# Patient Record
Sex: FEMALE | URBAN - METROPOLITAN AREA
[De-identification: names, ages, dates, MRNs, and addresses within clinical notes are randomized per-mention and may not be internally consistent; named-entity substitution may affect disease eponyms.]

---

## 2019-08-12 ENCOUNTER — WALK IN (OUTPATIENT)
Dept: URGENT CARE | Age: 64
End: 2019-08-12

## 2019-08-12 VITALS
HEART RATE: 84 BPM | WEIGHT: 120 LBS | HEIGHT: 66 IN | SYSTOLIC BLOOD PRESSURE: 118 MMHG | OXYGEN SATURATION: 100 % | BODY MASS INDEX: 19.29 KG/M2 | RESPIRATION RATE: 18 BRPM | DIASTOLIC BLOOD PRESSURE: 68 MMHG | TEMPERATURE: 97.9 F

## 2019-08-12 DIAGNOSIS — J01.40 ACUTE NON-RECURRENT PANSINUSITIS: Primary | ICD-10-CM

## 2019-08-12 PROCEDURE — 99203 OFFICE O/P NEW LOW 30 MIN: CPT | Performed by: NURSE PRACTITIONER

## 2019-08-12 RX ORDER — BENZONATATE 100 MG/1
100 CAPSULE ORAL 3 TIMES DAILY PRN
Qty: 20 CAPSULE | Refills: 0 | Status: SHIPPED | OUTPATIENT
Start: 2019-08-12 | End: 2021-11-19 | Stop reason: CLARIF

## 2019-08-12 RX ORDER — SACCHAROMYCES BOULARDII 250 MG
500 CAPSULE ORAL 2 TIMES DAILY
Qty: 48 CAPSULE | Refills: 1 | Status: SHIPPED | OUTPATIENT
Start: 2019-08-12

## 2019-08-12 RX ORDER — DOXYCYCLINE HYCLATE 100 MG/1
100 CAPSULE ORAL 2 TIMES DAILY
Qty: 20 CAPSULE | Refills: 0 | Status: SHIPPED | OUTPATIENT
Start: 2019-08-12 | End: 2019-08-22

## 2019-08-12 SDOH — HEALTH STABILITY: MENTAL HEALTH: HOW OFTEN DO YOU HAVE 6 OR MORE DRINKS ON ONE OCCASION?: LESS THAN MONTHLY

## 2019-08-12 ASSESSMENT — ENCOUNTER SYMPTOMS
FATIGUE: 1
SHORTNESS OF BREATH: 0
SINUS PRESSURE: 1
CHILLS: 1
NAUSEA: 0
ABDOMINAL PAIN: 0
SINUS PAIN: 1
SORE THROAT: 1
VOMITING: 0
FEVER: 1
COUGH: 1
WHEEZING: 0
HEADACHES: 1

## 2019-10-23 ENCOUNTER — APPOINTMENT (OUTPATIENT)
Dept: CT IMAGING | Facility: HOSPITAL | Age: 64
End: 2019-10-23
Attending: EMERGENCY MEDICINE
Payer: COMMERCIAL

## 2019-10-23 ENCOUNTER — TELEPHONE (OUTPATIENT)
Dept: NEUROLOGY | Facility: CLINIC | Age: 64
End: 2019-10-23

## 2019-10-23 ENCOUNTER — HOSPITAL ENCOUNTER (EMERGENCY)
Facility: HOSPITAL | Age: 64
Discharge: HOME | End: 2019-10-23
Attending: EMERGENCY MEDICINE
Payer: COMMERCIAL

## 2019-10-23 VITALS
OXYGEN SATURATION: 98 % | HEART RATE: 60 BPM | BODY MASS INDEX: 19.29 KG/M2 | HEIGHT: 66 IN | DIASTOLIC BLOOD PRESSURE: 70 MMHG | SYSTOLIC BLOOD PRESSURE: 138 MMHG | WEIGHT: 120 LBS | TEMPERATURE: 98 F | RESPIRATION RATE: 13 BRPM

## 2019-10-23 DIAGNOSIS — G50.0 TRIGEMINAL NEURALGIA: Primary | ICD-10-CM

## 2019-10-23 PROCEDURE — 99284 EMERGENCY DEPT VISIT MOD MDM: CPT

## 2019-10-23 PROCEDURE — 85025 COMPLETE CBC W/AUTO DIFF WBC: CPT | Performed by: EMERGENCY MEDICINE

## 2019-10-23 PROCEDURE — 80053 COMPREHEN METABOLIC PANEL: CPT | Performed by: EMERGENCY MEDICINE

## 2019-10-23 PROCEDURE — 96374 THER/PROPH/DIAG INJ IV PUSH: CPT

## 2019-10-23 PROCEDURE — 70450 CT HEAD/BRAIN W/O DYE: CPT | Performed by: EMERGENCY MEDICINE

## 2019-10-23 RX ORDER — CARBAMAZEPINE 100 MG/1
100 TABLET, CHEWABLE ORAL 2 TIMES DAILY
Qty: 28 TABLET | Refills: 0 | Status: SHIPPED | OUTPATIENT
Start: 2019-10-23 | End: 2019-11-01

## 2019-10-23 RX ORDER — TRAMADOL HYDROCHLORIDE 50 MG/1
50 TABLET ORAL EVERY 8 HOURS PRN
Qty: 10 TABLET | Refills: 0 | Status: SHIPPED | OUTPATIENT
Start: 2019-10-23 | End: 2019-12-02

## 2019-10-23 RX ORDER — SIMVASTATIN 20 MG
20 TABLET ORAL NIGHTLY
COMMUNITY
End: 2021-06-15

## 2019-10-23 RX ORDER — KETOROLAC TROMETHAMINE 30 MG/ML
15 INJECTION, SOLUTION INTRAMUSCULAR; INTRAVENOUS ONCE
Status: COMPLETED | OUTPATIENT
Start: 2019-10-23 | End: 2019-10-23

## 2019-10-23 NOTE — ED PROVIDER NOTES
Patient Seen in: [facility] Emergency Department      History   Patient presents with:  Pain (neurologic)    Stated Complaint: nerve pain on face    HPI    Patient is a 41-year-old female who presents emergency room with a history of pain to the rig clear. Mucous membranes are moist.  Tympanic membranes are negative bilaterally. There is no evidence of any facial rash, or asymmetry appreciated. NECK: There is no focal tenderness to palpation appreciated. There is no JVD.  No meningeal signs or nuchal DRAW GOLD   CBC W/ DIFFERENTIAL          Ct Brain Or Head ([id number])    Result Date: 10/23/2019  CONCLUSION:  No CT evidence for acute intracranial process. Chronic mucosal thickening in the ethmoid air cells and right maxillary sinus.   Dictated by: Dewey Mukherjee,

## 2019-10-23 NOTE — TELEPHONE ENCOUNTER
Adin't scheduled on 11/20 in [facility] office and also put on wait list.. Patient will call when needs refill on medication (only received 2 week supply in ER). Office hours given.

## 2019-10-23 NOTE — ED INITIAL ASSESSMENT (HPI)
Reports of pain to the right side of face, reports hx of chronic right sided pain for years however pain that worsened in the last 2 days.

## 2019-11-01 DIAGNOSIS — G50.0 TRIGEMINAL NEURALGIA: Primary | ICD-10-CM

## 2019-11-01 RX ORDER — CARBAMAZEPINE 100 MG/1
100 TABLET, CHEWABLE ORAL 2 TIMES DAILY
Qty: 60 TABLET | Refills: 0 | Status: SHIPPED | OUTPATIENT
Start: 2019-11-01 | End: 2019-12-02

## 2019-11-01 NOTE — TELEPHONE ENCOUNTER
Medication: Carbamazepine     Date of last refill: 10/23/19 for #28/0 from ER  [place]    Last office visit: N/A  Due back to clinic per last office note:  N/A  Date next office visit scheduled:  11/20/19    Last OV

## 2019-11-08 ENCOUNTER — TELEPHONE (OUTPATIENT)
Dept: NEUROLOGY | Facility: CLINIC | Age: 64
End: 2019-11-08

## 2019-11-08 DIAGNOSIS — G50.0 TRIGEMINAL NEURALGIA: Primary | ICD-10-CM

## 2019-11-08 NOTE — TELEPHONE ENCOUNTER
Relayed to that labs need to be completed for Carbamezapine level monitoring. Pt expressed understanding and states she will have labs completed next week.

## 2019-11-08 NOTE — TELEPHONE ENCOUNTER
Spoke with pt, relayed note below from Dr. Yael Lindsey. She expressed understanding and was encouraged to call office with further questions or concerns.      Pt does state she had CBC and CMP completed while in ED on 10/23/2019, questioning whether she should

## 2019-11-08 NOTE — TELEPHONE ENCOUNTER
Pt calling in to state that she has been having flare ups of trigeminal neuralgia pain. She states that she  experienced an 8 hour episode of pain last night, she states she took 2 50mg tabs Tramadol within 5 hours which did not completely alleviate pain.

## 2019-11-08 NOTE — TELEPHONE ENCOUNTER
MRI ordered. Recommend CBC and CMP in 1 week, and increase CBZ to 200mg BID.  Try for cancellation appointment, priority on waiting list.

## 2019-11-12 ENCOUNTER — TELEPHONE (OUTPATIENT)
Dept: NEUROLOGY | Facility: CLINIC | Age: 64
End: 2019-11-12

## 2019-11-12 DIAGNOSIS — G50.0 TRIGEMINAL NEURALGIA: Primary | ICD-10-CM

## 2019-11-12 NOTE — TELEPHONE ENCOUNTER
Called patient and informed her that below noted new MRI Brain w/wo and MRA have been ordered and authorized by PA team.    Per Piper Munroe, instructed patient to call MRI scheduling to make sure patient is scheduled in a time slot that will accommodate new te

## 2019-11-12 NOTE — TELEPHONE ENCOUNTER
Cristian Fleming from MRI and informed her that new MRI orders have been placed by provider. Per Vanessa Mariscal, patient will need to be informed that new orders will take longer and he should be informed he should call to discuss.     Called PA and informed them

## 2019-11-12 NOTE — TELEPHONE ENCOUNTER
Received call from [facility] in MRI requesting clarification of MRI orders. MRI would like clarification that provider wants an MRI Brain/IAC. Per Jerman, Trigeminal Neuralgia MRI protocol is usually a MRI brain w/wo that and MRA of head not MRI/IAC.

## 2019-11-15 ENCOUNTER — LAB ENCOUNTER (OUTPATIENT)
Dept: LAB | Age: 64
End: 2019-11-15
Attending: Other
Payer: COMMERCIAL

## 2019-11-15 ENCOUNTER — HOSPITAL ENCOUNTER (OUTPATIENT)
Dept: MRI IMAGING | Age: 64
Discharge: HOME | End: 2019-11-15
Attending: Other
Payer: COMMERCIAL

## 2019-11-15 DIAGNOSIS — G50.0 TRIGEMINAL NEURALGIA: ICD-10-CM

## 2019-11-15 PROCEDURE — 80053 COMPREHEN METABOLIC PANEL: CPT

## 2019-11-15 PROCEDURE — A9575 INJ GADOTERATE MEGLUMI 0.1ML: HCPCS | Performed by: OTHER

## 2019-11-15 PROCEDURE — 85025 COMPLETE CBC W/AUTO DIFF WBC: CPT

## 2019-11-15 PROCEDURE — 70546 MR ANGIOGRAPH HEAD W/O&W/DYE: CPT | Performed by: OTHER

## 2019-11-15 PROCEDURE — 70553 MRI BRAIN STEM W/O & W/DYE: CPT | Performed by: OTHER

## 2019-11-15 PROCEDURE — 36415 COLL VENOUS BLD VENIPUNCTURE: CPT

## 2019-11-20 ENCOUNTER — OFFICE VISIT (OUTPATIENT)
Dept: NEUROLOGY | Facility: CLINIC | Age: 64
End: 2019-11-20
Payer: COMMERCIAL

## 2019-11-20 VITALS
HEART RATE: 78 BPM | SYSTOLIC BLOOD PRESSURE: 120 MMHG | WEIGHT: 126 LBS | BODY MASS INDEX: 20 KG/M2 | RESPIRATION RATE: 16 BRPM | DIASTOLIC BLOOD PRESSURE: 62 MMHG

## 2019-11-20 DIAGNOSIS — G50.0 TRIGEMINAL NEURALGIA OF RIGHT SIDE OF FACE: Primary | ICD-10-CM

## 2019-11-20 PROCEDURE — 99243 OFF/OP CNSLTJ NEW/EST LOW 30: CPT | Performed by: OTHER

## 2019-11-20 RX ORDER — MELATONIN
DAILY
COMMUNITY
End: 2020-01-23 | Stop reason: ALTCHOICE

## 2019-11-20 RX ORDER — CARBAMAZEPINE 200 MG/1
TABLET ORAL
Qty: 120 TABLET | Refills: 1 | Status: SHIPPED | OUTPATIENT
Start: 2019-11-20 | End: 2019-12-30

## 2019-11-20 NOTE — PROGRESS NOTES
Pt here for evaluation of trigeminal neuralgia. Pt went to ER for severe constant pain in right side of face where she was diagnosed with trigeminal neuralgia. Pt states medication helped for a few days and then got worse.  Meds were increased by Dr Sofie Ward

## 2019-11-20 NOTE — PROGRESS NOTES
[address]   Neurology- INITIAL CLINIC VISIT  2019, 11:36 AM     Manohar Blackmon Patient Status:  No patient class for patient encounter    1955 MRN [id number]   [facility] g/dL 12.8   Hematocrit      35.0 - 48.0 % 40.7   Platelet Count      243.0 - 450.0 10(3)uL 371.0   MCV      80.0 - 100.0 fL 96.7   MCH      26.0 - 34.0 pg 30.4   MCHC      31.0 - 37.0 g/dL 31.4   RDW      11.0 - 15.0 % 13.4   RDW-SD      35.1 - 46.3 fL 48. mouth daily. , Disp: , Rfl:   •  carBAMazepine 200 MG Oral Tab, Day 1-3: take 1 tab 3 times a day, Day 4-6, if tolerating, increase to 1 tab in am, 1 tab midday, 2 tabs at night, call clinic, Disp: 120 tablet, Rfl: 1  •  carBAMazepine 100 MG Oral Chew Tab, revealed normal light touch perception. Vibratory perception and proprioception were intact at the toes. Pinprick and temperature were normal. Romberg sign was absent. Complex motor skills revealed normal coordination. Finger-nose-finger intact.    Gait wa

## 2019-11-20 NOTE — PATIENT INSTRUCTIONS
After your visit at the [facility] office  today,  please direct any follow up questions or medication needs to the staff in our  [place] office so that your concerns may be promptly addressed.   We are available through [employer] or at the numbers below: must be picked up in office. • Please allow the office 2-3 business days to fill the prescription. • Patient must present photo ID at time of . PLEASE NOTE: PRESCRIPTIONS MUST BE PICKED UP PRIOR TO 3:00PM MONDAY-FRIDAY    Scheduling Tests:     If submitting forms to office staff. • Form completion may require an additional fee. • A signed Release of Information (ATUL) must be on file before forms may be submitted. When dropping off forms, please ask the  for this paper.    • Failure

## 2019-12-02 ENCOUNTER — OFFICE VISIT (OUTPATIENT)
Dept: INTERNAL MEDICINE CLINIC | Facility: CLINIC | Age: 64
End: 2019-12-02
Payer: COMMERCIAL

## 2019-12-02 VITALS
OXYGEN SATURATION: 98 % | TEMPERATURE: 98 F | WEIGHT: 127 LBS | SYSTOLIC BLOOD PRESSURE: 124 MMHG | HEIGHT: 67.32 IN | HEART RATE: 74 BPM | DIASTOLIC BLOOD PRESSURE: 76 MMHG | BODY MASS INDEX: 19.7 KG/M2 | RESPIRATION RATE: 14 BRPM

## 2019-12-02 DIAGNOSIS — Z00.00 PE (PHYSICAL EXAM), ANNUAL: Primary | ICD-10-CM

## 2019-12-02 DIAGNOSIS — Z12.11 SCREEN FOR COLON CANCER: ICD-10-CM

## 2019-12-02 DIAGNOSIS — G50.0 TRIGEMINAL NEURALGIA: ICD-10-CM

## 2019-12-02 PROCEDURE — 90662 IIV NO PRSV INCREASED AG IM: CPT | Performed by: INTERNAL MEDICINE

## 2019-12-02 PROCEDURE — 90472 IMMUNIZATION ADMIN EACH ADD: CPT | Performed by: INTERNAL MEDICINE

## 2019-12-02 PROCEDURE — 90471 IMMUNIZATION ADMIN: CPT | Performed by: INTERNAL MEDICINE

## 2019-12-02 PROCEDURE — 90732 PPSV23 VACC 2 YRS+ SUBQ/IM: CPT | Performed by: INTERNAL MEDICINE

## 2019-12-02 PROCEDURE — 96127 BRIEF EMOTIONAL/BEHAV ASSMT: CPT | Performed by: INTERNAL MEDICINE

## 2019-12-02 PROCEDURE — 99387 INIT PM E/M NEW PAT 65+ YRS: CPT | Performed by: INTERNAL MEDICINE

## 2019-12-02 NOTE — PROGRESS NOTES
Patient presents with:  Establish Care: RG rm 9 new pt physical      HPI:  Here for cpe. Pt has trigeminal neuralgia. Improved with meds. No other complaints. Sees gyne at [place].     Review of Systems   Constitutional: Negative for fever, chills and fa Never      Current Outpatient Medications   Medication Sig Dispense Refill   • JPSJWI-LORTN-UJHOLM-FA-FISHOIL OR Take by mouth daily. • Glucos-Chond-Hyal Ac-Ca Fructo (MOVE FREE [facility] ADVANCE OR) Take by mouth daily.      • Calcium 500-100 MG-UNI hernias. Musculoskeletal: Normal range of motion. No edema and no tenderness. No effusions. Lymphadenopathy: No cervical adenopathy. Neurological: Normal reflexes. No cranial nerve deficit or sensory deficit. Normal muscle tone.  Coordination normal.

## 2019-12-03 ENCOUNTER — TELEPHONE (OUTPATIENT)
Dept: INTERNAL MEDICINE CLINIC | Facility: CLINIC | Age: 64
End: 2019-12-03

## 2019-12-03 DIAGNOSIS — R74.8 ELEVATED LIVER ENZYMES: Primary | ICD-10-CM

## 2019-12-17 ENCOUNTER — MED REC SCAN ONLY (OUTPATIENT)
Dept: INTERNAL MEDICINE CLINIC | Facility: CLINIC | Age: 64
End: 2019-12-17

## 2019-12-23 ENCOUNTER — OFFICE VISIT (OUTPATIENT)
Dept: NEUROLOGY | Facility: CLINIC | Age: 64
End: 2019-12-23
Payer: COMMERCIAL

## 2019-12-23 VITALS
HEART RATE: 64 BPM | RESPIRATION RATE: 16 BRPM | DIASTOLIC BLOOD PRESSURE: 60 MMHG | BODY MASS INDEX: 20 KG/M2 | WEIGHT: 130 LBS | SYSTOLIC BLOOD PRESSURE: 108 MMHG

## 2019-12-23 DIAGNOSIS — G50.0 TRIGEMINAL NEURALGIA OF RIGHT SIDE OF FACE: Primary | ICD-10-CM

## 2019-12-23 DIAGNOSIS — G44.209 TENSION HEADACHE: ICD-10-CM

## 2019-12-23 PROCEDURE — 99214 OFFICE O/P EST MOD 30 MIN: CPT | Performed by: OTHER

## 2019-12-23 NOTE — PROGRESS NOTES
Ryan [address]27   Neurology-f/u    Melissa Palacio Patient Status:  No patient class for patient encounter    1955 MRN [id number]   Location [facility] PCP Maciej Thornton MD              HPI: displacement or deformity. Clinical correlation recommended. 3. Minimal chronic microvascular ischemic changes in the cerebral white matter.     Component      Latest Ref Rng & Units 11/15/2019   WBC      4.0 - 11.0 x10(3) uL 8.2   RBC      3.80 - 5.30 use drugs. Allergies:    Penicillins             RASH    Comment:Occurred when she was a child, thinks its a rash    MEDICATIONS:    Current Outpatient Medications:   •  DXJLFC-TWSYT-NKOURK-FA-FISHOIL OR, Take by mouth daily. , Disp: , Rfl:   •  Glucos-C and legs. Deep tendon reflexes were 2 at the biceps, brachioradialis, triceps, knee jerk, and ankle jerk. Plantar responses were flexor bilaterally. Sensory exam revealed normal light touch perception.  Vibratory perception and proprioception were intact

## 2019-12-30 ENCOUNTER — LAB ENCOUNTER (OUTPATIENT)
Dept: LAB | Age: 64
End: 2019-12-30
Attending: Other
Payer: COMMERCIAL

## 2019-12-30 ENCOUNTER — HOSPITAL ENCOUNTER (OUTPATIENT)
Dept: BONE DENSITY | Age: 64
Discharge: HOME | End: 2019-12-30
Attending: Other
Payer: COMMERCIAL

## 2019-12-30 DIAGNOSIS — G50.0 TRIGEMINAL NEURALGIA OF RIGHT SIDE OF FACE: Primary | ICD-10-CM

## 2019-12-30 DIAGNOSIS — G50.0 TRIGEMINAL NEURALGIA OF RIGHT SIDE OF FACE: ICD-10-CM

## 2019-12-30 DIAGNOSIS — N95.1 POST MENOPAUSAL SYNDROME: ICD-10-CM

## 2019-12-30 LAB
ALBUMIN SERPL-MCNC: 3.1 G/DL (ref 3.4–5)
ALBUMIN/GLOB SERPL: 0.7 {RATIO} (ref 1–2)
ALP LIVER SERPL-CCNC: 85 U/L (ref 50–130)
ALT SERPL-CCNC: 104 U/L (ref 13–56)
ANION GAP SERPL CALC-SCNC: 2 MMOL/L (ref 0–18)
AST SERPL-CCNC: 51 U/L (ref 15–37)
BASOPHILS # BLD AUTO: 0.05 X10(3) UL (ref 0–0.2)
BASOPHILS NFR BLD AUTO: 0.9 %
BILIRUB SERPL-MCNC: 0.2 MG/DL (ref 0.1–2)
BUN BLD-MCNC: 20 MG/DL (ref 7–18)
BUN/CREAT SERPL: 25.6 (ref 10–20)
CALCIUM BLD-MCNC: 8.4 MG/DL (ref 8.5–10.1)
CHLORIDE SERPL-SCNC: 105 MMOL/L (ref 98–112)
CO2 SERPL-SCNC: 31 MMOL/L (ref 21–32)
CREAT BLD-MCNC: 0.78 MG/DL (ref 0.55–1.02)
DEPRECATED RDW RBC AUTO: 47.5 FL (ref 35.1–46.3)
EOSINOPHIL # BLD AUTO: 0.08 X10(3) UL (ref 0–0.7)
EOSINOPHIL NFR BLD AUTO: 1.4 %
ERYTHROCYTE [DISTWIDTH] IN BLOOD BY AUTOMATED COUNT: 12.9 % (ref 11–15)
GLOBULIN PLAS-MCNC: 4.5 G/DL (ref 2.8–4.4)
GLUCOSE BLD-MCNC: 120 MG/DL (ref 70–99)
HCT VFR BLD AUTO: 38.9 % (ref 35–48)
HGB BLD-MCNC: 12.2 G/DL (ref 12–16)
IMM GRANULOCYTES # BLD AUTO: 0.01 X10(3) UL (ref 0–1)
IMM GRANULOCYTES NFR BLD: 0.2 %
LYMPHOCYTES # BLD AUTO: 1.67 X10(3) UL (ref 1–4)
LYMPHOCYTES NFR BLD AUTO: 30.2 %
M PROTEIN MFR SERPL ELPH: 7.6 G/DL (ref 6.4–8.2)
MCH RBC QN AUTO: 31 PG (ref 26–34)
MCHC RBC AUTO-ENTMCNC: 31.4 G/DL (ref 31–37)
MCV RBC AUTO: 99 FL (ref 80–100)
MONOCYTES # BLD AUTO: 0.58 X10(3) UL (ref 0.1–1)
MONOCYTES NFR BLD AUTO: 10.5 %
NEUTROPHILS # BLD AUTO: 3.14 X10 (3) UL (ref 1.5–7.7)
NEUTROPHILS # BLD AUTO: 3.14 X10(3) UL (ref 1.5–7.7)
NEUTROPHILS NFR BLD AUTO: 56.8 %
OSMOLALITY SERPL CALC.SUM OF ELEC: 290 MOSM/KG (ref 275–295)
PATIENT FASTING Y/N/NP: NO
PLATELET # BLD AUTO: 317 10(3)UL (ref 150–450)
POTASSIUM SERPL-SCNC: 4.1 MMOL/L (ref 3.5–5.1)
RBC # BLD AUTO: 3.93 X10(6)UL (ref 3.8–5.3)
SODIUM SERPL-SCNC: 138 MMOL/L (ref 136–145)
VIT D+METAB SERPL-MCNC: 48.7 NG/ML (ref 30–100)
WBC # BLD AUTO: 5.5 X10(3) UL (ref 4–11)

## 2019-12-30 PROCEDURE — 85025 COMPLETE CBC W/AUTO DIFF WBC: CPT

## 2019-12-30 PROCEDURE — 77080 DXA BONE DENSITY AXIAL: CPT | Performed by: OTHER

## 2019-12-30 PROCEDURE — 36415 COLL VENOUS BLD VENIPUNCTURE: CPT

## 2019-12-30 PROCEDURE — 82306 VITAMIN D 25 HYDROXY: CPT

## 2019-12-30 PROCEDURE — 80053 COMPREHEN METABOLIC PANEL: CPT

## 2019-12-30 RX ORDER — CARBAMAZEPINE 200 MG/1
TABLET ORAL
Qty: 120 TABLET | Refills: 1 | Status: SHIPPED | OUTPATIENT
Start: 2019-12-30 | End: 2020-01-21

## 2019-12-30 NOTE — TELEPHONE ENCOUNTER
Pt requesting new rx be sent to [facility]. New rx pended. Luis Alberto informed to cancel current rx.     Medication: Carbamezepine 200 mg     Date of last refill: 11/20/19 (#120/1)  [place]    Last office visit: 12/23

## 2019-12-31 ENCOUNTER — PATIENT MESSAGE (OUTPATIENT)
Dept: NEUROLOGY | Facility: CLINIC | Age: 64
End: 2019-12-31

## 2019-12-31 DIAGNOSIS — G50.0 TRIGEMINAL NEURALGIA: Primary | ICD-10-CM

## 2019-12-31 NOTE — TELEPHONE ENCOUNTER
From: Lizzette Kiser  To: Grabiel Urbano DO  Sent: 12/31/2019 12:07 PM CST  Subject: Test Results Question    I just received a call from my primary care physician, Dr. Leela Bernal, about the results of the blood work you ordered yesterday.  My live

## 2020-01-07 ENCOUNTER — APPOINTMENT (OUTPATIENT)
Dept: LAB | Age: 65
End: 2020-01-07
Attending: Other
Payer: COMMERCIAL

## 2020-01-07 DIAGNOSIS — G50.0 TRIGEMINAL NEURALGIA: ICD-10-CM

## 2020-01-07 DIAGNOSIS — G50.0 TRIGEMINAL NEURALGIA: Primary | ICD-10-CM

## 2020-01-07 LAB
ALBUMIN SERPL-MCNC: 3.4 G/DL (ref 3.4–5)
ALBUMIN/GLOB SERPL: 0.8 {RATIO} (ref 1–2)
ALP LIVER SERPL-CCNC: 77 U/L (ref 50–130)
ALT SERPL-CCNC: 132 U/L (ref 13–56)
ANION GAP SERPL CALC-SCNC: 3 MMOL/L (ref 0–18)
AST SERPL-CCNC: 74 U/L (ref 15–37)
BILIRUB SERPL-MCNC: 0.4 MG/DL (ref 0.1–2)
BUN BLD-MCNC: 13 MG/DL (ref 7–18)
BUN/CREAT SERPL: 16 (ref 10–20)
CALCIUM BLD-MCNC: 9.1 MG/DL (ref 8.5–10.1)
CHLORIDE SERPL-SCNC: 102 MMOL/L (ref 98–112)
CO2 SERPL-SCNC: 31 MMOL/L (ref 21–32)
CREAT BLD-MCNC: 0.81 MG/DL (ref 0.55–1.02)
GLOBULIN PLAS-MCNC: 4.2 G/DL (ref 2.8–4.4)
GLUCOSE BLD-MCNC: 89 MG/DL (ref 70–99)
M PROTEIN MFR SERPL ELPH: 7.6 G/DL (ref 6.4–8.2)
OSMOLALITY SERPL CALC.SUM OF ELEC: 282 MOSM/KG (ref 275–295)
PATIENT FASTING Y/N/NP: NO
POTASSIUM SERPL-SCNC: 4.5 MMOL/L (ref 3.5–5.1)
SODIUM SERPL-SCNC: 136 MMOL/L (ref 136–145)

## 2020-01-07 PROCEDURE — 36415 COLL VENOUS BLD VENIPUNCTURE: CPT

## 2020-01-07 PROCEDURE — 80053 COMPREHEN METABOLIC PANEL: CPT

## 2020-01-07 RX ORDER — GABAPENTIN 100 MG/1
CAPSULE ORAL
Qty: 270 CAPSULE | Refills: 0 | Status: SHIPPED | OUTPATIENT
Start: 2020-01-07 | End: 2020-01-21

## 2020-01-21 ENCOUNTER — OFFICE VISIT (OUTPATIENT)
Dept: INTERNAL MEDICINE CLINIC | Facility: CLINIC | Age: 65
End: 2020-01-21
Payer: COMMERCIAL

## 2020-01-21 ENCOUNTER — LAB ENCOUNTER (OUTPATIENT)
Dept: LAB | Age: 65
End: 2020-01-21
Attending: Other
Payer: COMMERCIAL

## 2020-01-21 VITALS
RESPIRATION RATE: 16 BRPM | WEIGHT: 130 LBS | BODY MASS INDEX: 20.4 KG/M2 | DIASTOLIC BLOOD PRESSURE: 64 MMHG | HEART RATE: 78 BPM | TEMPERATURE: 98 F | SYSTOLIC BLOOD PRESSURE: 122 MMHG | OXYGEN SATURATION: 95 % | HEIGHT: 67 IN

## 2020-01-21 DIAGNOSIS — G50.0 TRIGEMINAL NEURALGIA: ICD-10-CM

## 2020-01-21 DIAGNOSIS — M85.89 OSTEOPENIA OF MULTIPLE SITES: Primary | ICD-10-CM

## 2020-01-21 DIAGNOSIS — G50.0 TRIGEMINAL NEURALGIA OF RIGHT SIDE OF FACE: ICD-10-CM

## 2020-01-21 DIAGNOSIS — R79.89 ELEVATED LFTS: ICD-10-CM

## 2020-01-21 LAB
ALBUMIN SERPL-MCNC: 3.4 G/DL (ref 3.4–5)
ALBUMIN/GLOB SERPL: 0.8 {RATIO} (ref 1–2)
ALP LIVER SERPL-CCNC: 82 U/L (ref 50–130)
ALT SERPL-CCNC: 211 U/L (ref 13–56)
ANION GAP SERPL CALC-SCNC: 3 MMOL/L (ref 0–18)
AST SERPL-CCNC: 108 U/L (ref 15–37)
BASOPHILS # BLD AUTO: 0.08 X10(3) UL (ref 0–0.2)
BASOPHILS NFR BLD AUTO: 1.7 %
BILIRUB SERPL-MCNC: 0.3 MG/DL (ref 0.1–2)
BUN BLD-MCNC: 14 MG/DL (ref 7–18)
BUN/CREAT SERPL: 16.3 (ref 10–20)
CALCIUM BLD-MCNC: 9.5 MG/DL (ref 8.5–10.1)
CHLORIDE SERPL-SCNC: 105 MMOL/L (ref 98–112)
CO2 SERPL-SCNC: 31 MMOL/L (ref 21–32)
CREAT BLD-MCNC: 0.86 MG/DL (ref 0.55–1.02)
DEPRECATED RDW RBC AUTO: 48.1 FL (ref 35.1–46.3)
EOSINOPHIL # BLD AUTO: 0.17 X10(3) UL (ref 0–0.7)
EOSINOPHIL NFR BLD AUTO: 3.6 %
ERYTHROCYTE [DISTWIDTH] IN BLOOD BY AUTOMATED COUNT: 13.1 % (ref 11–15)
GLOBULIN PLAS-MCNC: 4.4 G/DL (ref 2.8–4.4)
GLUCOSE BLD-MCNC: 85 MG/DL (ref 70–99)
HCT VFR BLD AUTO: 40.1 % (ref 35–48)
HGB BLD-MCNC: 12.7 G/DL (ref 12–16)
IMM GRANULOCYTES # BLD AUTO: 0.01 X10(3) UL (ref 0–1)
IMM GRANULOCYTES NFR BLD: 0.2 %
LYMPHOCYTES # BLD AUTO: 1.72 X10(3) UL (ref 1–4)
LYMPHOCYTES NFR BLD AUTO: 36.6 %
M PROTEIN MFR SERPL ELPH: 7.8 G/DL (ref 6.4–8.2)
MCH RBC QN AUTO: 31.6 PG (ref 26–34)
MCHC RBC AUTO-ENTMCNC: 31.7 G/DL (ref 31–37)
MCV RBC AUTO: 99.8 FL (ref 80–100)
MONOCYTES # BLD AUTO: 0.58 X10(3) UL (ref 0.1–1)
MONOCYTES NFR BLD AUTO: 12.3 %
NEUTROPHILS # BLD AUTO: 2.14 X10 (3) UL (ref 1.5–7.7)
NEUTROPHILS # BLD AUTO: 2.14 X10(3) UL (ref 1.5–7.7)
NEUTROPHILS NFR BLD AUTO: 45.6 %
OSMOLALITY SERPL CALC.SUM OF ELEC: 288 MOSM/KG (ref 275–295)
PATIENT FASTING Y/N/NP: NO
PLATELET # BLD AUTO: 330 10(3)UL (ref 150–450)
POTASSIUM SERPL-SCNC: 4.5 MMOL/L (ref 3.5–5.1)
RBC # BLD AUTO: 4.02 X10(6)UL (ref 3.8–5.3)
SODIUM SERPL-SCNC: 139 MMOL/L (ref 136–145)
WBC # BLD AUTO: 4.7 X10(3) UL (ref 4–11)

## 2020-01-21 PROCEDURE — 85025 COMPLETE CBC W/AUTO DIFF WBC: CPT

## 2020-01-21 PROCEDURE — 80053 COMPREHEN METABOLIC PANEL: CPT

## 2020-01-21 PROCEDURE — 99213 OFFICE O/P EST LOW 20 MIN: CPT | Performed by: INTERNAL MEDICINE

## 2020-01-21 PROCEDURE — 36415 COLL VENOUS BLD VENIPUNCTURE: CPT

## 2020-01-21 RX ORDER — GABAPENTIN 300 MG/1
300 CAPSULE ORAL 3 TIMES DAILY
Refills: 0 | COMMUNITY
Start: 2020-01-21 | End: 2020-02-11

## 2020-01-21 NOTE — PROGRESS NOTES
Patient presents with:  Lab Results: RG rm 9 Elevated liver enzymes and bone density      HPI:  Here for f/u of elevated lft's on meds for neuralgia, stoped by dr Oriana Luis as possible cause just today.  Pt had dexa shows osteopenia, not a fall risk, taking just stopped it today. Rpt lft's in 2 weeks, pursue secondary w/u if still elevated  Increase vit d to additional 1000iu daily  No orders of the defined types were placed in this encounter.       Meds & Refills for this Visit:  Requested Prescriptions

## 2020-01-23 ENCOUNTER — OFFICE VISIT (OUTPATIENT)
Dept: NEUROLOGY | Facility: CLINIC | Age: 65
End: 2020-01-23
Payer: COMMERCIAL

## 2020-01-23 VITALS
DIASTOLIC BLOOD PRESSURE: 70 MMHG | BODY MASS INDEX: 21 KG/M2 | RESPIRATION RATE: 16 BRPM | HEART RATE: 68 BPM | SYSTOLIC BLOOD PRESSURE: 122 MMHG | WEIGHT: 131 LBS

## 2020-01-23 DIAGNOSIS — G50.0 TRIGEMINAL NEURALGIA: Primary | ICD-10-CM

## 2020-01-23 DIAGNOSIS — R74.8 ELEVATED LIVER ENZYMES: ICD-10-CM

## 2020-01-23 PROCEDURE — 99213 OFFICE O/P EST LOW 20 MIN: CPT | Performed by: OTHER

## 2020-01-23 RX ORDER — GABAPENTIN 600 MG/1
600 TABLET ORAL 3 TIMES DAILY
Qty: 90 TABLET | Refills: 1 | Status: SHIPPED | OUTPATIENT
Start: 2020-01-23 | End: 2020-02-11

## 2020-01-23 RX ORDER — BIOTIN 1 MG
1 TABLET ORAL DAILY
COMMUNITY

## 2020-01-23 NOTE — PROGRESS NOTES
Ryan [address]27   Neurology-f/u    Stas Cm Patient Status:  No patient class for patient encounter    1955 MRN [id number]   Location [facility] PCP Yaneth Williamson MD              HPI: cisternal segment of the left trigeminal nerve resulting in minimal local deformity and inferior displacement.   A small vein is seen along the   superior aspect of the anterior portion of the cisternal segment of the right trigeminal nerve without signific grandmother, paternal grandfather, paternal grandmother, son, and son. Social History:   reports that she has quit smoking. She smoked 0.00 packs per day. She has never used smokeless tobacco. She reports current alcohol use.  She reports that she does n sharp. Pupils were round and reacted to light. Extraocular movements were full. There was no face, jaw, palate or tongue weakness or atrophy. Hearing was grossly intact. Shoulder shrug was normal.   Motor exam revealed normal muscle bulk and tone.  No atrop

## 2020-01-28 ENCOUNTER — TELEPHONE (OUTPATIENT)
Dept: NEUROLOGY | Facility: CLINIC | Age: 65
End: 2020-01-28

## 2020-01-28 DIAGNOSIS — G50.0 TRIGEMINAL NEURALGIA: Primary | ICD-10-CM

## 2020-01-28 RX ORDER — TRAMADOL HYDROCHLORIDE 50 MG/1
50 TABLET ORAL EVERY 8 HOURS PRN
Qty: 10 TABLET | Refills: 0 | Status: SHIPPED | OUTPATIENT
Start: 2020-01-28 | End: 2020-03-12 | Stop reason: ALTCHOICE

## 2020-01-28 RX ORDER — METHYLPREDNISOLONE 4 MG/1
TABLET ORAL
Qty: 1 PACKAGE | Refills: 0 | Status: SHIPPED | OUTPATIENT
Start: 2020-01-28 | End: 2020-03-10 | Stop reason: ALTCHOICE

## 2020-01-28 NOTE — TELEPHONE ENCOUNTER
S: Since stopping Carbamazepine, right sided TGN pain has been getting worse.     B: LOV 1/23/2020 -     Controlled with CBZ, but developed elevated liver enzymes and dexa scan showed osteopenia, need to stop  Off since yesterday  Repeat LFT's in 2 weeks  I

## 2020-01-28 NOTE — TELEPHONE ENCOUNTER
Increase gabapentin to 900mg TID, and start medrol dose pack. Will also send in 10 tabs of tramadol, she should just watch for sedation with both tramadol and gabapentin, don't take at same time.  For the gabapentin, she should take the second dose later in

## 2020-01-28 NOTE — TELEPHONE ENCOUNTER
Called patient and informed her of the following notes from provider;    Josie Orantes, DO 1/28/20 2:10 PM   Note      Increase gabapentin to 900mg TID, and start medrol dose pack.  Will also send in 10 tabs of tramadol, she should just watch for audie

## 2020-02-04 ENCOUNTER — APPOINTMENT (OUTPATIENT)
Dept: LAB | Age: 65
End: 2020-02-04
Attending: INTERNAL MEDICINE
Payer: COMMERCIAL

## 2020-02-04 DIAGNOSIS — R79.89 ELEVATED LFTS: Primary | ICD-10-CM

## 2020-02-04 DIAGNOSIS — R79.89 ELEVATED LFTS: ICD-10-CM

## 2020-02-04 LAB
ALBUMIN SERPL-MCNC: 3.5 G/DL (ref 3.4–5)
ALBUMIN/GLOB SERPL: 0.7 {RATIO} (ref 1–2)
ALP LIVER SERPL-CCNC: 74 U/L (ref 50–130)
ALT SERPL-CCNC: 129 U/L (ref 13–56)
ANION GAP SERPL CALC-SCNC: 3 MMOL/L (ref 0–18)
AST SERPL-CCNC: 57 U/L (ref 15–37)
BILIRUB SERPL-MCNC: 0.5 MG/DL (ref 0.1–2)
BUN BLD-MCNC: 16 MG/DL (ref 7–18)
BUN/CREAT SERPL: 17.4 (ref 10–20)
CALCIUM BLD-MCNC: 9.3 MG/DL (ref 8.5–10.1)
CHLORIDE SERPL-SCNC: 105 MMOL/L (ref 98–112)
CO2 SERPL-SCNC: 32 MMOL/L (ref 21–32)
CREAT BLD-MCNC: 0.92 MG/DL (ref 0.55–1.02)
GLOBULIN PLAS-MCNC: 4.9 G/DL (ref 2.8–4.4)
GLUCOSE BLD-MCNC: 89 MG/DL (ref 70–99)
M PROTEIN MFR SERPL ELPH: 8.4 G/DL (ref 6.4–8.2)
OSMOLALITY SERPL CALC.SUM OF ELEC: 291 MOSM/KG (ref 275–295)
PATIENT FASTING Y/N/NP: YES
POTASSIUM SERPL-SCNC: 4.9 MMOL/L (ref 3.5–5.1)
SODIUM SERPL-SCNC: 140 MMOL/L (ref 136–145)

## 2020-02-04 PROCEDURE — 80053 COMPREHEN METABOLIC PANEL: CPT

## 2020-02-04 PROCEDURE — 36415 COLL VENOUS BLD VENIPUNCTURE: CPT

## 2020-02-04 RX ORDER — GABAPENTIN 300 MG/1
300 CAPSULE ORAL 3 TIMES DAILY
Refills: 0 | Status: CANCELLED
Start: 2020-02-04

## 2020-02-11 ENCOUNTER — LAB ENCOUNTER (OUTPATIENT)
Dept: LAB | Age: 65
End: 2020-02-11
Attending: INTERNAL MEDICINE
Payer: COMMERCIAL

## 2020-02-11 DIAGNOSIS — R79.89 ELEVATED LFTS: ICD-10-CM

## 2020-02-11 DIAGNOSIS — G50.0 TRIGEMINAL NEURALGIA: Primary | ICD-10-CM

## 2020-02-11 DIAGNOSIS — R74.8 ELEVATED LIVER ENZYMES: ICD-10-CM

## 2020-02-11 LAB
ALBUMIN SERPL-MCNC: 3.2 G/DL (ref 3.4–5)
ALBUMIN/GLOB SERPL: 0.7 {RATIO} (ref 1–2)
ALP LIVER SERPL-CCNC: 72 U/L (ref 50–130)
ALT SERPL-CCNC: 187 U/L (ref 13–56)
ANION GAP SERPL CALC-SCNC: 1 MMOL/L (ref 0–18)
AST SERPL-CCNC: 99 U/L (ref 15–37)
BILIRUB SERPL-MCNC: 0.2 MG/DL (ref 0.1–2)
BUN BLD-MCNC: 25 MG/DL (ref 7–18)
BUN/CREAT SERPL: 26.6 (ref 10–20)
CALCIUM BLD-MCNC: 9.2 MG/DL (ref 8.5–10.1)
CHLORIDE SERPL-SCNC: 111 MMOL/L (ref 98–112)
CO2 SERPL-SCNC: 29 MMOL/L (ref 21–32)
CREAT BLD-MCNC: 0.94 MG/DL (ref 0.55–1.02)
DEPRECATED HBV CORE AB SER IA-ACNC: 197.9 NG/ML (ref 18–340)
GLOBULIN PLAS-MCNC: 4.7 G/DL (ref 2.8–4.4)
GLUCOSE BLD-MCNC: 81 MG/DL (ref 70–99)
HAV IGM SER QL: NONREACTIVE
HBV CORE IGM SER QL: NONREACTIVE
HBV SURFACE AG SERPL QL IA: NONREACTIVE
HCV AB SERPL QL IA: NONREACTIVE
IRON SATURATION: 23 % (ref 15–50)
IRON SERPL-MCNC: 87 UG/DL (ref 50–170)
M PROTEIN MFR SERPL ELPH: 7.9 G/DL (ref 6.4–8.2)
OSMOLALITY SERPL CALC.SUM OF ELEC: 295 MOSM/KG (ref 275–295)
PATIENT FASTING Y/N/NP: YES
POTASSIUM SERPL-SCNC: 4.8 MMOL/L (ref 3.5–5.1)
SODIUM SERPL-SCNC: 141 MMOL/L (ref 136–145)
TOTAL IRON BINDING CAPACITY: 377 UG/DL (ref 240–450)
TRANSFERRIN SERPL-MCNC: 253 MG/DL (ref 200–360)

## 2020-02-11 PROCEDURE — 80053 COMPREHEN METABOLIC PANEL: CPT

## 2020-02-11 PROCEDURE — 83540 ASSAY OF IRON: CPT

## 2020-02-11 PROCEDURE — 80074 ACUTE HEPATITIS PANEL: CPT

## 2020-02-11 PROCEDURE — 36415 COLL VENOUS BLD VENIPUNCTURE: CPT

## 2020-02-11 PROCEDURE — 82728 ASSAY OF FERRITIN: CPT

## 2020-02-11 PROCEDURE — 83550 IRON BINDING TEST: CPT

## 2020-02-11 NOTE — TELEPHONE ENCOUNTER
Spoke with pt and she is taking 900 mg in am, 900 mg at noon and 1200 mg at night but next week is going to be takin 900,1200,1200 at Dr [employer] advisement. New RX's pended for provider review.

## 2020-02-13 RX ORDER — GABAPENTIN 300 MG/1
CAPSULE ORAL
Qty: 30 CAPSULE | Refills: 1 | Status: SHIPPED | OUTPATIENT
Start: 2020-02-13 | End: 2020-05-27

## 2020-02-13 RX ORDER — GABAPENTIN 600 MG/1
TABLET ORAL
Qty: 150 TABLET | Refills: 1 | Status: SHIPPED | OUTPATIENT
Start: 2020-02-13 | End: 2020-03-10

## 2020-02-18 ENCOUNTER — HOSPITAL ENCOUNTER (OUTPATIENT)
Dept: ULTRASOUND IMAGING | Age: 65
Discharge: HOME | End: 2020-02-18
Attending: INTERNAL MEDICINE
Payer: COMMERCIAL

## 2020-02-18 DIAGNOSIS — R79.89 ELEVATED LFTS: ICD-10-CM

## 2020-02-18 PROCEDURE — 76700 US EXAM ABDOM COMPLETE: CPT | Performed by: INTERNAL MEDICINE

## 2020-03-05 ENCOUNTER — LAB ENCOUNTER (OUTPATIENT)
Dept: LAB | Age: 65
End: 2020-03-05
Attending: INTERNAL MEDICINE
Payer: COMMERCIAL

## 2020-03-05 ENCOUNTER — OFFICE VISIT (OUTPATIENT)
Dept: SURGERY | Facility: CLINIC | Age: 65
End: 2020-03-05
Payer: COMMERCIAL

## 2020-03-05 VITALS
RESPIRATION RATE: 16 BRPM | BODY MASS INDEX: 21 KG/M2 | WEIGHT: 134 LBS | OXYGEN SATURATION: 94 % | SYSTOLIC BLOOD PRESSURE: 113 MMHG | HEART RATE: 75 BPM | DIASTOLIC BLOOD PRESSURE: 74 MMHG

## 2020-03-05 DIAGNOSIS — D89.2 PARAPROTEINEMIA: ICD-10-CM

## 2020-03-05 DIAGNOSIS — R79.89 ELEVATED LIVER FUNCTION TESTS: ICD-10-CM

## 2020-03-05 DIAGNOSIS — R79.89 ELEVATED LIVER FUNCTION TESTS: Primary | ICD-10-CM

## 2020-03-05 LAB
ALBUMIN SERPL-MCNC: 3.6 G/DL (ref 3.4–5)
ALP LIVER SERPL-CCNC: 73 U/L (ref 50–130)
ALT SERPL-CCNC: 150 U/L (ref 13–56)
AST SERPL-CCNC: 79 U/L (ref 15–37)
BILIRUB DIRECT SERPL-MCNC: 0.1 MG/DL (ref 0–0.2)
BILIRUB SERPL-MCNC: 0.5 MG/DL (ref 0.1–2)
IMMUNOGLOBULIN PNL SER-MCNC: 2300 MG/DL (ref 791–1643)
M PROTEIN MFR SERPL ELPH: 8.5 G/DL (ref 6.4–8.2)

## 2020-03-05 PROCEDURE — 80076 HEPATIC FUNCTION PANEL: CPT

## 2020-03-05 PROCEDURE — 36415 COLL VENOUS BLD VENIPUNCTURE: CPT

## 2020-03-05 PROCEDURE — 83516 IMMUNOASSAY NONANTIBODY: CPT

## 2020-03-05 PROCEDURE — 84165 PROTEIN E-PHORESIS SERUM: CPT

## 2020-03-05 PROCEDURE — 82784 ASSAY IGA/IGD/IGG/IGM EACH: CPT

## 2020-03-05 PROCEDURE — 86038 ANTINUCLEAR ANTIBODIES: CPT

## 2020-03-05 PROCEDURE — 83883 ASSAY NEPHELOMETRY NOT SPEC: CPT

## 2020-03-05 PROCEDURE — 86334 IMMUNOFIX E-PHORESIS SERUM: CPT

## 2020-03-05 NOTE — PROGRESS NOTES
[facility]  [address], [address]  [address]  [phone] ([phone]) noon for a total of 1200 mg, and 2 tabs at night for a total of 1200 mg, Disp: 150 tablet, Rfl: 1  •  gabapentin 300 MG Oral Cap, Take one tab in AM with 600 mg for a total of 900 mg, Disp: 30 capsule, Rfl: 1  •  methylPREDNISolone (MEDROL) 4 MG Oral Table Charlotte Palencia [address] [place] [address], [address] [facility], [place], [place],  Jenni [facility] (office)  [phone] (fax)  [phone] (mobile)  [email or web address]

## 2020-03-06 LAB — ANA SCREEN: NEGATIVE

## 2020-03-07 LAB
F-ACTIN (SMOOTH MUSCLE) AB: 6 UNITS
MITOCHONDRIAL M2 AB, IGG: 2.9 UNITS

## 2020-03-08 DIAGNOSIS — D89.2 PARAPROTEINEMIA: Primary | ICD-10-CM

## 2020-03-08 DIAGNOSIS — R79.89 ELEVATED LIVER FUNCTION TESTS: ICD-10-CM

## 2020-03-09 LAB
KAPPA FREE LIGHT CHAIN: 7.73 MG/DL (ref 0.33–1.94)
KAPPA/LAMBDA FLC RATIO: 4.91 (ref 0.26–1.65)
LAMBDA FREE LIGHT CHAIN: 1.57 MG/DL (ref 0.57–2.63)

## 2020-03-10 ENCOUNTER — OFFICE VISIT (OUTPATIENT)
Dept: NEUROLOGY | Facility: CLINIC | Age: 65
End: 2020-03-10
Payer: COMMERCIAL

## 2020-03-10 VITALS
SYSTOLIC BLOOD PRESSURE: 114 MMHG | HEART RATE: 68 BPM | DIASTOLIC BLOOD PRESSURE: 60 MMHG | BODY MASS INDEX: 21 KG/M2 | WEIGHT: 133 LBS | RESPIRATION RATE: 14 BRPM

## 2020-03-10 DIAGNOSIS — R74.8 ELEVATED LIVER ENZYMES: ICD-10-CM

## 2020-03-10 DIAGNOSIS — G50.0 TRIGEMINAL NEURALGIA: Primary | ICD-10-CM

## 2020-03-10 LAB
ALBUMIN SERPL ELPH-MCNC: 4.32 G/DL (ref 3.75–5.21)
ALBUMIN/GLOB SERPL: 1.08 {RATIO} (ref 1–2)
ALPHA1 GLOB SERPL ELPH-MCNC: 0.31 G/DL (ref 0.19–0.46)
ALPHA2 GLOB SERPL ELPH-MCNC: 0.66 G/DL (ref 0.48–1.05)
B-GLOBULIN SERPL ELPH-MCNC: 0.89 G/DL (ref 0.68–1.23)
GAMMA GLOB SERPL ELPH-MCNC: 2.12 G/DL (ref 0.62–1.7)
M PROTEIN MFR SERPL ELPH: 8.3 G/DL (ref 6.4–8.2)
M-SPIKE 1: 1.54 G/DL (ref ?–0)

## 2020-03-10 PROCEDURE — 99213 OFFICE O/P EST LOW 20 MIN: CPT | Performed by: OTHER

## 2020-03-10 RX ORDER — GABAPENTIN 600 MG/1
TABLET ORAL
Qty: 450 TABLET | Refills: 1 | Status: SHIPPED | OUTPATIENT
Start: 2020-03-10 | End: 2020-04-07

## 2020-03-10 NOTE — PROGRESS NOTES
Ryan [address]27   Neurology-f/u    Elo Files Patient Status:  No patient class for patient encounter    1955 MRN [id number]   Location Mon[facility] Alert PCP Marcie Moran MD              HPI: acute intracranial abnormality identified. No evidence of vestibular schwannoma.      2. 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 Musculoskeletal: There is no scoliosis, or joint deformities  Neurologic examination:  Mental status: Patient is alert, attentive, and oriented x 3.  Language is coherent and flu Neurology  [facility]

## 2020-03-12 ENCOUNTER — OFFICE VISIT (OUTPATIENT)
Dept: SURGERY | Facility: CLINIC | Age: 65
End: 2020-03-12
Payer: COMMERCIAL

## 2020-03-12 VITALS — HEART RATE: 74 BPM | TEMPERATURE: 98 F | DIASTOLIC BLOOD PRESSURE: 71 MMHG | SYSTOLIC BLOOD PRESSURE: 104 MMHG

## 2020-03-12 DIAGNOSIS — R79.89 ELEVATED LIVER FUNCTION TESTS: Primary | ICD-10-CM

## 2020-03-12 DIAGNOSIS — K80.20 CALCULUS OF GALLBLADDER WITHOUT CHOLECYSTITIS WITHOUT OBSTRUCTION: ICD-10-CM

## 2020-03-12 PROCEDURE — 99243 OFF/OP CNSLTJ NEW/EST LOW 30: CPT | Performed by: SURGERY

## 2020-03-12 NOTE — H&P
New Patient Visit Note       Active Problems      1. Elevated liver function tests    2.  Calculus of gallbladder without cholecystitis without obstruction        Chief Complaint   Patient presents with:  Gallbladder: NW PT ref by PCP for gallbladder -- US that I concur with this decision. Allergies  Isabel Horvath is allergic to penicillins. Past Medical / Surgical / Social / Family History    The past medical and past surgical history have been reviewed by me today.     Past Medical History:   Diagnosis Date Ac-Ca Fructo (MOVE FREE JOINT HEALTH ADVANCE OR), Take by mouth daily. , Disp: , Rfl:   •  Multiple Vitamins-Minerals (MULTIVITAMIN OR), Take by mouth daily. , Disp: , Rfl:   •  simvastatin 20 MG Oral Tab, Take 20 mg by mouth nightly., Disp: , Rfl:       Rev fluid wave, no abdominal bruit, no ascites, no pulsatile midline mass and no mass. There is no splenomegaly or hepatomegaly. There is no tenderness.  There is no rigidity, no rebound, no guarding, no CVA tenderness, no tenderness at McBurney's point and neg

## 2020-03-30 ENCOUNTER — PATIENT MESSAGE (OUTPATIENT)
Dept: INTERNAL MEDICINE CLINIC | Facility: CLINIC | Age: 65
End: 2020-03-30

## 2020-03-30 DIAGNOSIS — R79.89 ELEVATED LFTS: Primary | ICD-10-CM

## 2020-03-30 NOTE — TELEPHONE ENCOUNTER
From: Jose Capps  To: Nawaf Perez MD  Sent: 3/30/2020 8:50 AM CDT  Subject: Non-Urgent Medical Question    I have a question about my cholesterol medication. Could it be time for a change?  I'm experiencing some muscle soreness in my upper arms a

## 2020-04-07 ENCOUNTER — OFFICE VISIT (OUTPATIENT)
Dept: HEMATOLOGY/ONCOLOGY | Facility: HOSPITAL | Age: 65
End: 2020-04-07
Attending: INTERNAL MEDICINE
Payer: COMMERCIAL

## 2020-04-07 VITALS
SYSTOLIC BLOOD PRESSURE: 127 MMHG | HEART RATE: 75 BPM | BODY MASS INDEX: 21.44 KG/M2 | RESPIRATION RATE: 18 BRPM | DIASTOLIC BLOOD PRESSURE: 76 MMHG | OXYGEN SATURATION: 95 % | TEMPERATURE: 98 F | HEIGHT: 66.69 IN | WEIGHT: 135 LBS

## 2020-04-07 DIAGNOSIS — D47.2 MONOCLONAL GAMMOPATHY: Primary | ICD-10-CM

## 2020-04-07 DIAGNOSIS — R79.89 ELEVATED LIVER FUNCTION TESTS: ICD-10-CM

## 2020-04-07 DIAGNOSIS — D47.2 MONOCLONAL GAMMOPATHY ASSOCIATED WITH PLASMA CELL DYSCRASIA: ICD-10-CM

## 2020-04-07 PROCEDURE — 99245 OFF/OP CONSLTJ NEW/EST HI 55: CPT | Performed by: INTERNAL MEDICINE

## 2020-04-07 NOTE — PROGRESS NOTES
Hematology/Oncology Initial Consultation Note    Patient Name: Stevie Lorenzana  Medical Record Number: [id number]    YOB: 1955   Date of Consultation: 4/7/2020   PCP: Dr. Jaentt Kennedy  Other providers: Dr. Lia Webber (liver), Dr. Tania Humphries Disp: 30 capsule, Rfl: 1  Calcium Carb-Cholecalciferol (CALCIUM 600+D3 OR), Take 2 tablets by mouth daily. , Disp: , Rfl:   Cholecalciferol (VITAMIN D3) 25 MCG (1000 UT) Oral Cap, Take 1 tablet by mouth daily. , Disp: , Rfl:   BCSQTT-QUQSO-AZEZLN-FA-DIANNA 127/76 (04/07 1149)  Temp: 97.9 °F (36.6 °C) (04/07 1149)  Do Not Use - Resp Rate: --  SpO2: 95 % (04/07 1149)    Wt Readings from Last 6 Encounters:  04/07/20 : 61.2 kg (135 lb)  03/10/20 : 60.3 kg (133 lb)  03/05/20 : 60.8 kg (134 lb)  01/23/20 : 59.4 kg Component Value Date    ALYCE 7.731 (H) 03/05/2020      Lab Results   Component Value Date    [facility] 1.575 03/05/2020     Lab Results   Component Value Date    VIET 4.91 (H) 03/05/2020     Lab Results   Component Value Date    IGG 2,300 ( segment of the left trigeminal nerve resulting in minimal local deformity and inferior displacement.   A small vein is seen along the superior aspect of the anterior portion of the cisternal segment of the right trigeminal nerve without significant displace recommended. 3. Minimal chronic microvascular ischemic changes in the cerebral white matter. DEXA 1/30/19: Osteopenia with lumbar T score -1.9, total hip T score -1.7, femoral neck T score -0.8.     Impression & Plan:     *Monoclonal gammopathy  -toda

## 2020-04-07 NOTE — PROGRESS NOTES
Education Record    Learner:  Patient    Disease / Arvella Rodríguez of care    Barriers / Limitations:  None   Comments:    Method:  Discussion   Comments:    General Topics:  Plan of care reviewed   Comments:    Outcome:  Shows understanding   Comments:

## 2020-04-08 ENCOUNTER — TELEPHONE (OUTPATIENT)
Dept: HEMATOLOGY/ONCOLOGY | Facility: HOSPITAL | Age: 65
End: 2020-04-08

## 2020-04-08 NOTE — TELEPHONE ENCOUNTER
Contacted the patient regarding her upcoming PET scan. Reviewed the instructions, and encouraged her to call with any questions prior.

## 2020-04-09 PROBLEM — D47.2 MONOCLONAL GAMMOPATHY: Status: ACTIVE | Noted: 2020-04-09

## 2020-04-09 PROBLEM — D47.2 MONOCLONAL GAMMOPATHY ASSOCIATED WITH PLASMA CELL DYSCRASIA: Status: ACTIVE | Noted: 2020-04-09

## 2020-04-10 ENCOUNTER — APPOINTMENT (OUTPATIENT)
Dept: LAB | Facility: HOSPITAL | Age: 65
End: 2020-04-10
Attending: INTERNAL MEDICINE
Payer: COMMERCIAL

## 2020-04-10 DIAGNOSIS — D47.2 MONOCLONAL GAMMOPATHY ASSOCIATED WITH PLASMA CELL DYSCRASIA: ICD-10-CM

## 2020-04-10 PROCEDURE — 86335 IMMUNFIX E-PHORSIS/URINE/CSF: CPT

## 2020-04-10 PROCEDURE — 84156 ASSAY OF PROTEIN URINE: CPT

## 2020-04-10 PROCEDURE — 83883 ASSAY NEPHELOMETRY NOT SPEC: CPT

## 2020-04-15 ENCOUNTER — APPOINTMENT (OUTPATIENT)
Dept: HEMATOLOGY/ONCOLOGY | Facility: HOSPITAL | Age: 65
End: 2020-04-15
Attending: INTERNAL MEDICINE
Payer: COMMERCIAL

## 2020-04-20 ENCOUNTER — HOSPITAL ENCOUNTER (OUTPATIENT)
Dept: NUCLEAR MEDICINE | Facility: HOSPITAL | Age: 65
Discharge: HOME | End: 2020-04-20
Attending: INTERNAL MEDICINE
Payer: COMMERCIAL

## 2020-04-20 DIAGNOSIS — D47.2 MONOCLONAL GAMMOPATHY ASSOCIATED WITH PLASMA CELL DYSCRASIA: ICD-10-CM

## 2020-04-20 PROCEDURE — 78816 PET IMAGE W/CT FULL BODY: CPT | Performed by: INTERNAL MEDICINE

## 2020-04-20 PROCEDURE — 82962 GLUCOSE BLOOD TEST: CPT

## 2020-04-21 ENCOUNTER — OFFICE VISIT (OUTPATIENT)
Dept: HEMATOLOGY/ONCOLOGY | Facility: HOSPITAL | Age: 65
End: 2020-04-21
Attending: INTERNAL MEDICINE
Payer: COMMERCIAL

## 2020-04-21 VITALS
TEMPERATURE: 98 F | HEIGHT: 66.69 IN | SYSTOLIC BLOOD PRESSURE: 103 MMHG | BODY MASS INDEX: 21.44 KG/M2 | OXYGEN SATURATION: 98 % | RESPIRATION RATE: 16 BRPM | WEIGHT: 135 LBS | HEART RATE: 80 BPM | DIASTOLIC BLOOD PRESSURE: 71 MMHG

## 2020-04-21 DIAGNOSIS — R79.89 ELEVATED LFTS: ICD-10-CM

## 2020-04-21 DIAGNOSIS — D47.2 MONOCLONAL GAMMOPATHY: ICD-10-CM

## 2020-04-21 PROCEDURE — 38222 DX BONE MARROW BX & ASPIR: CPT | Performed by: INTERNAL MEDICINE

## 2020-04-21 NOTE — PROGRESS NOTES
Pt here for bone marrow biopsy. Consent signed, procedure explained. Procedure performed with no complications, pressure dressing applied. Pt given post bone marrow biopsy instructions. Pt understands. Pt discharged home in stable condition    VSS.

## 2020-04-21 NOTE — PATIENT INSTRUCTIONS
Post Bone Marrow Biopsy Instructions    Following the examination:     After your bone marrow exam, a bandage is applied to help minimize bleeding.     If you had local anesthesia, you may have to lie on your back for at least 15 minutes to apply pressu

## 2020-04-21 NOTE — PROGRESS NOTES
Bone Marrow Biopsy and Aspiration Procedure Note    Date of Service: 4/21/2020    Problem List:  Patient Active Problem List:     Trigeminal neuralgia     Elevated liver function tests     Calculus of gallbladder without cholecystitis without obstruction procedure to be performed. All equipment required was ready and available. The patient was positioned appropriately. The patient was prepped and draped in the supine position.   The skin over the left posterior iliac crest was cleansed with betadine and

## 2020-04-28 ENCOUNTER — TELEPHONE (OUTPATIENT)
Dept: HEMATOLOGY/ONCOLOGY | Facility: HOSPITAL | Age: 65
End: 2020-04-28

## 2020-04-29 ENCOUNTER — TELEPHONE (OUTPATIENT)
Dept: HEMATOLOGY/ONCOLOGY | Facility: HOSPITAL | Age: 65
End: 2020-04-29

## 2020-04-29 DIAGNOSIS — D47.2 SMOLDERING MYELOMA: Primary | ICD-10-CM

## 2020-04-29 NOTE — TELEPHONE ENCOUNTER
Today called the patient and discussed results of her bone marrow biopsy as below:    Final Diagnosis:   Bone marrow aspiration, touch preparation, clot, and biopsy:   -Mildly hypercellular bone marrow showing features consistent with a plasma cell neoplas

## 2020-05-26 ENCOUNTER — VIRTUAL PHONE E/M (OUTPATIENT)
Dept: NEUROLOGY | Facility: CLINIC | Age: 65
End: 2020-05-26
Payer: COMMERCIAL

## 2020-05-26 DIAGNOSIS — G50.0 TRIGEMINAL NEURALGIA: Primary | ICD-10-CM

## 2020-05-26 PROCEDURE — 99213 OFFICE O/P EST LOW 20 MIN: CPT | Performed by: OTHER

## 2020-05-26 NOTE — PROGRESS NOTES
Virtual Telephone Check-In    Navjot Rather verbally consents to a Virtual/Telephone Check-In visit on 05/26/20. Patient has been referred to the [facility] website at [email or web address] to review the yearly Consent to Treat document.     Patient under It has helped control her pain. She is seeing a liver specialist.   Interim   Since last, she self titrated down her gabapentin first to 2700mg total daily, then got down to 1500mg total daily.  She has since increased the gabapentin to 1800mg, as on the 15 10.0 - 20.0 23.9 (H)   CALCIUM      8.5 - 10.1 mg/dL 9.5   CALCULATED OSMOLALITY      275 - 295 mOsm/kg 288   eGFR NON-AFR.  AMERICAN      >=60 69   eGFR AFRICAN AMERICAN      >=60 80   AST (SGOT)      15 - 37 U/L 15   ALT (SGPT)      13 - 56 U/L 26   ALKAL nightly., Disp: , Rfl:       Review of Systems:   A comprehensive 10 point review of systems was completed.     Pertinent positives and negatives noted in the HPI. PHYSICAL EXAM:   Neurologic Exam  Vitals  There were no vitals filed for this visit.

## 2020-05-27 RX ORDER — GABAPENTIN 300 MG/1
CAPSULE ORAL
Qty: 540 CAPSULE | Refills: 1 | Status: SHIPPED | OUTPATIENT
Start: 2020-05-27 | End: 2020-10-07

## 2020-07-11 ENCOUNTER — LAB ENCOUNTER (OUTPATIENT)
Dept: LAB | Age: 65
End: 2020-07-11
Attending: INTERNAL MEDICINE
Payer: COMMERCIAL

## 2020-07-11 DIAGNOSIS — D47.2 SMOLDERING MYELOMA: ICD-10-CM

## 2020-07-11 LAB
ALBUMIN SERPL-MCNC: 3.6 G/DL (ref 3.4–5)
ALBUMIN/GLOB SERPL: 0.7 {RATIO} (ref 1–2)
ALP LIVER SERPL-CCNC: 81 U/L (ref 55–142)
ALT SERPL-CCNC: 32 U/L (ref 13–56)
ANION GAP SERPL CALC-SCNC: 3 MMOL/L (ref 0–18)
AST SERPL-CCNC: 28 U/L (ref 15–37)
B2 MICROGLOB SERPL-MCNC: 0.21 MG/DL (ref 0.11–0.25)
BASOPHILS # BLD AUTO: 0.07 X10(3) UL (ref 0–0.2)
BASOPHILS NFR BLD AUTO: 1.4 %
BILIRUB SERPL-MCNC: 0.6 MG/DL (ref 0.1–2)
BUN BLD-MCNC: 21 MG/DL (ref 7–18)
BUN/CREAT SERPL: 22.6 (ref 10–20)
CALCIUM BLD-MCNC: 9.5 MG/DL (ref 8.5–10.1)
CHLORIDE SERPL-SCNC: 106 MMOL/L (ref 98–112)
CO2 SERPL-SCNC: 31 MMOL/L (ref 21–32)
CREAT BLD-MCNC: 0.93 MG/DL (ref 0.55–1.02)
DEPRECATED RDW RBC AUTO: 46.6 FL (ref 35.1–46.3)
EOSINOPHIL # BLD AUTO: 0.15 X10(3) UL (ref 0–0.7)
EOSINOPHIL NFR BLD AUTO: 3 %
ERYTHROCYTE [DISTWIDTH] IN BLOOD BY AUTOMATED COUNT: 12.9 % (ref 11–15)
GLOBULIN PLAS-MCNC: 4.9 G/DL (ref 2.8–4.4)
GLUCOSE BLD-MCNC: 97 MG/DL (ref 70–99)
HCT VFR BLD AUTO: 43.3 % (ref 35–48)
HGB BLD-MCNC: 13.8 G/DL (ref 12–16)
IGA SERPL-MCNC: 281 MG/DL (ref 70–312)
IGM SERPL-MCNC: 127 MG/DL (ref 43–279)
IMM GRANULOCYTES # BLD AUTO: 0.01 X10(3) UL (ref 0–1)
IMM GRANULOCYTES NFR BLD: 0.2 %
IMMUNOGLOBULIN PNL SER-MCNC: 2150 MG/DL (ref 791–1643)
LDH SERPL L TO P-CCNC: 171 U/L
LYMPHOCYTES # BLD AUTO: 1.94 X10(3) UL (ref 1–4)
LYMPHOCYTES NFR BLD AUTO: 38.4 %
M PROTEIN MFR SERPL ELPH: 8.5 G/DL (ref 6.4–8.2)
MCH RBC QN AUTO: 31.4 PG (ref 26–34)
MCHC RBC AUTO-ENTMCNC: 31.9 G/DL (ref 31–37)
MCV RBC AUTO: 98.4 FL (ref 80–100)
MONOCYTES # BLD AUTO: 0.43 X10(3) UL (ref 0.1–1)
MONOCYTES NFR BLD AUTO: 8.5 %
NEUTROPHILS # BLD AUTO: 2.45 X10 (3) UL (ref 1.5–7.7)
NEUTROPHILS # BLD AUTO: 2.45 X10(3) UL (ref 1.5–7.7)
NEUTROPHILS NFR BLD AUTO: 48.5 %
OSMOLALITY SERPL CALC.SUM OF ELEC: 293 MOSM/KG (ref 275–295)
PATIENT FASTING Y/N/NP: NO
PLATELET # BLD AUTO: 327 10(3)UL (ref 150–450)
POTASSIUM SERPL-SCNC: 4.4 MMOL/L (ref 3.5–5.1)
RBC # BLD AUTO: 4.4 X10(6)UL (ref 3.8–5.3)
SODIUM SERPL-SCNC: 140 MMOL/L (ref 136–145)
WBC # BLD AUTO: 5.1 X10(3) UL (ref 4–11)

## 2020-07-11 PROCEDURE — 85025 COMPLETE CBC W/AUTO DIFF WBC: CPT

## 2020-07-11 PROCEDURE — 82784 ASSAY IGA/IGD/IGG/IGM EACH: CPT

## 2020-07-11 PROCEDURE — 82232 ASSAY OF BETA-2 PROTEIN: CPT

## 2020-07-11 PROCEDURE — 86334 IMMUNOFIX E-PHORESIS SERUM: CPT

## 2020-07-11 PROCEDURE — 80053 COMPREHEN METABOLIC PANEL: CPT

## 2020-07-11 PROCEDURE — 83615 LACTATE (LD) (LDH) ENZYME: CPT

## 2020-07-11 PROCEDURE — 84165 PROTEIN E-PHORESIS SERUM: CPT

## 2020-07-11 PROCEDURE — 36415 COLL VENOUS BLD VENIPUNCTURE: CPT

## 2020-07-11 PROCEDURE — 83883 ASSAY NEPHELOMETRY NOT SPEC: CPT

## 2020-07-13 LAB
ALBUMIN SERPL ELPH-MCNC: 4.37 G/DL (ref 3.75–5.21)
ALBUMIN/GLOB SERPL: 1.08 {RATIO} (ref 1–2)
ALPHA1 GLOB SERPL ELPH-MCNC: 0.34 G/DL (ref 0.19–0.46)
ALPHA2 GLOB SERPL ELPH-MCNC: 0.66 G/DL (ref 0.48–1.05)
B-GLOBULIN SERPL ELPH-MCNC: 0.9 G/DL (ref 0.68–1.23)
GAMMA GLOB SERPL ELPH-MCNC: 2.14 G/DL (ref 0.62–1.7)
KAPPA FREE LIGHT CHAIN: 5.15 MG/DL (ref 0.33–1.94)
KAPPA/LAMBDA FLC RATIO: 3.86 (ref 0.26–1.65)
LAMBDA FREE LIGHT CHAIN: 1.33 MG/DL (ref 0.57–2.63)
M PROTEIN MFR SERPL ELPH: 8.4 G/DL (ref 6.4–8.2)
M-SPIKE 1: 1.55 G/DL (ref ?–0)

## 2020-07-15 ENCOUNTER — OFFICE VISIT (OUTPATIENT)
Dept: HEMATOLOGY/ONCOLOGY | Facility: HOSPITAL | Age: 65
End: 2020-07-15
Attending: INTERNAL MEDICINE
Payer: COMMERCIAL

## 2020-07-15 VITALS
SYSTOLIC BLOOD PRESSURE: 113 MMHG | HEART RATE: 68 BPM | BODY MASS INDEX: 21.28 KG/M2 | WEIGHT: 134 LBS | TEMPERATURE: 98 F | RESPIRATION RATE: 18 BRPM | HEIGHT: 66.69 IN | OXYGEN SATURATION: 97 % | DIASTOLIC BLOOD PRESSURE: 73 MMHG

## 2020-07-15 DIAGNOSIS — D47.2 SMOLDERING MYELOMA: Primary | ICD-10-CM

## 2020-07-15 DIAGNOSIS — F41.8 ANXIETY ABOUT HEALTH: ICD-10-CM

## 2020-07-15 PROCEDURE — 99215 OFFICE O/P EST HI 40 MIN: CPT | Performed by: INTERNAL MEDICINE

## 2020-07-15 NOTE — PROGRESS NOTES
Hematology/Oncology Clinic Follow Up Visit    Patient Name: Stevie Lorenzana  Medical Record Number: [id number]    YOB: 1955   PCP: Dr. Janett Kennedy  Other providers: Dr. Lia Webber (liver), Dr. Emily Stuart (neuro)    Reason for Cons 540 capsule, Rfl: 1  Omega-3 Fatty Acids (FISH OIL) 600 MG Oral Cap, Take by mouth., Disp: , Rfl:   Calcium Carb-Cholecalciferol (CALCIUM 600+D3 OR), Take 2 tablets by mouth daily. , Disp: , Rfl:   Cholecalciferol (VITAMIN D3) 25 MCG (1000 UT) Oral Cap, Power 113/73 (07/15 1050)  Temp: 98.3 °F (36.8 °C) (07/15 1050)  Do Not Use - Resp Rate: --  SpO2: 97 % (07/15 1050)    Wt Readings from Last 6 Encounters:  07/15/20 : 60.8 kg (134 lb)  04/21/20 : 61.2 kg (135 lb)  04/07/20 : 61.2 kg (135 lb)  03/10/20 : 60.3 kg 03/05/2020     Lab Results   Component Value Date    KAPPALTCHN 5.153 (H) 07/11/2020    KAPPALTCHN 7.731 (H) 03/05/2020      Lab Results   Component Value Date    LAMBDALTCH 1.335 07/11/2020    LAMBDALTCH 1.575 03/05/2020     Lab Results   Component Value months    Yolanda Clemente MD  Hematology/Medical [address] [place] [address]

## 2020-07-16 PROBLEM — F41.8 ANXIETY ABOUT HEALTH: Status: ACTIVE | Noted: 2020-07-16

## 2020-07-16 PROBLEM — R45.89 ANXIETY ABOUT HEALTH: Status: ACTIVE | Noted: 2020-07-16

## 2020-07-25 ENCOUNTER — PATIENT MESSAGE (OUTPATIENT)
Dept: NEUROLOGY | Facility: CLINIC | Age: 65
End: 2020-07-25

## 2020-07-25 NOTE — TELEPHONE ENCOUNTER
Patient having increased TGN and needs further assessment - routed covering provider. Please see MyChart message noted below. Patient [facility] states she is currently taking 2700 mg of Gabapentin and is going to increase to 3000 mg.   (Provider ordered 18

## 2020-07-25 NOTE — TELEPHONE ENCOUNTER
From: Ang Calhoun  To: Gloria Shields DO  Sent: 7/25/2020 8:40 AM CDT  Subject: Non-Urgent Medical Question    I have been having random facial pain for the past 9 days - some jolts stronger and longer than others.  I have gradually increased my

## 2020-07-27 ENCOUNTER — TELEPHONE (OUTPATIENT)
Dept: NEUROLOGY | Facility: CLINIC | Age: 65
End: 2020-07-27

## 2020-07-27 RX ORDER — NORTRIPTYLINE HYDROCHLORIDE 25 MG/1
25 CAPSULE ORAL NIGHTLY
Qty: 30 CAPSULE | Refills: 0 | Status: SHIPPED | OUTPATIENT
Start: 2020-07-27 | End: 2020-08-24

## 2020-07-27 NOTE — TELEPHONE ENCOUNTER
Selvin Cisneros  11:45 AM                I called Ms. Venkat Hochester regarding her trigeminal pain. She states that she is taking gabapentin 900mg TID she is having worsening pains.  She cannot take tegretol due to previous elevated liver enzy

## 2020-07-27 NOTE — TELEPHONE ENCOUNTER
I called Ms. Venkat Morales regarding her trigeminal pain. She states that she is taking gabapentin 900mg TID she is having worsening pains. She cannot take tegretol due to previous elevated liver enzymes.  Even with gabapentin 900mg TID she is having significant p

## 2020-08-24 ENCOUNTER — PATIENT MESSAGE (OUTPATIENT)
Dept: NEUROLOGY | Facility: CLINIC | Age: 65
End: 2020-08-24

## 2020-08-24 DIAGNOSIS — G44.209 TENSION HEADACHE: ICD-10-CM

## 2020-08-24 DIAGNOSIS — G50.0 TRIGEMINAL NEURALGIA: Primary | ICD-10-CM

## 2020-08-24 DIAGNOSIS — G44.209 TENSION HEADACHE: Primary | ICD-10-CM

## 2020-08-24 RX ORDER — NORTRIPTYLINE HYDROCHLORIDE 25 MG/1
25 CAPSULE ORAL NIGHTLY
Qty: 30 CAPSULE | Refills: 0 | Status: SHIPPED | OUTPATIENT
Start: 2020-08-24 | End: 2020-08-24

## 2020-08-24 RX ORDER — NORTRIPTYLINE HYDROCHLORIDE 25 MG/1
50 CAPSULE ORAL NIGHTLY
Qty: 60 CAPSULE | Refills: 1 | Status: SHIPPED | OUTPATIENT
Start: 2020-08-24 | End: 2020-10-07

## 2020-08-24 NOTE — TELEPHONE ENCOUNTER
Called Ms. Elizabethore Ant regarding her TN. She states that she hates taking so much medications and reports that her pain is very severe. I will increase Nortriptyline to 50mg PO QHS and refer her to Dr. Arnel Juárez in rad onc for cyber knife evaluation.  She verbalized u

## 2020-08-24 NOTE — TELEPHONE ENCOUNTER
Medication: Nortriptyline HCl 25 MG Oral Cap    Date of last refill: 07/27/2020 (#30/0)  [place]    Last office visit: 05/26/2020  Due back to clinic per last office note:  Around 10/26/2020  Date next office visit s

## 2020-08-24 NOTE — TELEPHONE ENCOUNTER
From: Opal Rdz  To: Jasmin Bazan DO  Sent: 8/24/2020 7:53 AM CDT  Subject: Non-Urgent Medical Question    Baldo Metcalf and Edgardo - I have been having an extraordinary amount of pain the past several days.  I am currently taking 2700 mg o

## 2020-09-17 ENCOUNTER — OFFICE VISIT (OUTPATIENT)
Dept: RADIATION ONCOLOGY | Facility: HOSPITAL | Age: 65
End: 2020-09-17
Attending: RADIOLOGY
Payer: COMMERCIAL

## 2020-09-17 VITALS
TEMPERATURE: 97 F | RESPIRATION RATE: 16 BRPM | HEART RATE: 86 BPM | OXYGEN SATURATION: 96 % | SYSTOLIC BLOOD PRESSURE: 127 MMHG | WEIGHT: 136.19 LBS | BODY MASS INDEX: 22 KG/M2 | DIASTOLIC BLOOD PRESSURE: 80 MMHG

## 2020-09-17 PROCEDURE — 99212 OFFICE O/P EST SF 10 MIN: CPT

## 2020-09-17 NOTE — PROGRESS NOTES
Nursing Consultation Note  Patient: Kristian Mckeon  YOB: 1955  Age: 76year old  Radiation Oncologist: Dr. Arianna Torres  Referring Physician: Barber Hirsch  Diagnosis:No diagnosis found.   Consult Date: 9/17/2020      Chemotherapy: No  Labs • Cholecalciferol (VITAMIN D3) 25 MCG (1000 UT) Oral Cap Take 1 tablet by mouth daily. • Glucos-Chond-Hyal Ac-Ca Fructo (MOVE FREE JOINT HEALTH ADVANCE OR) Take by mouth daily. • Multiple Vitamins-Minerals (MULTIVITAMIN OR) Take by mouth daily. Used    Substance and Sexual Activity      Alcohol use: Yes        Comment: occasional, no history of excessive use      Drug use: Never      Sexual activity: Not on file    Lifestyle      Physical activity:        Days per week: Not on file        Minutes Score: 0   ;    ;      Pain Assessment:  Describe in your own words what your pain feels like: Right side of face intermittent pain. Pain started 2015 pain not severe. Pain starts to right cheek travel to the top of the head.  It is a burning sharp sensatio therapy    Expected Outcomes:  Knowledge of disease process  Knowledge of radiation therapy  Knowledge of medications    Progress Toward Outcome:  Making progress    Pamphlets/Handouts Given to Patient:  Patient tights responsibilities  Cyberknife informat

## 2020-09-17 NOTE — CONSULTS
RADIATION ONCOLOGY NOTE    DATE OF VISIT: 9/17/2020    DIAGNOSIS : Right V2 Trigeminal Neuralgia, currently on medical therapy, for consideration of Cyberknife radiosurgery.     Dear Heath Lindsey and colleagues,    Thank you very much for asking us to evalu OR) Take by mouth daily. • Multiple Vitamins-Minerals (MULTIVITAMIN OR) Take by mouth daily. • simvastatin 20 MG Oral Tab Take 20 mg by mouth nightly.          PAIN:  Pain Loc: Face(right), Pain Score: 0,     ,  ,     ,  ,      ALLERGIES :     Penic reviewed the patient's clinical, radiographic, pathologic and laboratory studies. ASSESSMENT/PLAN    77 yo with right V2 Trigeminal Neuralgia, currently on medical therapy, for consideration of Cyberknife radiosurgery.       She had tx response previousl

## 2020-09-21 DIAGNOSIS — G50.0 TRIGEMINAL NEURALGIA: Primary | ICD-10-CM

## 2020-10-01 ENCOUNTER — LAB ENCOUNTER (OUTPATIENT)
Dept: LAB | Age: 65
End: 2020-10-01
Attending: INTERNAL MEDICINE
Payer: COMMERCIAL

## 2020-10-01 ENCOUNTER — OFFICE VISIT (OUTPATIENT)
Dept: NEUROLOGY | Facility: CLINIC | Age: 65
End: 2020-10-01
Payer: COMMERCIAL

## 2020-10-01 VITALS
DIASTOLIC BLOOD PRESSURE: 66 MMHG | HEART RATE: 80 BPM | SYSTOLIC BLOOD PRESSURE: 118 MMHG | WEIGHT: 138 LBS | BODY MASS INDEX: 22 KG/M2 | RESPIRATION RATE: 16 BRPM

## 2020-10-01 DIAGNOSIS — G50.0 TRIGEMINAL NEURALGIA: Primary | ICD-10-CM

## 2020-10-01 DIAGNOSIS — D47.2 SMOLDERING MYELOMA: ICD-10-CM

## 2020-10-01 PROCEDURE — 85025 COMPLETE CBC W/AUTO DIFF WBC: CPT

## 2020-10-01 PROCEDURE — 82784 ASSAY IGA/IGD/IGG/IGM EACH: CPT

## 2020-10-01 PROCEDURE — 86334 IMMUNOFIX E-PHORESIS SERUM: CPT

## 2020-10-01 PROCEDURE — 3074F SYST BP LT 130 MM HG: CPT | Performed by: OTHER

## 2020-10-01 PROCEDURE — 83883 ASSAY NEPHELOMETRY NOT SPEC: CPT

## 2020-10-01 PROCEDURE — 84165 PROTEIN E-PHORESIS SERUM: CPT

## 2020-10-01 PROCEDURE — 36415 COLL VENOUS BLD VENIPUNCTURE: CPT

## 2020-10-01 PROCEDURE — 3078F DIAST BP <80 MM HG: CPT | Performed by: OTHER

## 2020-10-01 PROCEDURE — 99213 OFFICE O/P EST LOW 20 MIN: CPT | Performed by: OTHER

## 2020-10-01 PROCEDURE — 80053 COMPREHEN METABOLIC PANEL: CPT

## 2020-10-01 RX ORDER — NORTRIPTYLINE HYDROCHLORIDE 50 MG/1
50 CAPSULE ORAL NIGHTLY
Qty: 90 CAPSULE | Refills: 0 | Status: SHIPPED | OUTPATIENT
Start: 2020-10-01 | End: 2021-01-14

## 2020-10-01 RX ORDER — GABAPENTIN 600 MG/1
1200 TABLET ORAL 3 TIMES DAILY
Qty: 540 TABLET | Refills: 0 | Status: SHIPPED | OUTPATIENT
Start: 2020-10-01 | End: 2021-03-11

## 2020-10-01 NOTE — PROGRESS NOTES
Ryan 1827   Neurology-f/u    Alvin Palmer Patient Status:  No patient class for patient encounter    1955 MRN [id number]   [facility] PCP Guillermo Cerna MD              HPI: facial pain increased. She went up to 2700mg total daily, with persistent right facial pain. She was started on nortriptyline by Dr. Peter Leonard when I was on maternity leave, and now she is on 50mg. She was also referred for cyberknife evaluation.        Pert 37 U/L 15   ALT (SGPT)      13 - 56 U/L 26   ALKALINE PHOSPHATASE      50 - 130 U/L 78   Total Bilirubin      0.1 - 2.0 mg/dL 0.3   TOTAL PROTEIN      6.4 - 8.2 g/dL 8.3 (H)   Albumin      3.4 - 5.0 g/dL 3.6     Past Medical History:  Past Medical History: Oral Cap, Take 2 tablets 3 times a day (Patient taking differently: Take 900 mg by mouth 3 (three) times daily.  Take 2 tablets 3 times a day ), Disp: 540 capsule, Rfl: 1  •  Omega-3 Fatty Acids (FISH OIL) 600 MG Oral Cap, Take by mouth., Disp: , Rfl:   • triceps, knee jerk, and ankle jerk. Plantar responses were flexor bilaterally. Sensory exam revealed normal light touch perception. Vibratory perception and proprioception were intact at the toes.  Pinprick and temperature were normal. Romberg sign was ab

## 2020-10-02 ENCOUNTER — HOSPITAL ENCOUNTER (OUTPATIENT)
Dept: MRI IMAGING | Facility: HOSPITAL | Age: 65
Discharge: HOME | End: 2020-10-02
Attending: RADIOLOGY
Payer: COMMERCIAL

## 2020-10-02 ENCOUNTER — OFFICE VISIT (OUTPATIENT)
Dept: RADIATION ONCOLOGY | Facility: HOSPITAL | Age: 65
End: 2020-10-02
Attending: RADIOLOGY
Payer: COMMERCIAL

## 2020-10-02 DIAGNOSIS — G50.0 TRIGEMINAL NEURALGIA: ICD-10-CM

## 2020-10-02 PROCEDURE — 77334 RADIATION TREATMENT AID(S): CPT | Performed by: RADIOLOGY

## 2020-10-02 PROCEDURE — 77470 SPECIAL RADIATION TREATMENT: CPT | Performed by: RADIOLOGY

## 2020-10-02 PROCEDURE — 70553 MRI BRAIN STEM W/O & W/DYE: CPT | Performed by: RADIOLOGY

## 2020-10-02 PROCEDURE — 77290 THER RAD SIMULAJ FIELD CPLX: CPT | Performed by: RADIOLOGY

## 2020-10-02 PROCEDURE — A9575 INJ GADOTERATE MEGLUMI 0.1ML: HCPCS | Performed by: RADIOLOGY

## 2020-10-07 ENCOUNTER — OFFICE VISIT (OUTPATIENT)
Dept: HEMATOLOGY/ONCOLOGY | Facility: HOSPITAL | Age: 65
End: 2020-10-07
Attending: INTERNAL MEDICINE
Payer: COMMERCIAL

## 2020-10-07 VITALS
WEIGHT: 138.5 LBS | RESPIRATION RATE: 18 BRPM | BODY MASS INDEX: 22 KG/M2 | TEMPERATURE: 97 F | DIASTOLIC BLOOD PRESSURE: 76 MMHG | SYSTOLIC BLOOD PRESSURE: 118 MMHG | OXYGEN SATURATION: 97 % | HEART RATE: 94 BPM

## 2020-10-07 DIAGNOSIS — D47.2 SMOLDERING MYELOMA: Primary | ICD-10-CM

## 2020-10-07 DIAGNOSIS — F41.8 ANXIETY ABOUT HEALTH: ICD-10-CM

## 2020-10-07 PROCEDURE — 77295 3-D RADIOTHERAPY PLAN: CPT | Performed by: RADIOLOGY

## 2020-10-07 PROCEDURE — 99214 OFFICE O/P EST MOD 30 MIN: CPT | Performed by: INTERNAL MEDICINE

## 2020-10-07 NOTE — PROGRESS NOTES
Hematology/Oncology Clinic Follow Up Visit    Patient Name: Sabina Pace  Medical Record Number: [id number]    YOB: 1955   PCP: Dr. Julee Chand  Other providers: Dr. Tory Saldaña (liver), Dr. Melani Garcia (neuro)    Reason for Cons 10/2019    Right side of face intermittent pain. Pain started 2015 pain not severe. Pain starts to right cheek travel to the top of the head. It is a burning sharp sensation.   Pt had a dental procedure in 2015 to be fitted for new dentures and bitting darlene excessive use    Drug use: Never    Family Medical History:  Family History   Problem Relation Age of Onset   • Cancer Mother 76        Lung, +smoker   • No Known Problems Daughter    • No Known Problems Son    • No Known Problems Maternal Grandmother    • 1.01 10/01/2020    CREATSERUM 0.93 07/11/2020    CREATSERUM 0.92 04/21/2020    ANIONGAP 4 10/01/2020    GFR 65 07/28/2016    GFRNAA 58 (L) 10/01/2020    GFRAA 67 10/01/2020    CA 10.0 10/01/2020    OSMOCALC 288 10/01/2020    ALKPHO 82 10/01/2020    AST 24 for sFLC ratio <8)-  This predicts her risk for progression to multiple myeloma to be 12% at 1 yrs, 25% at 4 yrs and 50% at 10 years from now.   -Her recent repeat labs are all stable from prior.   I reassured her there is no evidence for any progression to

## 2020-10-08 ENCOUNTER — APPOINTMENT (OUTPATIENT)
Dept: HEMATOLOGY/ONCOLOGY | Facility: HOSPITAL | Age: 65
End: 2020-10-08
Attending: INTERNAL MEDICINE
Payer: COMMERCIAL

## 2020-10-27 ENCOUNTER — TELEPHONE (OUTPATIENT)
Dept: INTERNAL MEDICINE CLINIC | Facility: CLINIC | Age: 65
End: 2020-10-27

## 2020-10-27 DIAGNOSIS — Z23 NEED FOR VACCINATION AGAINST STREPTOCOCCUS PNEUMONIAE: Primary | ICD-10-CM

## 2020-10-27 NOTE — TELEPHONE ENCOUNTER
Pneumovax 23 12/2/2019     Please advise if ok for prevnar; order pended for review and approval if ok please advise.  LOV with AS 1/21/2020

## 2020-10-29 ENCOUNTER — NURSE ONLY (OUTPATIENT)
Dept: INTERNAL MEDICINE CLINIC | Facility: CLINIC | Age: 65
End: 2020-10-29
Payer: COMMERCIAL

## 2020-10-29 DIAGNOSIS — Z23 NEED FOR VACCINATION: ICD-10-CM

## 2020-10-29 PROCEDURE — 90662 IIV NO PRSV INCREASED AG IM: CPT | Performed by: INTERNAL MEDICINE

## 2020-10-29 PROCEDURE — 90670 PCV13 VACCINE IM: CPT | Performed by: INTERNAL MEDICINE

## 2020-10-29 PROCEDURE — 90471 IMMUNIZATION ADMIN: CPT | Performed by: INTERNAL MEDICINE

## 2020-10-29 PROCEDURE — 90472 IMMUNIZATION ADMIN EACH ADD: CPT | Performed by: INTERNAL MEDICINE

## 2020-10-30 ENCOUNTER — APPOINTMENT (OUTPATIENT)
Dept: RADIATION ONCOLOGY | Facility: HOSPITAL | Age: 65
End: 2020-10-30
Attending: RADIOLOGY
Payer: COMMERCIAL

## 2020-10-30 VITALS
OXYGEN SATURATION: 98 % | SYSTOLIC BLOOD PRESSURE: 125 MMHG | DIASTOLIC BLOOD PRESSURE: 75 MMHG | TEMPERATURE: 97 F | HEART RATE: 82 BPM | BODY MASS INDEX: 22 KG/M2 | WEIGHT: 140 LBS | RESPIRATION RATE: 16 BRPM

## 2020-10-30 PROCEDURE — 77280 THER RAD SIMULAJ FIELD SMPL: CPT | Performed by: RADIOLOGY

## 2020-10-30 PROCEDURE — 77300 RADIATION THERAPY DOSE PLAN: CPT | Performed by: RADIOLOGY

## 2020-10-30 PROCEDURE — 77336 RADIATION PHYSICS CONSULT: CPT | Performed by: RADIOLOGY

## 2020-10-30 PROCEDURE — 77334 RADIATION TREATMENT AID(S): CPT | Performed by: RADIOLOGY

## 2020-10-30 PROCEDURE — 77372 SRS LINEAR BASED: CPT | Performed by: RADIOLOGY

## 2020-10-30 NOTE — PROGRESS NOTES
RADIATION THERAPY/CYBERKNIFE SRS COMPLETION SUMMARY NOTE    DIAGNOSIS : Right V2 Trigeminal Neuralgia, currently on medical therapy, s/p Cyberknife radiosurgery on 10/30/2020.     Dear Heath Delaney and colleagues,    Thank you very much for asking us to teresa

## 2020-10-30 NOTE — PROGRESS NOTES
[facility] Radiation Treatment Management Note 1-5    Patient:  Stevie Lorenzana  Age:  76year old  Visit Diagnosis:  No diagnosis found.   Primary Rad/Onc:  Dr. Paresh Eller Contreras    Site Delivered Dose (cGy) Prescribed Dose (cGy) Fraction #

## 2020-12-11 ENCOUNTER — OFFICE VISIT (OUTPATIENT)
Dept: NEUROLOGY | Facility: CLINIC | Age: 65
End: 2020-12-11
Payer: COMMERCIAL

## 2020-12-11 VITALS
SYSTOLIC BLOOD PRESSURE: 120 MMHG | BODY MASS INDEX: 21.69 KG/M2 | HEIGHT: 66 IN | DIASTOLIC BLOOD PRESSURE: 68 MMHG | OXYGEN SATURATION: 98 % | HEART RATE: 80 BPM | RESPIRATION RATE: 16 BRPM | WEIGHT: 135 LBS

## 2020-12-11 DIAGNOSIS — G50.0 TRIGEMINAL NEURALGIA: Primary | ICD-10-CM

## 2020-12-11 PROCEDURE — 3008F BODY MASS INDEX DOCD: CPT | Performed by: OTHER

## 2020-12-11 PROCEDURE — 3074F SYST BP LT 130 MM HG: CPT | Performed by: OTHER

## 2020-12-11 PROCEDURE — 3078F DIAST BP <80 MM HG: CPT | Performed by: OTHER

## 2020-12-11 PROCEDURE — 99213 OFFICE O/P EST LOW 20 MIN: CPT | Performed by: OTHER

## 2020-12-11 RX ORDER — NORTRIPTYLINE HYDROCHLORIDE 10 MG/1
CAPSULE ORAL
Qty: 70 CAPSULE | Refills: 1 | Status: SHIPPED | OUTPATIENT
Start: 2020-12-11 | End: 2021-03-05

## 2020-12-11 NOTE — PROGRESS NOTES
Ryan 1827   Neurology-f/u    [facility] Patient Status:  No patient class for patient encounter    1955 MRN [id number]   Location Ad Garcia MD              HPI: facial pain increased. She went up to 2700mg total daily, with persistent right facial pain. She was started on nortriptyline by Dr. Alycia Mir when I was on maternity leave, and now she is on 50mg. She was also referred for cyberknife evaluation.    Interim Ratio      10.0 - 20.0 23.9 (H)   CALCIUM      8.5 - 10.1 mg/dL 9.5   CALCULATED OSMOLALITY      275 - 295 mOsm/kg 288   eGFR NON-AFR.  AMERICAN      >=60 69   eGFR AFRICAN AMERICAN      >=60 80   AST (SGOT)      15 - 37 U/L 15   ALT (SGPT)      13 - 56 U/L daily.  900mg 900mg 1200mg   Doses ), Disp: 540 tablet, Rfl: 0  •  Nortriptyline HCl 50 MG Oral Cap, Take 1 capsule (50 mg total) by mouth nightly., Disp: 90 capsule, Rfl: 0  •  Omega-3 Fatty Acids (FISH OIL) 600 MG Oral Cap, Take by mouth., Disp: , Rfl: brachioradialis, triceps, knee jerk, and ankle jerk. Plantar responses were flexor bilaterally. Sensory exam revealed normal light touch perception. Vibratory perception and proprioception were intact at the toes.  Pinprick and temperature were normal. Ro

## 2020-12-30 ENCOUNTER — LAB ENCOUNTER (OUTPATIENT)
Dept: LAB | Age: 65
End: 2020-12-30
Attending: INTERNAL MEDICINE
Payer: COMMERCIAL

## 2020-12-30 DIAGNOSIS — D47.2 SMOLDERING MYELOMA: ICD-10-CM

## 2020-12-30 LAB
ALBUMIN SERPL-MCNC: 3.6 G/DL (ref 3.4–5)
ALBUMIN/GLOB SERPL: 0.7 {RATIO} (ref 1–2)
ALP LIVER SERPL-CCNC: 90 U/L
ALT SERPL-CCNC: 40 U/L
ANION GAP SERPL CALC-SCNC: 3 MMOL/L (ref 0–18)
AST SERPL-CCNC: 31 U/L (ref 15–37)
BASOPHILS # BLD AUTO: 0.05 X10(3) UL (ref 0–0.2)
BASOPHILS NFR BLD AUTO: 0.9 %
BILIRUB SERPL-MCNC: 0.4 MG/DL (ref 0.1–2)
BUN BLD-MCNC: 23 MG/DL (ref 7–18)
BUN/CREAT SERPL: 21.9 (ref 10–20)
CALCIUM BLD-MCNC: 9.9 MG/DL (ref 8.5–10.1)
CHLORIDE SERPL-SCNC: 105 MMOL/L (ref 98–112)
CO2 SERPL-SCNC: 28 MMOL/L (ref 21–32)
CREAT BLD-MCNC: 1.05 MG/DL
DEPRECATED RDW RBC AUTO: 44.9 FL (ref 35.1–46.3)
EOSINOPHIL # BLD AUTO: 0.14 X10(3) UL (ref 0–0.7)
EOSINOPHIL NFR BLD AUTO: 2.5 %
ERYTHROCYTE [DISTWIDTH] IN BLOOD BY AUTOMATED COUNT: 12.7 % (ref 11–15)
GLOBULIN PLAS-MCNC: 5.2 G/DL (ref 2.8–4.4)
GLUCOSE BLD-MCNC: 92 MG/DL (ref 70–99)
HCT VFR BLD AUTO: 42.3 %
HGB BLD-MCNC: 13.6 G/DL
IGA SERPL-MCNC: 260 MG/DL (ref 70–312)
IGM SERPL-MCNC: 121 MG/DL (ref 43–279)
IMM GRANULOCYTES # BLD AUTO: 0.02 X10(3) UL (ref 0–1)
IMM GRANULOCYTES NFR BLD: 0.4 %
IMMUNOGLOBULIN PNL SER-MCNC: 2340 MG/DL (ref 791–1643)
LYMPHOCYTES # BLD AUTO: 1.65 X10(3) UL (ref 1–4)
LYMPHOCYTES NFR BLD AUTO: 29.9 %
M PROTEIN MFR SERPL ELPH: 8.8 G/DL (ref 6.4–8.2)
MCH RBC QN AUTO: 30.8 PG (ref 26–34)
MCHC RBC AUTO-ENTMCNC: 32.2 G/DL (ref 31–37)
MCV RBC AUTO: 95.9 FL
MONOCYTES # BLD AUTO: 0.5 X10(3) UL (ref 0.1–1)
MONOCYTES NFR BLD AUTO: 9.1 %
NEUTROPHILS # BLD AUTO: 3.15 X10 (3) UL (ref 1.5–7.7)
NEUTROPHILS # BLD AUTO: 3.15 X10(3) UL (ref 1.5–7.7)
NEUTROPHILS NFR BLD AUTO: 57.2 %
OSMOLALITY SERPL CALC.SUM OF ELEC: 285 MOSM/KG (ref 275–295)
PATIENT FASTING Y/N/NP: YES
PLATELET # BLD AUTO: 347 10(3)UL (ref 150–450)
POTASSIUM SERPL-SCNC: 4.7 MMOL/L (ref 3.5–5.1)
RBC # BLD AUTO: 4.41 X10(6)UL
SODIUM SERPL-SCNC: 136 MMOL/L (ref 136–145)
WBC # BLD AUTO: 5.5 X10(3) UL (ref 4–11)

## 2020-12-30 PROCEDURE — 84165 PROTEIN E-PHORESIS SERUM: CPT

## 2020-12-30 PROCEDURE — 83883 ASSAY NEPHELOMETRY NOT SPEC: CPT

## 2020-12-30 PROCEDURE — 85025 COMPLETE CBC W/AUTO DIFF WBC: CPT

## 2020-12-30 PROCEDURE — 86334 IMMUNOFIX E-PHORESIS SERUM: CPT

## 2020-12-30 PROCEDURE — 80053 COMPREHEN METABOLIC PANEL: CPT

## 2020-12-30 PROCEDURE — 82784 ASSAY IGA/IGD/IGG/IGM EACH: CPT

## 2020-12-30 PROCEDURE — 36415 COLL VENOUS BLD VENIPUNCTURE: CPT

## 2021-01-04 LAB
ALBUMIN SERPL ELPH-MCNC: 4.45 G/DL (ref 3.75–5.21)
ALBUMIN/GLOB SERPL: 1.07 {RATIO} (ref 1–2)
ALPHA1 GLOB SERPL ELPH-MCNC: 0.34 G/DL (ref 0.19–0.46)
ALPHA2 GLOB SERPL ELPH-MCNC: 0.76 G/DL (ref 0.48–1.05)
B-GLOBULIN SERPL ELPH-MCNC: 0.91 G/DL (ref 0.68–1.23)
GAMMA GLOB SERPL ELPH-MCNC: 2.13 G/DL (ref 0.62–1.7)
KAPPA LC FREE SER-MCNC: 8.85 MG/DL (ref 0.33–1.94)
KAPPA LC FREE/LAMBDA FREE SER NEPH: 4.54 {RATIO} (ref 0.26–1.65)
LAMBDA LC FREE SERPL-MCNC: 1.95 MG/DL (ref 0.57–2.63)
M PROTEIN MFR SERPL ELPH: 8.6 G/DL (ref 6.4–8.2)
M-SPIKE 1: 1.57 G/DL (ref ?–0)

## 2021-01-14 ENCOUNTER — OFFICE VISIT (OUTPATIENT)
Dept: HEMATOLOGY/ONCOLOGY | Facility: HOSPITAL | Age: 66
End: 2021-01-14
Attending: INTERNAL MEDICINE
Payer: COMMERCIAL

## 2021-01-14 VITALS
WEIGHT: 141.63 LBS | HEART RATE: 89 BPM | OXYGEN SATURATION: 97 % | RESPIRATION RATE: 18 BRPM | HEIGHT: 66.69 IN | DIASTOLIC BLOOD PRESSURE: 82 MMHG | BODY MASS INDEX: 22.49 KG/M2 | TEMPERATURE: 99 F | SYSTOLIC BLOOD PRESSURE: 137 MMHG

## 2021-01-14 DIAGNOSIS — D47.2 MONOCLONAL GAMMOPATHY ASSOCIATED WITH PLASMA CELL DYSCRASIA: ICD-10-CM

## 2021-01-14 DIAGNOSIS — D47.2 SMOLDERING MYELOMA: Primary | ICD-10-CM

## 2021-01-14 PROCEDURE — 99214 OFFICE O/P EST MOD 30 MIN: CPT | Performed by: INTERNAL MEDICINE

## 2021-02-15 ENCOUNTER — PATIENT MESSAGE (OUTPATIENT)
Dept: NEUROLOGY | Facility: CLINIC | Age: 66
End: 2021-02-15

## 2021-02-15 NOTE — TELEPHONE ENCOUNTER
From: Ho Issa  To: Norberto Wallace DO  Sent: 2/15/2021 12:46 PM CST  Subject: Non-Urgent Medical Question    Dr Luis Carrera - As i wean off the Nortriptyline, I experienced the breaking point this week-end.  I started on 50 mg of Nortriptyline

## 2021-02-15 NOTE — TELEPHONE ENCOUNTER
Toby Burns,  1 minute ago (12:47 PM)        I set up another consultation with Dr Jenna Martin for the possibility of another Cyberknife procedure. Not sure what else to do.   This disease or illness or condition I have is driving me Billy Blair

## 2021-02-19 ENCOUNTER — OFFICE VISIT (OUTPATIENT)
Dept: INTERNAL MEDICINE CLINIC | Facility: CLINIC | Age: 66
End: 2021-02-19
Payer: COMMERCIAL

## 2021-02-19 ENCOUNTER — VIRTUAL PHONE E/M (OUTPATIENT)
Dept: RADIATION ONCOLOGY | Facility: HOSPITAL | Age: 66
End: 2021-02-19
Attending: RADIOLOGY
Payer: COMMERCIAL

## 2021-02-19 VITALS
DIASTOLIC BLOOD PRESSURE: 78 MMHG | SYSTOLIC BLOOD PRESSURE: 108 MMHG | RESPIRATION RATE: 18 BRPM | HEIGHT: 66.69 IN | WEIGHT: 141 LBS | HEART RATE: 80 BPM | BODY MASS INDEX: 22.39 KG/M2 | TEMPERATURE: 96 F

## 2021-02-19 DIAGNOSIS — Z12.11 SCREEN FOR COLON CANCER: ICD-10-CM

## 2021-02-19 DIAGNOSIS — D47.2 MONOCLONAL GAMMOPATHY ASSOCIATED WITH PLASMA CELL DYSCRASIA: ICD-10-CM

## 2021-02-19 DIAGNOSIS — G50.0 TRIGEMINAL NEURALGIA: ICD-10-CM

## 2021-02-19 DIAGNOSIS — E78.00 HIGH CHOLESTEROL: ICD-10-CM

## 2021-02-19 DIAGNOSIS — D47.2 SMOLDERING MYELOMA: ICD-10-CM

## 2021-02-19 DIAGNOSIS — Z00.00 PE (PHYSICAL EXAM), ANNUAL: Primary | ICD-10-CM

## 2021-02-19 DIAGNOSIS — G50.0 TRIGEMINAL NEURALGIA: Primary | ICD-10-CM

## 2021-02-19 PROCEDURE — 3078F DIAST BP <80 MM HG: CPT | Performed by: INTERNAL MEDICINE

## 2021-02-19 PROCEDURE — 3008F BODY MASS INDEX DOCD: CPT | Performed by: INTERNAL MEDICINE

## 2021-02-19 PROCEDURE — 3074F SYST BP LT 130 MM HG: CPT | Performed by: INTERNAL MEDICINE

## 2021-02-19 PROCEDURE — 99397 PER PM REEVAL EST PAT 65+ YR: CPT | Performed by: INTERNAL MEDICINE

## 2021-02-19 NOTE — PROGRESS NOTES
Patient presents with:  Wellness Visit: MR rm 8 annual pe       HPI:  Here for cpe. 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 Pt taking chol med every other day, due for labs. in 2015 to be fitted for new dentures and bitting down on cold mold she began to then have pain. Triggers talking, eating, drinking, touch to face, brushing teeth, and cold wind hitting her face.       Past Surgical History:   Procedure Laterality Date   • Comment:Occurred when she was a child, thinks its a rash    Health Maintenance  Immunizations:    Immunization History  Administered            Date(s) Administered    Covid-19 Vaccine Moderna 100 mcg/0.5 ml                          02/15/2021      FLU VAC diagnosis)  High cholesterol  Continues to work with neuro on neuraolgi, doig well durrently  Continue f/u with heme for smoldering myeloma  Rpt labs now for chol  No orders of the defined types were placed in this encounter.       Meds & Refills for this V

## 2021-02-19 NOTE — PROGRESS NOTES
RADIATION ONCOLOGY NOTE    DATE OF VISIT: 2/19/2021    DIAGNOSIS : Right V2 Trigeminal Neuralgia, currently on medical therapy, s/p Cyberknife radiosurgery on 10/30/2020.     Dear Heath Valdez and colleagues,    As you recall, Marcos Hartley is a Alaina Mercury

## 2021-02-22 ENCOUNTER — LAB ENCOUNTER (OUTPATIENT)
Dept: LAB | Age: 66
End: 2021-02-22
Attending: INTERNAL MEDICINE
Payer: COMMERCIAL

## 2021-02-22 DIAGNOSIS — Z13.29 SCREENING FOR THYROID DISORDER: ICD-10-CM

## 2021-02-22 DIAGNOSIS — Z00.00 ROUTINE GENERAL MEDICAL EXAMINATION AT A HEALTH CARE FACILITY: ICD-10-CM

## 2021-02-22 DIAGNOSIS — Z13.220 SCREENING FOR LIPID DISORDERS: ICD-10-CM

## 2021-02-22 DIAGNOSIS — Z13.0 SCREENING FOR BLOOD DISEASE: ICD-10-CM

## 2021-02-22 DIAGNOSIS — Z13.228 SCREENING FOR METABOLIC DISORDER: ICD-10-CM

## 2021-02-22 LAB
ALBUMIN SERPL-MCNC: 3.4 G/DL (ref 3.4–5)
ALBUMIN/GLOB SERPL: 0.7 {RATIO} (ref 1–2)
ALP LIVER SERPL-CCNC: 78 U/L
ALT SERPL-CCNC: 27 U/L
ANION GAP SERPL CALC-SCNC: 4 MMOL/L (ref 0–18)
AST SERPL-CCNC: 20 U/L (ref 15–37)
BASOPHILS # BLD AUTO: 0.08 X10(3) UL (ref 0–0.2)
BASOPHILS NFR BLD AUTO: 1.5 %
BILIRUB SERPL-MCNC: 0.4 MG/DL (ref 0.1–2)
BUN BLD-MCNC: 24 MG/DL (ref 7–18)
BUN/CREAT SERPL: 17.9 (ref 10–20)
CALCIUM BLD-MCNC: 9.4 MG/DL (ref 8.5–10.1)
CHLORIDE SERPL-SCNC: 106 MMOL/L (ref 98–112)
CHOLEST SMN-MCNC: 178 MG/DL (ref ?–200)
CO2 SERPL-SCNC: 29 MMOL/L (ref 21–32)
CREAT BLD-MCNC: 1.34 MG/DL
DEPRECATED RDW RBC AUTO: 45 FL (ref 35.1–46.3)
EOSINOPHIL # BLD AUTO: 0.15 X10(3) UL (ref 0–0.7)
EOSINOPHIL NFR BLD AUTO: 2.9 %
ERYTHROCYTE [DISTWIDTH] IN BLOOD BY AUTOMATED COUNT: 12.4 % (ref 11–15)
GLOBULIN PLAS-MCNC: 4.9 G/DL (ref 2.8–4.4)
GLUCOSE BLD-MCNC: 81 MG/DL (ref 70–99)
HCT VFR BLD AUTO: 41.4 %
HDLC SERPL-MCNC: 68 MG/DL (ref 40–59)
HGB BLD-MCNC: 13.1 G/DL
IMM GRANULOCYTES # BLD AUTO: 0.01 X10(3) UL (ref 0–1)
IMM GRANULOCYTES NFR BLD: 0.2 %
LDLC SERPL CALC-MCNC: 89 MG/DL (ref ?–100)
LYMPHOCYTES # BLD AUTO: 1.95 X10(3) UL (ref 1–4)
LYMPHOCYTES NFR BLD AUTO: 37.2 %
M PROTEIN MFR SERPL ELPH: 8.3 G/DL (ref 6.4–8.2)
MCH RBC QN AUTO: 31.3 PG (ref 26–34)
MCHC RBC AUTO-ENTMCNC: 31.6 G/DL (ref 31–37)
MCV RBC AUTO: 99 FL
MONOCYTES # BLD AUTO: 0.46 X10(3) UL (ref 0.1–1)
MONOCYTES NFR BLD AUTO: 8.8 %
NEUTROPHILS # BLD AUTO: 2.59 X10 (3) UL (ref 1.5–7.7)
NEUTROPHILS # BLD AUTO: 2.59 X10(3) UL (ref 1.5–7.7)
NEUTROPHILS NFR BLD AUTO: 49.4 %
NONHDLC SERPL-MCNC: 110 MG/DL (ref ?–130)
OSMOLALITY SERPL CALC.SUM OF ELEC: 291 MOSM/KG (ref 275–295)
PATIENT FASTING Y/N/NP: YES
PATIENT FASTING Y/N/NP: YES
PLATELET # BLD AUTO: 307 10(3)UL (ref 150–450)
POTASSIUM SERPL-SCNC: 4.8 MMOL/L (ref 3.5–5.1)
RBC # BLD AUTO: 4.18 X10(6)UL
SODIUM SERPL-SCNC: 139 MMOL/L (ref 136–145)
T4 FREE SERPL-MCNC: 0.8 NG/DL (ref 0.8–1.7)
TRIGL SERPL-MCNC: 103 MG/DL (ref 30–149)
TSI SER-ACNC: 9.98 MIU/ML (ref 0.36–3.74)
VLDLC SERPL CALC-MCNC: 21 MG/DL (ref 0–30)
WBC # BLD AUTO: 5.2 X10(3) UL (ref 4–11)

## 2021-02-22 PROCEDURE — 84443 ASSAY THYROID STIM HORMONE: CPT

## 2021-02-22 PROCEDURE — 36415 COLL VENOUS BLD VENIPUNCTURE: CPT

## 2021-02-22 PROCEDURE — 80061 LIPID PANEL: CPT

## 2021-02-22 PROCEDURE — 85025 COMPLETE CBC W/AUTO DIFF WBC: CPT

## 2021-02-22 PROCEDURE — 80053 COMPREHEN METABOLIC PANEL: CPT

## 2021-02-22 PROCEDURE — 84439 ASSAY OF FREE THYROXINE: CPT

## 2021-02-26 ENCOUNTER — APPOINTMENT (OUTPATIENT)
Dept: RADIATION ONCOLOGY | Facility: HOSPITAL | Age: 66
End: 2021-02-26
Attending: RADIOLOGY
Payer: COMMERCIAL

## 2021-03-05 DIAGNOSIS — G44.209 TENSION HEADACHE: Primary | ICD-10-CM

## 2021-03-05 RX ORDER — NORTRIPTYLINE HYDROCHLORIDE 10 MG/1
CAPSULE ORAL
Qty: 60 CAPSULE | Refills: 2 | Status: SHIPPED | OUTPATIENT
Start: 2021-03-05 | End: 2021-03-11

## 2021-03-05 NOTE — TELEPHONE ENCOUNTER
Please see TE 02/24/2021.      Medication: Nortriptyline HCl 10 MG Oral Cap    Date of last refill: 12/11/2020 (#70/1)  [place]    Last office visit: 12/11/2020  Due back to clinic per last office note:  Around 03/11/

## 2021-03-09 ENCOUNTER — TELEPHONE (OUTPATIENT)
Dept: INTERNAL MEDICINE CLINIC | Facility: CLINIC | Age: 66
End: 2021-03-09

## 2021-03-11 ENCOUNTER — OFFICE VISIT (OUTPATIENT)
Dept: NEUROLOGY | Facility: CLINIC | Age: 66
End: 2021-03-11
Payer: COMMERCIAL

## 2021-03-11 VITALS
BODY MASS INDEX: 22 KG/M2 | HEART RATE: 76 BPM | WEIGHT: 136 LBS | RESPIRATION RATE: 16 BRPM | DIASTOLIC BLOOD PRESSURE: 68 MMHG | SYSTOLIC BLOOD PRESSURE: 120 MMHG

## 2021-03-11 DIAGNOSIS — G44.209 TENSION HEADACHE: ICD-10-CM

## 2021-03-11 DIAGNOSIS — G50.0 TRIGEMINAL NEURALGIA: Primary | ICD-10-CM

## 2021-03-11 DIAGNOSIS — N28.9 RENAL INSUFFICIENCY: ICD-10-CM

## 2021-03-11 PROCEDURE — 99214 OFFICE O/P EST MOD 30 MIN: CPT | Performed by: OTHER

## 2021-03-11 PROCEDURE — 3078F DIAST BP <80 MM HG: CPT | Performed by: OTHER

## 2021-03-11 PROCEDURE — 3074F SYST BP LT 130 MM HG: CPT | Performed by: OTHER

## 2021-03-11 RX ORDER — GABAPENTIN 600 MG/1
TABLET ORAL
Qty: 315 TABLET | Refills: 1 | Status: SHIPPED | OUTPATIENT
Start: 2021-03-11 | End: 2021-12-07

## 2021-03-11 RX ORDER — NORTRIPTYLINE HYDROCHLORIDE 10 MG/1
CAPSULE ORAL
Qty: 180 CAPSULE | Refills: 1 | Status: SHIPPED | OUTPATIENT
Start: 2021-03-11 | End: 2021-12-28

## 2021-03-11 NOTE — PROGRESS NOTES
Ryan 1827   Neurology-f/u    Tj Mcfarland Patient Status:  No patient class for patient encounter    1955 MRN [id number]   Location Lily Delgado MD              HPI: facial pain increased. She went up to 2700mg total daily, with persistent right facial pain. She was started on nortriptyline by Dr. Julia Steven when I was on maternity leave, and now she is on 50mg. She was also referred for cyberknife evaluation.    Interim 5.1 mmol/L 4.5   Chloride      98 - 112 mmol/L 103   Carbon Dioxide, Total      21.0 - 32.0 mmol/L 32.0   ANION GAP      0 - 18 mmol/L 3   BUN      7 - 18 mg/dL 21 (H)   CREATININE      0.55 - 1.02 mg/dL 0.88   BUN/CREAT Ratio      10.0 - 20.0 23.9 (H)   C thinks its a rash    MEDICATIONS:    Current Outpatient Medications:   •  Nortriptyline HCl 10 MG Oral Cap, Take two capsules nightly, Disp: 180 capsule, Rfl: 1  •  gabapentin 600 MG Oral Tab, Take 600mg in am, 600mg at midday, and 900mg in the evening, Ho Yuan proximal and distal muscles of the arms and legs. Deep tendon reflexes were 2 at the biceps, brachioradialis, triceps, knee jerk, and ankle jerk. Plantar responses were flexor bilaterally. Sensory exam revealed normal light touch perception.  Vibratory p

## 2021-03-15 ENCOUNTER — TELEPHONE (OUTPATIENT)
Dept: NEUROLOGY | Facility: CLINIC | Age: 66
End: 2021-03-15

## 2021-03-15 NOTE — TELEPHONE ENCOUNTER
Rx was sent to [facility] already. Spoke initially with Ye Nielsen, states they never received. Transferred to [facility], pharmacist. Rx was called in with read back.      Medication: Gabapentin     Date of last refill: 3/11/21 for #315/1 additional refill  Date la

## 2021-03-18 ENCOUNTER — TELEPHONE (OUTPATIENT)
Dept: NEUROLOGY | Facility: CLINIC | Age: 66
End: 2021-03-18

## 2021-03-18 NOTE — TELEPHONE ENCOUNTER
Spoke with [facility] who states they did not have the prescription on file. Called in the prescription. Dneg confirmed a reback.      Medication: NORTRIPTYLINE 10 MG CAP    Date of last refill: 03/11/2021 (#180/1)  [place]

## 2021-03-19 ENCOUNTER — OFFICE VISIT (OUTPATIENT)
Dept: INTERNAL MEDICINE CLINIC | Facility: CLINIC | Age: 66
End: 2021-03-19
Payer: COMMERCIAL

## 2021-03-19 VITALS
BODY MASS INDEX: 22.5 KG/M2 | SYSTOLIC BLOOD PRESSURE: 118 MMHG | WEIGHT: 140 LBS | HEIGHT: 66 IN | DIASTOLIC BLOOD PRESSURE: 72 MMHG | TEMPERATURE: 98 F | HEART RATE: 80 BPM | RESPIRATION RATE: 16 BRPM | OXYGEN SATURATION: 98 %

## 2021-03-19 DIAGNOSIS — R79.89 CREATININE ELEVATION: ICD-10-CM

## 2021-03-19 DIAGNOSIS — E03.8 SUBCLINICAL HYPOTHYROIDISM: Primary | ICD-10-CM

## 2021-03-19 DIAGNOSIS — D47.2 SMOLDERING MYELOMA: ICD-10-CM

## 2021-03-19 DIAGNOSIS — Z12.11 SCREEN FOR COLON CANCER: ICD-10-CM

## 2021-03-19 DIAGNOSIS — G50.0 TRIGEMINAL NEURALGIA: ICD-10-CM

## 2021-03-19 PROCEDURE — 3008F BODY MASS INDEX DOCD: CPT | Performed by: INTERNAL MEDICINE

## 2021-03-19 PROCEDURE — 99214 OFFICE O/P EST MOD 30 MIN: CPT | Performed by: INTERNAL MEDICINE

## 2021-03-19 PROCEDURE — 3074F SYST BP LT 130 MM HG: CPT | Performed by: INTERNAL MEDICINE

## 2021-03-19 PROCEDURE — 3078F DIAST BP <80 MM HG: CPT | Performed by: INTERNAL MEDICINE

## 2021-03-19 NOTE — PROGRESS NOTES
Patient presents with: Follow - Up: VASYL rm 8 f/u labs      HPI:  Here for f/u of abnormal labs, cr up and tsh up. Notes fatigue, but equates that with her meds for trigeminal neuraolgia. Not taking nsaids. No other coplaints.      Review of Systems   No f/c MG Oral Tab Take 20 mg by mouth nightly.  Takes every other day          Physical Exam  /72   Pulse 80   Temp 97.7 °F (36.5 °C)   Resp 16   Ht 5' 6\" (1.676 m)   Wt 140 lb (63.5 kg)   SpO2 98%   BMI 22.60 kg/m²   Constitutional: Oriented to person, pl

## 2021-03-21 ENCOUNTER — LAB ENCOUNTER (OUTPATIENT)
Dept: LAB | Facility: HOSPITAL | Age: 66
End: 2021-03-21
Attending: INTERNAL MEDICINE
Payer: COMMERCIAL

## 2021-03-21 DIAGNOSIS — Z12.11 SCREEN FOR COLON CANCER: ICD-10-CM

## 2021-03-21 PROCEDURE — 82274 ASSAY TEST FOR BLOOD FECAL: CPT

## 2021-03-22 ENCOUNTER — LAB ENCOUNTER (OUTPATIENT)
Dept: LAB | Age: 66
End: 2021-03-22
Attending: INTERNAL MEDICINE
Payer: COMMERCIAL

## 2021-03-22 DIAGNOSIS — E03.8 SUBCLINICAL HYPOTHYROIDISM: ICD-10-CM

## 2021-03-22 DIAGNOSIS — R79.89 CREATININE ELEVATION: ICD-10-CM

## 2021-03-22 LAB
ANION GAP SERPL CALC-SCNC: 1 MMOL/L (ref 0–18)
BUN BLD-MCNC: 22 MG/DL (ref 7–18)
BUN/CREAT SERPL: 25.6 (ref 10–20)
CALCIUM BLD-MCNC: 9.8 MG/DL (ref 8.5–10.1)
CHLORIDE SERPL-SCNC: 105 MMOL/L (ref 98–112)
CO2 SERPL-SCNC: 32 MMOL/L (ref 21–32)
CREAT BLD-MCNC: 0.86 MG/DL
GLUCOSE BLD-MCNC: 79 MG/DL (ref 70–99)
OSMOLALITY SERPL CALC.SUM OF ELEC: 288 MOSM/KG (ref 275–295)
PATIENT FASTING Y/N/NP: YES
POTASSIUM SERPL-SCNC: 4.8 MMOL/L (ref 3.5–5.1)
SODIUM SERPL-SCNC: 138 MMOL/L (ref 136–145)
T4 FREE SERPL-MCNC: 0.7 NG/DL (ref 0.8–1.7)
TSI SER-ACNC: 6.59 MIU/ML (ref 0.36–3.74)

## 2021-03-22 PROCEDURE — 84439 ASSAY OF FREE THYROXINE: CPT

## 2021-03-22 PROCEDURE — 84443 ASSAY THYROID STIM HORMONE: CPT

## 2021-03-22 PROCEDURE — 36415 COLL VENOUS BLD VENIPUNCTURE: CPT

## 2021-03-22 PROCEDURE — 80048 BASIC METABOLIC PNL TOTAL CA: CPT

## 2021-03-24 LAB — HEMOCCULT STL QL: NEGATIVE

## 2021-04-12 ENCOUNTER — HOSPITAL ENCOUNTER (OUTPATIENT)
Dept: NUCLEAR MEDICINE | Facility: HOSPITAL | Age: 66
Discharge: HOME | End: 2021-04-12
Attending: INTERNAL MEDICINE
Payer: COMMERCIAL

## 2021-04-12 ENCOUNTER — LAB ENCOUNTER (OUTPATIENT)
Dept: LAB | Age: 66
End: 2021-04-12
Attending: INTERNAL MEDICINE
Payer: COMMERCIAL

## 2021-04-12 DIAGNOSIS — D47.2 SMOLDERING MYELOMA: ICD-10-CM

## 2021-04-12 DIAGNOSIS — D47.2 MONOCLONAL GAMMOPATHY ASSOCIATED WITH PLASMA CELL DYSCRASIA: ICD-10-CM

## 2021-04-12 PROCEDURE — 83883 ASSAY NEPHELOMETRY NOT SPEC: CPT

## 2021-04-12 PROCEDURE — 78816 PET IMAGE W/CT FULL BODY: CPT | Performed by: INTERNAL MEDICINE

## 2021-04-12 PROCEDURE — 84165 PROTEIN E-PHORESIS SERUM: CPT

## 2021-04-12 PROCEDURE — 36415 COLL VENOUS BLD VENIPUNCTURE: CPT

## 2021-04-12 PROCEDURE — 86334 IMMUNOFIX E-PHORESIS SERUM: CPT

## 2021-04-12 PROCEDURE — 85025 COMPLETE CBC W/AUTO DIFF WBC: CPT

## 2021-04-12 PROCEDURE — 80053 COMPREHEN METABOLIC PANEL: CPT

## 2021-04-12 PROCEDURE — 82784 ASSAY IGA/IGD/IGG/IGM EACH: CPT

## 2021-04-12 PROCEDURE — 82962 GLUCOSE BLOOD TEST: CPT

## 2021-04-15 ENCOUNTER — APPOINTMENT (OUTPATIENT)
Dept: HEMATOLOGY/ONCOLOGY | Facility: HOSPITAL | Age: 66
End: 2021-04-15
Attending: INTERNAL MEDICINE
Payer: COMMERCIAL

## 2021-04-19 ENCOUNTER — OFFICE VISIT (OUTPATIENT)
Dept: HEMATOLOGY/ONCOLOGY | Facility: HOSPITAL | Age: 66
End: 2021-04-19
Attending: INTERNAL MEDICINE
Payer: COMMERCIAL

## 2021-04-19 VITALS
SYSTOLIC BLOOD PRESSURE: 112 MMHG | TEMPERATURE: 98 F | HEIGHT: 66.69 IN | OXYGEN SATURATION: 97 % | RESPIRATION RATE: 18 BRPM | WEIGHT: 143.38 LBS | DIASTOLIC BLOOD PRESSURE: 76 MMHG | BODY MASS INDEX: 22.77 KG/M2 | HEART RATE: 85 BPM

## 2021-04-19 DIAGNOSIS — D47.2 MONOCLONAL GAMMOPATHY ASSOCIATED WITH PLASMA CELL DYSCRASIA: ICD-10-CM

## 2021-04-19 DIAGNOSIS — D47.2 SMOLDERING MYELOMA: Primary | ICD-10-CM

## 2021-04-19 PROCEDURE — 99214 OFFICE O/P EST MOD 30 MIN: CPT | Performed by: INTERNAL MEDICINE

## 2021-04-19 NOTE — PROGRESS NOTES
Hematology/Oncology Clinic Follow Up Visit    Patient Name: Navjot Mcclendon  Medical Record Number: [id number]    YOB: 1955   PCP: Dr. Alvino Alcazar  Other providers: Dr. Joann Murphy (liver), Dr. Markell Weber (neuro), Dr. Sarah Beth Contreras (rad intermittent pain. Pain started 2015 pain not severe. Pain starts to right cheek travel to the top of the head. It is a burning sharp sensation.   Pt had a dental procedure in 2015 to be fitted for new dentures and bitting down on cold mold she began to th Packs/day: 1.25        Years: 35.00        Pack years: 43.75        Types: Cigarettes        Quit date:         Years since quittin.3      Smokeless tobacco: Never Used    Vaping Use      Vaping Use: Never used    Alcohol use: Yes      Comment HGB 13.6 12/30/2020    HCT 42.1 04/12/2021    MCV 97.9 04/12/2021    MCH 31.6 04/12/2021    MCHC 32.3 04/12/2021    RDW 13.0 04/12/2021    .0 04/12/2021    .0 02/22/2021    .0 12/30/2020     Lab Results   Component Value Date    GLU 12 Component Value Date     07/11/2020     04/07/2020     Lab Results   Component Value Date    B2MICR 0.211 07/11/2020    B2MICR 0.205 04/07/2020     Imaging:    DEXA 1/30/19: Osteopenia with lumbar T score -1.9, total hip T score -1.7, femor

## 2021-04-23 ENCOUNTER — OFFICE VISIT (OUTPATIENT)
Dept: NEUROLOGY | Facility: CLINIC | Age: 66
End: 2021-04-23
Payer: COMMERCIAL

## 2021-04-23 VITALS
HEART RATE: 60 BPM | BODY MASS INDEX: 23 KG/M2 | DIASTOLIC BLOOD PRESSURE: 64 MMHG | SYSTOLIC BLOOD PRESSURE: 110 MMHG | RESPIRATION RATE: 14 BRPM | WEIGHT: 144.81 LBS

## 2021-04-23 DIAGNOSIS — G50.0 TRIGEMINAL NEURALGIA: Primary | ICD-10-CM

## 2021-04-23 PROCEDURE — 3078F DIAST BP <80 MM HG: CPT | Performed by: OTHER

## 2021-04-23 PROCEDURE — 3074F SYST BP LT 130 MM HG: CPT | Performed by: OTHER

## 2021-04-23 PROCEDURE — 99213 OFFICE O/P EST LOW 20 MIN: CPT | Performed by: OTHER

## 2021-04-23 NOTE — PROGRESS NOTES
Ryan 1827   Neurology-f/u    Maco Samuel Patient Status:  No patient class for patient encounter    1955 MRN [id number]   Location Africa Valentin MD              HPI: facial pain increased. She went up to 2700mg total daily, with persistent right facial pain. She was started on nortriptyline by Dr. Epifanio Tejada when I was on maternity leave, and now she is on 50mg. She was also referred for cyberknife evaluation.    Interim MCH      26.0 - 34.0 pg 30.4   MCHC      31.0 - 37.0 g/dL 31.4   RDW      11.0 - 15.0 % 13.4   RDW-SD      35.1 - 46.3 fL 48.1 (H)     Component      Latest Ref Rng & Units 11/15/2019   Glucose      70 - 99 mg/dL 84   Sodium      136 - 145 mmol/L 138   P grandfather, paternal grandmother, son, and son. Social History:   reports that she quit smoking about 11 years ago. Her smoking use included cigarettes. She has a 43.75 pack-year smoking history.  She has never used smokeless tobacco. She reports jenni joint deformities  Neurologic examination:  Mental status: Patient is alert, attentive, and oriented x 3. Language is coherent and fluent without aphasia. Memory, comprehension and ability to follow commands were intact.    Cranial nerves II-XII: Optic disc

## 2021-04-23 NOTE — PATIENT INSTRUCTIONS
Refill policies:    • Allow 2-3 business days for refills; controlled substances may take longer.   • Contact your pharmacy at least 5 days prior to running out of medication and have them send an electronic request or submit request through the “request re Depending on your insurance carrier, approval may take 3-10 days. It is highly recommended patients contact their insurance carrier directly to determine coverage.   If test is done without insurance authorization, patient may be responsible for the entire Private car

## 2021-05-24 ENCOUNTER — LAB ENCOUNTER (OUTPATIENT)
Dept: LAB | Age: 66
End: 2021-05-24
Attending: INTERNAL MEDICINE
Payer: COMMERCIAL

## 2021-05-24 DIAGNOSIS — R94.4 DECREASED CALCULATED GLOMERULAR FILTRATION RATE (GFR): ICD-10-CM

## 2021-05-24 DIAGNOSIS — E03.8 SUBCLINICAL HYPOTHYROIDISM: ICD-10-CM

## 2021-05-24 DIAGNOSIS — R79.89 ELEVATED LIVER FUNCTION TESTS: Primary | ICD-10-CM

## 2021-05-24 DIAGNOSIS — R79.9 ELEVATED BUN: ICD-10-CM

## 2021-05-24 DIAGNOSIS — R79.89 ABNORMAL THYROID BLOOD TEST: ICD-10-CM

## 2021-05-24 PROCEDURE — 84439 ASSAY OF FREE THYROXINE: CPT

## 2021-05-24 PROCEDURE — 84443 ASSAY THYROID STIM HORMONE: CPT

## 2021-05-24 PROCEDURE — 36415 COLL VENOUS BLD VENIPUNCTURE: CPT

## 2021-05-24 PROCEDURE — 80048 BASIC METABOLIC PNL TOTAL CA: CPT

## 2021-06-03 ENCOUNTER — PATIENT MESSAGE (OUTPATIENT)
Dept: NEUROLOGY | Facility: CLINIC | Age: 66
End: 2021-06-03

## 2021-06-03 DIAGNOSIS — G50.0 TRIGEMINAL NEURALGIA: Primary | ICD-10-CM

## 2021-06-03 NOTE — TELEPHONE ENCOUNTER
From: Nataly Osborne  To: Nadine Portillo DO  Sent: 6/3/2021 10:00 AM CDT  Subject: Non-Urgent Medical Question    Good morning Dr. Reese Gold - Well, here i am again - in pain needing help. It never ends.  I've been on the same dose since March 10th b

## 2021-06-14 ENCOUNTER — PATIENT MESSAGE (OUTPATIENT)
Dept: INTERNAL MEDICINE CLINIC | Facility: CLINIC | Age: 66
End: 2021-06-14

## 2021-06-14 DIAGNOSIS — E03.8 SUBCLINICAL HYPOTHYROIDISM: ICD-10-CM

## 2021-06-14 DIAGNOSIS — R79.89 ABNORMAL THYROID BLOOD TEST: ICD-10-CM

## 2021-06-14 RX ORDER — LEVOTHYROXINE SODIUM 0.03 MG/1
25 TABLET ORAL
Qty: 90 TABLET | Refills: 0 | Status: SHIPPED | OUTPATIENT
Start: 2021-06-14 | End: 2021-10-28

## 2021-06-14 NOTE — TELEPHONE ENCOUNTER
From: Malian Pages  To: Jennifer Lin MD  Sent: 6/14/2021 12:56 PM CDT  Subject: Prescription Question    I am running low on Simvastatin Tab 20MG. I am only taking them every other day now or 4X per week. Let me know if I need to do anything else.

## 2021-06-14 NOTE — TELEPHONE ENCOUNTER
LOV 3/19/21    Pt requesting simvastatin fill. Please see his message, we have not filled before. If agreeable, should we write to be taken 4 times weekly? Please advise.

## 2021-06-14 NOTE — TELEPHONE ENCOUNTER
PASSED per protocol, refill sent.   Last PE 2.19.21  Future Appointments   Date Time Provider Christy Kenyon   6/29/2021  5:00 PM Jude Meneses, JUDY [id number] [place] [id number] [facility]   10/18/2021  9:30 AM Quinn Roman MD [address] [place] [address]

## 2021-06-15 ENCOUNTER — TELEPHONE (OUTPATIENT)
Dept: INTERNAL MEDICINE CLINIC | Facility: CLINIC | Age: 66
End: 2021-06-15

## 2021-06-15 RX ORDER — SIMVASTATIN 20 MG
20 TABLET ORAL NIGHTLY
Qty: 90 TABLET | Refills: 3 | Status: SHIPPED | OUTPATIENT
Start: 2021-06-15 | End: 2021-10-20

## 2021-06-15 NOTE — TELEPHONE ENCOUNTER
Pharm needs to clarify directions vs quantity for this rx simvastatin 20 MG Oral Tab-please call to discuss

## 2021-06-16 NOTE — TELEPHONE ENCOUNTER
Called pt to confirm she is taking every other day, ok to call pharmacy and change to #45 instead of #90 for 90 day? Please advise.

## 2021-08-24 ENCOUNTER — LAB ENCOUNTER (OUTPATIENT)
Dept: LAB | Age: 66
End: 2021-08-24
Attending: NURSE PRACTITIONER
Payer: MEDICARE

## 2021-08-24 ENCOUNTER — OFFICE VISIT (OUTPATIENT)
Dept: NEUROLOGY | Facility: CLINIC | Age: 66
End: 2021-08-24
Payer: MEDICARE

## 2021-08-24 VITALS
SYSTOLIC BLOOD PRESSURE: 118 MMHG | WEIGHT: 148.19 LBS | RESPIRATION RATE: 16 BRPM | DIASTOLIC BLOOD PRESSURE: 70 MMHG | BODY MASS INDEX: 23 KG/M2 | HEART RATE: 68 BPM

## 2021-08-24 DIAGNOSIS — E03.8 SUBCLINICAL HYPOTHYROIDISM: ICD-10-CM

## 2021-08-24 DIAGNOSIS — R79.9 ELEVATED BUN: ICD-10-CM

## 2021-08-24 DIAGNOSIS — G50.0 TRIGEMINAL NEURALGIA: Primary | ICD-10-CM

## 2021-08-24 LAB
ANION GAP SERPL CALC-SCNC: 2 MMOL/L (ref 0–18)
BUN BLD-MCNC: 19 MG/DL (ref 7–18)
CALCIUM BLD-MCNC: 9.5 MG/DL (ref 8.5–10.1)
CHLORIDE SERPL-SCNC: 107 MMOL/L (ref 98–112)
CO2 SERPL-SCNC: 31 MMOL/L (ref 21–32)
CREAT BLD-MCNC: 0.95 MG/DL
GLUCOSE BLD-MCNC: 84 MG/DL (ref 70–99)
OSMOLALITY SERPL CALC.SUM OF ELEC: 291 MOSM/KG (ref 275–295)
PATIENT FASTING Y/N/NP: YES
POTASSIUM SERPL-SCNC: 4.3 MMOL/L (ref 3.5–5.1)
SODIUM SERPL-SCNC: 140 MMOL/L (ref 136–145)
T4 FREE SERPL-MCNC: 0.6 NG/DL (ref 0.8–1.7)
TSI SER-ACNC: 22.7 MIU/ML (ref 0.36–3.74)

## 2021-08-24 PROCEDURE — 99214 OFFICE O/P EST MOD 30 MIN: CPT | Performed by: OTHER

## 2021-08-24 PROCEDURE — 36415 COLL VENOUS BLD VENIPUNCTURE: CPT

## 2021-08-24 PROCEDURE — 84439 ASSAY OF FREE THYROXINE: CPT

## 2021-08-24 PROCEDURE — 80048 BASIC METABOLIC PNL TOTAL CA: CPT

## 2021-08-24 PROCEDURE — 84443 ASSAY THYROID STIM HORMONE: CPT

## 2021-08-24 RX ORDER — GABAPENTIN 300 MG/1
CAPSULE ORAL
Qty: 360 CAPSULE | Refills: 1 | Status: SHIPPED | OUTPATIENT
Start: 2021-08-24 | End: 2022-01-26

## 2021-08-24 NOTE — PROGRESS NOTES
Ryan 1827   Neurology-f/u    Aashish Schwarz Patient Status:  No patient class for patient encounter    1955 MRN [id number]   [facility] PCP Eligio Caal MD              HPI: facial pain increased. She went up to 2700mg total daily, with persistent right facial pain. She was started on nortriptyline by Dr. Sparkle Sexton when I was on maternity leave, and now she is on 50mg. She was also referred for cyberknife evaluation.    Interim 3.80 - 5.30 x10(6)uL 4.21   Hemoglobin      12.0 - 16.0 g/dL 12.8   Hematocrit      35.0 - 48.0 % 40.7   Platelet Count      451.8 - 450.0 10(3)uL 371.0   MCV      80.0 - 100.0 fL 96.7   MCH      26.0 - 34.0 pg 30.4   MCHC      31.0 - 37.0 g/dL 31.4   R acl surgery       Family History:  family history includes Cancer (age of onset: 76) in her mother; No Known Problems in her daughter, maternal grandfather, maternal grandmother, paternal grandfather, paternal grandmother, son, and son.     Social History: noted in the HPI.          PHYSICAL EXAM:   Neurologic Exam  Vitals   08/24/21  0816   BP: 118/70   Pulse: 68   Resp: 16     General Appearance: Patient is a 78year old female in no acute distress  Cardiac: Normal rate & regular rhythm  Lungs: Clear to [employer] gabapentin, when lowered to this dose, caused her severe breakthrough pain  Recommended to not decrease dose of gabapentin at this time, unless develops any renal dysfunction      Requested Prescriptions     Signed Prescriptions Disp Refills   • gabapentin

## 2021-10-11 ENCOUNTER — LAB ENCOUNTER (OUTPATIENT)
Dept: LAB | Age: 66
End: 2021-10-11
Attending: INTERNAL MEDICINE
Payer: MEDICARE

## 2021-10-11 DIAGNOSIS — D47.2 SMOLDERING MYELOMA: ICD-10-CM

## 2021-10-11 DIAGNOSIS — E03.9 HYPOTHYROIDISM, UNSPECIFIED TYPE: ICD-10-CM

## 2021-10-11 PROCEDURE — 82784 ASSAY IGA/IGD/IGG/IGM EACH: CPT

## 2021-10-11 PROCEDURE — 84165 PROTEIN E-PHORESIS SERUM: CPT

## 2021-10-11 PROCEDURE — 83883 ASSAY NEPHELOMETRY NOT SPEC: CPT

## 2021-10-11 PROCEDURE — 84443 ASSAY THYROID STIM HORMONE: CPT

## 2021-10-11 PROCEDURE — 86334 IMMUNOFIX E-PHORESIS SERUM: CPT

## 2021-10-11 PROCEDURE — 85025 COMPLETE CBC W/AUTO DIFF WBC: CPT

## 2021-10-11 PROCEDURE — 84439 ASSAY OF FREE THYROXINE: CPT

## 2021-10-11 PROCEDURE — 80053 COMPREHEN METABOLIC PANEL: CPT

## 2021-10-11 PROCEDURE — 36415 COLL VENOUS BLD VENIPUNCTURE: CPT

## 2021-10-12 DIAGNOSIS — E03.9 HYPOTHYROIDISM, UNSPECIFIED TYPE: Primary | ICD-10-CM

## 2021-10-13 ENCOUNTER — TELEPHONE (OUTPATIENT)
Dept: INTERNAL MEDICINE CLINIC | Facility: CLINIC | Age: 66
End: 2021-10-13

## 2021-10-13 NOTE — TELEPHONE ENCOUNTER
Prescription Refill Request - Patient advised can take 48-72 hours. Name of Medication (strength, dose, qty requested:   90 day   simvastatin 20 MG Oral Tab 90 tablet 3 6/15/2021     Sig - Route: Take 1 tablet (20 mg total) by mouth nightly.  Takes every

## 2021-10-18 ENCOUNTER — APPOINTMENT (OUTPATIENT)
Dept: HEMATOLOGY/ONCOLOGY | Facility: HOSPITAL | Age: 66
End: 2021-10-18
Attending: INTERNAL MEDICINE
Payer: MEDICARE

## 2021-10-20 RX ORDER — SIMVASTATIN 20 MG
20 TABLET ORAL NIGHTLY
Qty: 90 TABLET | Refills: 1 | Status: SHIPPED | OUTPATIENT
Start: 2021-10-20 | End: 2021-12-10

## 2021-10-28 ENCOUNTER — OFFICE VISIT (OUTPATIENT)
Dept: HEMATOLOGY/ONCOLOGY | Facility: HOSPITAL | Age: 66
End: 2021-10-28
Attending: INTERNAL MEDICINE
Payer: MEDICARE

## 2021-10-28 VITALS
SYSTOLIC BLOOD PRESSURE: 127 MMHG | RESPIRATION RATE: 16 BRPM | BODY MASS INDEX: 23.98 KG/M2 | TEMPERATURE: 98 F | HEART RATE: 82 BPM | DIASTOLIC BLOOD PRESSURE: 83 MMHG | OXYGEN SATURATION: 98 % | HEIGHT: 66.69 IN | WEIGHT: 151 LBS

## 2021-10-28 DIAGNOSIS — D47.2 MONOCLONAL GAMMOPATHY ASSOCIATED WITH PLASMA CELL DYSCRASIA: ICD-10-CM

## 2021-10-28 DIAGNOSIS — D47.2 SMOLDERING MYELOMA: Primary | ICD-10-CM

## 2021-10-28 DIAGNOSIS — G50.0 TRIGEMINAL NEURALGIA: ICD-10-CM

## 2021-10-28 PROCEDURE — 99215 OFFICE O/P EST HI 40 MIN: CPT | Performed by: INTERNAL MEDICINE

## 2021-10-28 NOTE — PROGRESS NOTES
Hematology/Oncology Clinic Follow Up Visit    Patient Name: Gabino Carter  Medical Record Number: [id number]    YOB: 1955   PCP: Dr. Yvonne Appiah  Other providers: Dr. Aniyah Raya (liver), Dr. Gricelda Helton (neuro), Dr. Padmini Contreras (rad History:   Diagnosis Date   • Hypercholesteremia    • Hyperlipidemia    • Smoldering myeloma ([facility] [address] 4/29/2020   • Trigeminal neuralgia 10/2019    Right side of face intermittent pain. Pain started 2015 pain not severe.  Pain starts to right cheek travel to t child, thinks its a rash    Psychosocial History:  Social History    Social History Narrative      . Has 3 adult children. 5 grandchildren who live locally.   Retired high school guidance counselor    Social History    Tobacco Use      Smoking stat hepatosplenomegaly  Neurological: Grossly intact   Lymphatics: No palpable lymphadenopathy  Skin: no rashes or petechiae    Laboratory:  Lab Results   Component Value Date    WBC 5.7 10/11/2021    WBC 5.8 04/12/2021    WBC 5.2 02/22/2021    HGB 12.5 10/11/ 8.55 (H) 10/11/2021    KAPLAMRATIO 4.85 (H) 04/12/2021    KAPLAMRATIO 4.54 (H) 12/30/2020    KAPLAMRATIO 6.05 (H) 10/01/2020    KAPLAMRATIO 3.86 (H) 07/11/2020    KAPLAMRATIO 1.2 04/21/2020    KAPLAMRATIO 4.91 (H) 03/05/2020     Lab Results   Component Ayla stable from prior.   No s/s to indicate any progression from smoldering myeloma at this time  -will continue to monitor:   -Repeat CBC, CMP, quantitative immunoglobulins, monoclonal protein study, and 24-hour urine collection for Bence-Steele proteins in 6 m

## 2021-11-19 ENCOUNTER — WALK IN (OUTPATIENT)
Dept: URGENT CARE | Age: 66
End: 2021-11-19

## 2021-11-19 VITALS
HEART RATE: 81 BPM | TEMPERATURE: 97.3 F | RESPIRATION RATE: 18 BRPM | DIASTOLIC BLOOD PRESSURE: 76 MMHG | HEIGHT: 67 IN | BODY MASS INDEX: 22.76 KG/M2 | WEIGHT: 145 LBS | OXYGEN SATURATION: 97 % | SYSTOLIC BLOOD PRESSURE: 122 MMHG

## 2021-11-19 DIAGNOSIS — J01.00 ACUTE NON-RECURRENT MAXILLARY SINUSITIS: Primary | ICD-10-CM

## 2021-11-19 PROCEDURE — 99213 OFFICE O/P EST LOW 20 MIN: CPT | Performed by: NURSE PRACTITIONER

## 2021-11-19 RX ORDER — SIMVASTATIN 20 MG
20 TABLET ORAL
COMMUNITY
Start: 2021-10-20

## 2021-11-19 RX ORDER — NORTRIPTYLINE HYDROCHLORIDE 10 MG/1
CAPSULE ORAL
COMMUNITY
Start: 2021-08-31

## 2021-11-19 RX ORDER — DOXYCYCLINE HYCLATE 100 MG/1
100 CAPSULE ORAL 2 TIMES DAILY
Qty: 20 CAPSULE | Refills: 0 | Status: SHIPPED | OUTPATIENT
Start: 2021-11-19 | End: 2021-11-29

## 2021-11-19 RX ORDER — GABAPENTIN 300 MG/1
CAPSULE ORAL
COMMUNITY
Start: 2021-11-09

## 2021-11-19 RX ORDER — LEVOTHYROXINE SODIUM 0.05 MG/1
50 TABLET ORAL
COMMUNITY
Start: 2021-08-26

## 2021-11-19 ASSESSMENT — ENCOUNTER SYMPTOMS
ADENOPATHY: 0
COUGH: 1
WHEEZING: 0
SHORTNESS OF BREATH: 0
DIAPHORESIS: 0
UNEXPECTED WEIGHT CHANGE: 0
SINUS PAIN: 1
ACTIVITY CHANGE: 0
CHILLS: 0
HEADACHES: 0
CHEST TIGHTNESS: 0
AGITATION: 0
SINUS PRESSURE: 1
FATIGUE: 1
SORE THROAT: 0
FEVER: 0

## 2021-11-23 ENCOUNTER — LAB ENCOUNTER (OUTPATIENT)
Dept: LAB | Age: 66
End: 2021-11-23
Attending: INTERNAL MEDICINE
Payer: MEDICARE

## 2021-11-23 DIAGNOSIS — E03.9 HYPOTHYROIDISM, UNSPECIFIED TYPE: Primary | ICD-10-CM

## 2021-11-23 DIAGNOSIS — E03.9 HYPOTHYROIDISM, UNSPECIFIED TYPE: ICD-10-CM

## 2021-11-23 PROCEDURE — 36415 COLL VENOUS BLD VENIPUNCTURE: CPT

## 2021-11-23 PROCEDURE — 84436 ASSAY OF TOTAL THYROXINE: CPT

## 2021-11-23 PROCEDURE — 84443 ASSAY THYROID STIM HORMONE: CPT

## 2021-11-23 RX ORDER — LEVOTHYROXINE SODIUM 0.07 MG/1
75 TABLET ORAL
Qty: 90 TABLET | Refills: 1 | Status: SHIPPED | OUTPATIENT
Start: 2021-11-23

## 2021-12-07 DIAGNOSIS — G50.0 TRIGEMINAL NEURALGIA: Primary | ICD-10-CM

## 2021-12-07 RX ORDER — GABAPENTIN 600 MG/1
TABLET ORAL
Qty: 270 TABLET | Refills: 1 | Status: SHIPPED | OUTPATIENT
Start: 2021-12-07

## 2021-12-07 NOTE — TELEPHONE ENCOUNTER
Medication: gabapentin 600 MG Oral Tab       Date of last refill: 03/11/2021 (#315/1)  [place]     Last office visit: 08/24/2021  Due back to clinic per last office note:  Around 05/24/2022  Date next office visit sc

## 2021-12-08 ENCOUNTER — PATIENT MESSAGE (OUTPATIENT)
Dept: INTERNAL MEDICINE CLINIC | Facility: CLINIC | Age: 66
End: 2021-12-08

## 2021-12-08 RX ORDER — SIMVASTATIN 20 MG
20 TABLET ORAL NIGHTLY
Qty: 90 TABLET | Refills: 1 | OUTPATIENT
Start: 2021-12-08

## 2021-12-10 RX ORDER — SIMVASTATIN 20 MG
20 TABLET ORAL NIGHTLY
Qty: 90 TABLET | Refills: 3 | Status: SHIPPED | OUTPATIENT
Start: 2021-12-10 | End: 2021-12-15

## 2021-12-10 NOTE — TELEPHONE ENCOUNTER
From: Kristian Mckeon  To: Barber Hirsch MD  Sent: 12/8/2021 8:27 AM CST  Subject: Simvastatin    Dr. Baljeet Heath - I will be out of town the entire month of January. I will run out of Simvastatin sometime in January.  Since I use a mail order service, Ca

## 2021-12-14 NOTE — TELEPHONE ENCOUNTER
Pt [facility] to take [facility] her while she is gone the month on January-she takes it 4x per week so will need at least #16 pills to take with her

## 2021-12-15 RX ORDER — SIMVASTATIN 20 MG
20 TABLET ORAL NIGHTLY
Qty: 30 TABLET | Refills: 0 | Status: SHIPPED | OUTPATIENT
Start: 2021-12-15 | End: 2022-01-21

## 2021-12-28 ENCOUNTER — TELEPHONE (OUTPATIENT)
Dept: SURGERY | Facility: CLINIC | Age: 66
End: 2021-12-28

## 2021-12-28 DIAGNOSIS — G44.209 TENSION HEADACHE: ICD-10-CM

## 2021-12-28 RX ORDER — NORTRIPTYLINE HYDROCHLORIDE 10 MG/1
CAPSULE ORAL
Qty: 180 CAPSULE | Refills: 3 | Status: SHIPPED | OUTPATIENT
Start: 2021-12-28

## 2021-12-28 NOTE — TELEPHONE ENCOUNTER
Pt states only has 15 pills left, but will be out of town until Feb 1st.Nortriptyline HCl 10 MG Oral Capsule , please call to discuss.

## 2021-12-28 NOTE — TELEPHONE ENCOUNTER
Medication: Nortriptyline HCl 10 MG Oral Cap     Date of last refill: 3/11/2021 (#180/1)  [place]     Last office visit: 8/24/2021  Due back to clinic per last office note:  9 months  Date next office visit scheduled

## 2022-01-21 RX ORDER — SIMVASTATIN 20 MG
TABLET ORAL
Qty: 15 TABLET | Refills: 0 | Status: SHIPPED | OUTPATIENT
Start: 2022-01-21

## 2022-01-21 NOTE — TELEPHONE ENCOUNTER
PASSED per protocol, refill sent.   Last PE 2.19.21  Future Appointments   Date Time Provider Christy Kenyon   4/28/2022 10:30 AM Peter Azar MD [facility]

## 2022-01-25 DIAGNOSIS — G50.0 TRIGEMINAL NEURALGIA: Primary | ICD-10-CM

## 2022-01-26 RX ORDER — GABAPENTIN 300 MG/1
CAPSULE ORAL
Qty: 360 CAPSULE | Refills: 1 | Status: SHIPPED | OUTPATIENT
Start: 2022-01-26

## 2022-01-26 NOTE — TELEPHONE ENCOUNTER
Medication: GABAPENTIN 300 MG Oral Cap     Date of last refill: 08/24/2021 (#360/1)  [place]     Last office visit: 08/24/2021  Due back to clinic per last office note:  Around 05/24/2022  Date next office visit sche

## 2022-02-01 ENCOUNTER — PATIENT MESSAGE (OUTPATIENT)
Dept: NEUROLOGY | Facility: CLINIC | Age: 67
End: 2022-02-01

## 2022-02-01 NOTE — TELEPHONE ENCOUNTER
From: Shu Messer  To: Sandra Alfonso DO  Sent: 2/1/2022 9:04 AM CST  Subject: pain    My pain is almost constant - can hardly eat/chew. I am increasing Nortriptyline to 40 mg. today. My vision has changed - blurry. I am seeing my optomologist on Friday - may be cataracts? I am hoping the additional Nortriptyline will help. I will, obviously, keep you posted. Thanks for listening.

## 2022-02-03 ENCOUNTER — LAB ENCOUNTER (OUTPATIENT)
Dept: LAB | Age: 67
End: 2022-02-03
Attending: INTERNAL MEDICINE
Payer: MEDICARE

## 2022-02-03 DIAGNOSIS — E03.9 HYPOTHYROIDISM, UNSPECIFIED TYPE: ICD-10-CM

## 2022-02-03 LAB
T4 FREE SERPL-MCNC: 1.2 NG/DL (ref 0.8–1.7)
TSI SER-ACNC: 2.65 MIU/ML (ref 0.36–3.74)

## 2022-02-03 PROCEDURE — 84439 ASSAY OF FREE THYROXINE: CPT

## 2022-02-03 PROCEDURE — 84443 ASSAY THYROID STIM HORMONE: CPT

## 2022-02-03 PROCEDURE — 36415 COLL VENOUS BLD VENIPUNCTURE: CPT

## 2022-02-04 RX ORDER — OXCARBAZEPINE 300 MG/1
TABLET, FILM COATED ORAL
Qty: 60 TABLET | Refills: 0 | Status: SHIPPED | OUTPATIENT
Start: 2022-02-04 | End: 2022-03-10

## 2022-02-04 NOTE — TELEPHONE ENCOUNTER
To: MATTHEW ROSA NURSE      From: Marna Councilman      Created: 2/4/2022 3:17 PM          Dr Travis Anderson - I am in the process of setting up an appointment at [facility]. They need a referral from you. I would greatly appreciate any help you can provide. Would you please contact [facility] at [phone] . Please let me know if you have any questions or concerns. Thank you.

## 2022-02-07 NOTE — TELEPHONE ENCOUNTER
[facility] referral faxed with fax confirmation received. [employer] message sent to patient.         Documentation      Kelsy Logan Nurse 3 days ago         Please assist with [facility] referral for trigeminal neuralgia

## 2022-03-09 ENCOUNTER — MED REC SCAN ONLY (OUTPATIENT)
Dept: INTERNAL MEDICINE CLINIC | Facility: CLINIC | Age: 67
End: 2022-03-09

## 2022-03-10 ENCOUNTER — PATIENT MESSAGE (OUTPATIENT)
Dept: NEUROLOGY | Facility: CLINIC | Age: 67
End: 2022-03-10

## 2022-03-10 ENCOUNTER — TELEPHONE (OUTPATIENT)
Dept: INTERNAL MEDICINE CLINIC | Facility: CLINIC | Age: 67
End: 2022-03-10

## 2022-03-10 RX ORDER — OXCARBAZEPINE 300 MG/1
TABLET, FILM COATED ORAL
Qty: 60 TABLET | Refills: 2 | Status: SHIPPED | OUTPATIENT
Start: 2022-03-10 | End: 2022-03-30

## 2022-03-10 NOTE — TELEPHONE ENCOUNTER
Medication: OXCARBAZEPINE 300 MG Oral Tab     Date of last refill: 02/04/2022 (#60/0)  [place]     Last office visit: 08/24/2021  Due back to clinic per last office note:  Around 05/24/2022  Date next office visit scheduled:    Future Appointments   Date Time Provider Christy Kenyon   3/16/2022  1:40 PM BK DAJA RM1 [facility] [address]   4/20/2022 10:00 AM [facility] PET DOSE RM1 [facility] PET [facility]   4/20/2022 11:15 AM [facility] PET SCANNER [facility] PET [facility] AT [facility]   4/28/2022 10:30 AM Donna Herrera MD [address]   5/23/2022 10:15 AM Florin Durand DO [facility]   6/8/2022 11:30 AM Cathi Wei MD [facility] [address] [facility]           Last OV note recommendation:    Right trigeminal neuralgia, s/p cyberknife  Would not recommend any aggressive manipulation of the neck  Discussed surgical option for trigeminal neuralgia, is seeking an opinion for this  Continue gabapentin 900/900/1200mg  Continue nortriptyline 20mg nightly  Note: in 2019/2020, 1500mg of gabapentin, when lowered to this dose, caused her severe breakthrough pain  Recommended to not decrease dose of gabapentin at this time, unless develops any renal dysfunction

## 2022-03-10 NOTE — TELEPHONE ENCOUNTER
Patient scheduled mammogram for 3/16/22 via [employer] but there is no order in the chart. Please place mammogram order.

## 2022-03-11 ENCOUNTER — PATIENT MESSAGE (OUTPATIENT)
Dept: NEUROLOGY | Facility: CLINIC | Age: 67
End: 2022-03-11

## 2022-03-11 ENCOUNTER — LAB ENCOUNTER (OUTPATIENT)
Dept: LAB | Age: 67
End: 2022-03-11
Attending: Other
Payer: MEDICARE

## 2022-03-11 DIAGNOSIS — G50.0 TRIGEMINAL NEURALGIA: ICD-10-CM

## 2022-03-11 LAB
ALBUMIN SERPL-MCNC: 3.5 G/DL (ref 3.4–5)
ALBUMIN/GLOB SERPL: 0.7 {RATIO} (ref 1–2)
ALP LIVER SERPL-CCNC: 90 U/L
ALT SERPL-CCNC: 23 U/L
ANION GAP SERPL CALC-SCNC: 3 MMOL/L (ref 0–18)
AST SERPL-CCNC: 24 U/L (ref 15–37)
BASOPHILS # BLD AUTO: 0.06 X10(3) UL (ref 0–0.2)
BASOPHILS NFR BLD AUTO: 1.1 %
BILIRUB SERPL-MCNC: 0.3 MG/DL (ref 0.1–2)
BUN BLD-MCNC: 25 MG/DL (ref 7–18)
CALCIUM BLD-MCNC: 9.7 MG/DL (ref 8.5–10.1)
CHLORIDE SERPL-SCNC: 106 MMOL/L (ref 98–112)
CO2 SERPL-SCNC: 30 MMOL/L (ref 21–32)
CREAT BLD-MCNC: 1 MG/DL
EOSINOPHIL # BLD AUTO: 0.13 X10(3) UL (ref 0–0.7)
EOSINOPHIL NFR BLD AUTO: 2.5 %
FASTING STATUS PATIENT QL REPORTED: NO
GLOBULIN PLAS-MCNC: 4.8 G/DL (ref 2.8–4.4)
GLUCOSE BLD-MCNC: 92 MG/DL (ref 70–99)
HCT VFR BLD AUTO: 41.3 %
HGB BLD-MCNC: 12.9 G/DL
IMM GRANULOCYTES # BLD AUTO: 0.04 X10(3) UL (ref 0–1)
IMM GRANULOCYTES NFR BLD: 0.8 %
LYMPHOCYTES # BLD AUTO: 2.15 X10(3) UL (ref 1–4)
LYMPHOCYTES NFR BLD AUTO: 41.2 %
MCH RBC QN AUTO: 30.4 PG (ref 26–34)
MCHC RBC AUTO-ENTMCNC: 31.2 G/DL (ref 31–37)
MCV RBC AUTO: 97.2 FL
MONOCYTES # BLD AUTO: 0.5 X10(3) UL (ref 0.1–1)
MONOCYTES NFR BLD AUTO: 9.6 %
NEUTROPHILS # BLD AUTO: 2.34 X10 (3) UL (ref 1.5–7.7)
NEUTROPHILS # BLD AUTO: 2.34 X10(3) UL (ref 1.5–7.7)
NEUTROPHILS NFR BLD AUTO: 44.8 %
OSMOLALITY SERPL CALC.SUM OF ELEC: 292 MOSM/KG (ref 275–295)
PLATELET # BLD AUTO: 343 10(3)UL (ref 150–450)
POTASSIUM SERPL-SCNC: 5.1 MMOL/L (ref 3.5–5.1)
PROT SERPL-MCNC: 8.3 G/DL (ref 6.4–8.2)
RBC # BLD AUTO: 4.25 X10(6)UL
SODIUM SERPL-SCNC: 139 MMOL/L (ref 136–145)
WBC # BLD AUTO: 5.2 X10(3) UL (ref 4–11)

## 2022-03-11 PROCEDURE — 85025 COMPLETE CBC W/AUTO DIFF WBC: CPT

## 2022-03-11 PROCEDURE — 36415 COLL VENOUS BLD VENIPUNCTURE: CPT

## 2022-03-11 PROCEDURE — 80053 COMPREHEN METABOLIC PANEL: CPT

## 2022-03-11 NOTE — TELEPHONE ENCOUNTER
From: Boris Borrero  Sent: 3/11/2022 8:11 AM CST  To: Rosette Dooley Nurse  Subject: labs    Is it a fasting test?

## 2022-03-16 ENCOUNTER — HOSPITAL ENCOUNTER (OUTPATIENT)
Dept: MAMMOGRAPHY | Age: 67
Discharge: HOME | End: 2022-03-16
Attending: INTERNAL MEDICINE
Payer: MEDICARE

## 2022-03-16 DIAGNOSIS — Z12.31 ENCOUNTER FOR SCREENING MAMMOGRAM FOR MALIGNANT NEOPLASM OF BREAST: ICD-10-CM

## 2022-03-16 PROCEDURE — 77063 BREAST TOMOSYNTHESIS BI: CPT | Performed by: INTERNAL MEDICINE

## 2022-03-16 PROCEDURE — 77067 SCR MAMMO BI INCL CAD: CPT | Performed by: INTERNAL MEDICINE

## 2022-03-29 NOTE — PROGRESS NOTES
Comparison to outside films was done and images are stable. Radiology recommends follow up mammogram in 12 months (routine screening).

## 2022-03-30 ENCOUNTER — TELEPHONE (OUTPATIENT)
Dept: INTERNAL MEDICINE CLINIC | Facility: CLINIC | Age: 67
End: 2022-03-30

## 2022-03-30 NOTE — TELEPHONE ENCOUNTER
Pt stated she know she has uterine prolapse and wants a referral.  Pt stated she doesn't have a gyne.   Pt wants a call back as soon as possible

## 2022-03-30 NOTE — TELEPHONE ENCOUNTER
LLOYD AS and AD. Thanks AD! Called Dr. Ashok Sexton office. No soon availability for that provider, but pt is able to see TASIA Queen in that office today at 2:30pm.     Address:  [address] [place] [address], [address]  [place], [place]    Scheduled appt and informed patient. Agreeable and thankful.

## 2022-03-30 NOTE — TELEPHONE ENCOUNTER
LOV 3/19/21    Pt requesting referral for gyne for (possible) uterine prolapse. Pt has not been diagnosed, she states that's what she thinks it is. Reports \"I can see an organ in my vagina\". Noticed 2 days ago. Denies pain. Has some difficulty voiding- urine and stool; states it's more difficult to void. Asking for gyne referral.     Should we see her first so she can be seen soon? Only sees AS per chart, will have to see partner. Has smoldering myeloma.      Future Appointments   Date Time Provider Christy Kenyon   4/20/2022 10:00 AM [facility] PET DOSE RM[address]   4/20/2022 11:15 AM [facility] PET SCANNER [address] 4/28/2022 10:30 AM Eboni Garvin MD [address] [employer]   5/23/2022 10:15 AM Merlin Wright DO EN[facility] [place]   6/8/2022 11:30 AM Ab Richardson MD [facility] [address] [facility]

## 2022-03-30 NOTE — TELEPHONE ENCOUNTER
Discussed with AD. Should see Urogyne. Called Fernanda Cummings. First avail appt is April 27th. AD reports to try Dr. Katharina Cuenca instead. Needs to see someone to start work up.

## 2022-04-05 ENCOUNTER — TELEPHONE (OUTPATIENT)
Dept: INTERNAL MEDICINE CLINIC | Facility: CLINIC | Age: 67
End: 2022-04-05

## 2022-04-05 NOTE — TELEPHONE ENCOUNTER
Pt is scheduled for pre-op   Future Appointments   Date Time Provider Christy Kenyon   4/22/2022  9:00 AM Jose Tran MD [facility]     Pt is having procedure with Dr. Khushi Middleton at [facility] on 05/16/22    Faxed our pre-op form to [phone] and received confirmation     Placed in brown folder

## 2022-04-13 NOTE — TELEPHONE ENCOUNTER
Called [facility] & spoke with Surjit Brito. Surjit Brito confirmed this patient does have a schedule refill on file.  Surjit Brito confirmed we may disregard refill request.   Refused Medication: gabapentin 300 MG Oral Cap

## 2022-04-15 RX ORDER — GABAPENTIN 300 MG/1
CAPSULE ORAL
Qty: 360 CAPSULE | Refills: 1 | OUTPATIENT
Start: 2022-04-15

## 2022-04-19 ENCOUNTER — LAB ENCOUNTER (OUTPATIENT)
Dept: LAB | Age: 67
End: 2022-04-19
Attending: INTERNAL MEDICINE
Payer: MEDICARE

## 2022-04-19 DIAGNOSIS — D47.2 SMOLDERING MYELOMA: ICD-10-CM

## 2022-04-19 PROCEDURE — 84156 ASSAY OF PROTEIN URINE: CPT

## 2022-04-19 PROCEDURE — 86335 IMMUNFIX E-PHORSIS/URINE/CSF: CPT

## 2022-04-19 PROCEDURE — 84166 PROTEIN E-PHORESIS/URINE/CSF: CPT

## 2022-04-20 ENCOUNTER — HOSPITAL ENCOUNTER (OUTPATIENT)
Dept: NUCLEAR MEDICINE | Facility: HOSPITAL | Age: 67
Discharge: HOME | End: 2022-04-20
Attending: INTERNAL MEDICINE
Payer: MEDICARE

## 2022-04-20 ENCOUNTER — LAB ENCOUNTER (OUTPATIENT)
Dept: LAB | Age: 67
End: 2022-04-20
Attending: INTERNAL MEDICINE
Payer: MEDICARE

## 2022-04-20 DIAGNOSIS — D47.2 SMOLDERING MYELOMA: ICD-10-CM

## 2022-04-20 LAB
ALBUMIN SERPL-MCNC: 3.4 G/DL (ref 3.4–5)
ALBUMIN/GLOB SERPL: 0.7 {RATIO} (ref 1–2)
ALP LIVER SERPL-CCNC: 88 U/L
ALT SERPL-CCNC: 27 U/L
ANION GAP SERPL CALC-SCNC: 4 MMOL/L (ref 0–18)
AST SERPL-CCNC: 25 U/L (ref 15–37)
B2 MICROGLOB SERPL-MCNC: 0.2 MG/DL (ref 0.11–0.25)
BASOPHILS # BLD AUTO: 0.05 X10(3) UL (ref 0–0.2)
BASOPHILS NFR BLD AUTO: 0.8 %
BILIRUB SERPL-MCNC: 0.3 MG/DL (ref 0.1–2)
BUN BLD-MCNC: 23 MG/DL (ref 7–18)
CALCIUM BLD-MCNC: 9.6 MG/DL (ref 8.5–10.1)
CHLORIDE SERPL-SCNC: 106 MMOL/L (ref 98–112)
CO2 SERPL-SCNC: 31 MMOL/L (ref 21–32)
CREAT BLD-MCNC: 0.95 MG/DL
EOSINOPHIL # BLD AUTO: 0.12 X10(3) UL (ref 0–0.7)
EOSINOPHIL NFR BLD AUTO: 1.8 %
ERYTHROCYTE [DISTWIDTH] IN BLOOD BY AUTOMATED COUNT: 12.6 %
FASTING STATUS PATIENT QL REPORTED: NO
GLOBULIN PLAS-MCNC: 4.9 G/DL (ref 2.8–4.4)
GLUCOSE BLD-MCNC: 127 MG/DL (ref 70–99)
GLUCOSE BLD-MCNC: 95 MG/DL (ref 70–99)
HCT VFR BLD AUTO: 41.6 %
HGB BLD-MCNC: 12.8 G/DL
IGA SERPL-MCNC: 236 MG/DL (ref 70–312)
IGM SERPL-MCNC: 124 MG/DL (ref 43–279)
IMM GRANULOCYTES # BLD AUTO: 0.03 X10(3) UL (ref 0–1)
IMM GRANULOCYTES NFR BLD: 0.5 %
IMMUNOGLOBULIN PNL SER-MCNC: 2270 MG/DL (ref 791–1643)
LDH SERPL L TO P-CCNC: 168 U/L
LYMPHOCYTES # BLD AUTO: 2.15 X10(3) UL (ref 1–4)
LYMPHOCYTES NFR BLD AUTO: 32.5 %
M PROTEIN 24H UR ELPH-MRATE: 235 MG/24 HR (ref ?–149.1)
MCH RBC QN AUTO: 30.8 PG (ref 26–34)
MCHC RBC AUTO-ENTMCNC: 30.8 G/DL (ref 31–37)
MCV RBC AUTO: 100 FL
MONOCYTES # BLD AUTO: 0.4 X10(3) UL (ref 0.1–1)
MONOCYTES NFR BLD AUTO: 6 %
NEUTROPHILS # BLD AUTO: 3.87 X10 (3) UL (ref 1.5–7.7)
NEUTROPHILS # BLD AUTO: 3.87 X10(3) UL (ref 1.5–7.7)
NEUTROPHILS NFR BLD AUTO: 58.4 %
OSMOLALITY SERPL CALC.SUM OF ELEC: 297 MOSM/KG (ref 275–295)
PLATELET # BLD AUTO: 332 10(3)UL (ref 150–450)
POTASSIUM SERPL-SCNC: 4.2 MMOL/L (ref 3.5–5.1)
PROT SERPL-MCNC: 8.3 G/DL (ref 6.4–8.2)
RBC # BLD AUTO: 4.16 X10(6)UL
SODIUM SERPL-SCNC: 141 MMOL/L (ref 136–145)
SPECIMEN VOL UR: 2350 ML
WBC # BLD AUTO: 6.6 X10(3) UL (ref 4–11)

## 2022-04-20 PROCEDURE — 84165 PROTEIN E-PHORESIS SERUM: CPT

## 2022-04-20 PROCEDURE — 80053 COMPREHEN METABOLIC PANEL: CPT

## 2022-04-20 PROCEDURE — 83521 IG LIGHT CHAINS FREE EACH: CPT

## 2022-04-20 PROCEDURE — 86334 IMMUNOFIX E-PHORESIS SERUM: CPT

## 2022-04-20 PROCEDURE — 82784 ASSAY IGA/IGD/IGG/IGM EACH: CPT

## 2022-04-20 PROCEDURE — 82962 GLUCOSE BLOOD TEST: CPT

## 2022-04-20 PROCEDURE — 85025 COMPLETE CBC W/AUTO DIFF WBC: CPT

## 2022-04-20 PROCEDURE — 82232 ASSAY OF BETA-2 PROTEIN: CPT

## 2022-04-20 PROCEDURE — 36415 COLL VENOUS BLD VENIPUNCTURE: CPT

## 2022-04-20 PROCEDURE — 78816 PET IMAGE W/CT FULL BODY: CPT | Performed by: INTERNAL MEDICINE

## 2022-04-20 PROCEDURE — 83615 LACTATE (LD) (LDH) ENZYME: CPT

## 2022-04-20 RX ORDER — GABAPENTIN 600 MG/1
TABLET ORAL
Qty: 270 TABLET | Refills: 1 | Status: SHIPPED | OUTPATIENT
Start: 2022-04-20

## 2022-04-22 ENCOUNTER — LAB ENCOUNTER (OUTPATIENT)
Dept: LAB | Age: 67
End: 2022-04-22
Attending: FAMILY MEDICINE
Payer: MEDICARE

## 2022-04-22 ENCOUNTER — OFFICE VISIT (OUTPATIENT)
Dept: INTERNAL MEDICINE CLINIC | Facility: CLINIC | Age: 67
End: 2022-04-22
Payer: MEDICARE

## 2022-04-22 VITALS
BODY MASS INDEX: 24.68 KG/M2 | HEIGHT: 66.69 IN | SYSTOLIC BLOOD PRESSURE: 118 MMHG | RESPIRATION RATE: 18 BRPM | HEART RATE: 93 BPM | WEIGHT: 155.38 LBS | DIASTOLIC BLOOD PRESSURE: 80 MMHG

## 2022-04-22 DIAGNOSIS — E03.9 HYPOTHYROIDISM, UNSPECIFIED TYPE: ICD-10-CM

## 2022-04-22 DIAGNOSIS — Z01.818 PREOPERATIVE EXAMINATION: Primary | ICD-10-CM

## 2022-04-22 DIAGNOSIS — G50.0 TRIGEMINAL NEURALGIA: ICD-10-CM

## 2022-04-22 DIAGNOSIS — Z51.89 ENCOUNTER FOR OTHER SPECIFIED AFTERCARE: ICD-10-CM

## 2022-04-22 DIAGNOSIS — D47.2 SMOLDERING MYELOMA: ICD-10-CM

## 2022-04-22 DIAGNOSIS — Z01.818 PREOPERATIVE EXAMINATION: ICD-10-CM

## 2022-04-22 DIAGNOSIS — E78.00 HIGH CHOLESTEROL: ICD-10-CM

## 2022-04-22 LAB
ALBUMIN SERPL ELPH-MCNC: 4.12 G/DL (ref 3.75–5.21)
ALBUMIN/GLOB SERPL: 1.01 {RATIO} (ref 1–2)
ALPHA1 GLOB SERPL ELPH-MCNC: 0.33 G/DL (ref 0.19–0.46)
ALPHA2 GLOB SERPL ELPH-MCNC: 0.75 G/DL (ref 0.48–1.05)
APTT PPP: 30.5 SECONDS (ref 23.3–35.6)
B-GLOBULIN SERPL ELPH-MCNC: 0.88 G/DL (ref 0.68–1.23)
BILIRUB UR QL STRIP.AUTO: NEGATIVE
CLARITY UR REFRACT.AUTO: CLEAR
COLOR UR AUTO: YELLOW
GAMMA GLOB SERPL ELPH-MCNC: 2.13 G/DL (ref 0.62–1.7)
GLUCOSE UR STRIP.AUTO-MCNC: NEGATIVE MG/DL
INR BLD: 0.94 (ref 0.8–1.2)
KAPPA LC FREE SER-MCNC: 11.21 MG/DL (ref 0.33–1.94)
KAPPA LC FREE/LAMBDA FREE SER NEPH: 7.18 {RATIO} (ref 0.26–1.65)
KETONES UR STRIP.AUTO-MCNC: NEGATIVE MG/DL
LAMBDA LC FREE SERPL-MCNC: 1.56 MG/DL (ref 0.57–2.63)
M PROTEIN 1 SERPL ELPH-MCNC: 1.61 G/DL (ref ?–0)
NITRITE UR QL STRIP.AUTO: NEGATIVE
PH UR STRIP.AUTO: 6 [PH] (ref 5–8)
PROT SERPL-MCNC: 8.2 G/DL (ref 6.4–8.2)
PROT UR STRIP.AUTO-MCNC: NEGATIVE MG/DL
PROTHROMBIN TIME: 12.5 SECONDS (ref 11.6–14.8)
RBC UR QL AUTO: NEGATIVE
SP GR UR STRIP.AUTO: 1.01 (ref 1–1.03)
TSI SER-ACNC: 0.94 MIU/ML (ref 0.36–3.74)
UROBILINOGEN UR STRIP.AUTO-MCNC: <2 MG/DL

## 2022-04-22 PROCEDURE — 87086 URINE CULTURE/COLONY COUNT: CPT

## 2022-04-22 PROCEDURE — 93000 ELECTROCARDIOGRAM COMPLETE: CPT | Performed by: FAMILY MEDICINE

## 2022-04-22 PROCEDURE — 81001 URINALYSIS AUTO W/SCOPE: CPT

## 2022-04-22 PROCEDURE — 36415 COLL VENOUS BLD VENIPUNCTURE: CPT

## 2022-04-22 PROCEDURE — 99214 OFFICE O/P EST MOD 30 MIN: CPT | Performed by: FAMILY MEDICINE

## 2022-04-22 PROCEDURE — 84443 ASSAY THYROID STIM HORMONE: CPT

## 2022-04-22 PROCEDURE — 85610 PROTHROMBIN TIME: CPT

## 2022-04-22 PROCEDURE — 85730 THROMBOPLASTIN TIME PARTIAL: CPT

## 2022-04-28 ENCOUNTER — OFFICE VISIT (OUTPATIENT)
Dept: HEMATOLOGY/ONCOLOGY | Facility: HOSPITAL | Age: 67
End: 2022-04-28
Attending: INTERNAL MEDICINE
Payer: MEDICARE

## 2022-04-28 VITALS
SYSTOLIC BLOOD PRESSURE: 130 MMHG | RESPIRATION RATE: 16 BRPM | HEART RATE: 92 BPM | BODY MASS INDEX: 24.88 KG/M2 | TEMPERATURE: 97 F | WEIGHT: 156.63 LBS | HEIGHT: 66.69 IN | DIASTOLIC BLOOD PRESSURE: 82 MMHG | OXYGEN SATURATION: 97 %

## 2022-04-28 DIAGNOSIS — D47.2 SMOLDERING MYELOMA: Primary | ICD-10-CM

## 2022-04-28 DIAGNOSIS — G50.0 TRIGEMINAL NEURALGIA: ICD-10-CM

## 2022-04-28 DIAGNOSIS — D47.2 MONOCLONAL GAMMOPATHY ASSOCIATED WITH PLASMA CELL DYSCRASIA: ICD-10-CM

## 2022-04-28 PROCEDURE — 99214 OFFICE O/P EST MOD 30 MIN: CPT | Performed by: INTERNAL MEDICINE

## 2022-04-28 RX ORDER — LEVOTHYROXINE SODIUM 0.07 MG/1
TABLET ORAL
Qty: 90 TABLET | Refills: 1 | Status: SHIPPED | OUTPATIENT
Start: 2022-04-28

## 2022-05-12 ENCOUNTER — TELEPHONE (OUTPATIENT)
Dept: INTERNAL MEDICINE CLINIC | Facility: CLINIC | Age: 67
End: 2022-05-12

## 2022-05-12 NOTE — TELEPHONE ENCOUNTER
Dr. Gino Field for copy of EKG done at Pre op visit with Dr. Adrian Vogel on 4/22/22.     Fax [phone]

## 2022-05-23 ENCOUNTER — OFFICE VISIT (OUTPATIENT)
Dept: NEUROLOGY | Facility: CLINIC | Age: 67
End: 2022-05-23
Payer: MEDICARE

## 2022-05-23 VITALS
RESPIRATION RATE: 16 BRPM | DIASTOLIC BLOOD PRESSURE: 72 MMHG | SYSTOLIC BLOOD PRESSURE: 110 MMHG | HEART RATE: 82 BPM | BODY MASS INDEX: 24.62 KG/M2 | HEIGHT: 66.69 IN | WEIGHT: 155 LBS

## 2022-05-23 DIAGNOSIS — G50.0 TRIGEMINAL NEURALGIA: Primary | ICD-10-CM

## 2022-05-23 PROCEDURE — 99213 OFFICE O/P EST LOW 20 MIN: CPT | Performed by: OTHER

## 2022-05-23 RX ORDER — GABAPENTIN 300 MG/1
CAPSULE ORAL
Qty: 540 CAPSULE | Refills: 1 | Status: SHIPPED | OUTPATIENT
Start: 2022-05-23

## 2022-06-08 ENCOUNTER — OFFICE VISIT (OUTPATIENT)
Dept: INTERNAL MEDICINE CLINIC | Facility: CLINIC | Age: 67
End: 2022-06-08
Payer: MEDICARE

## 2022-06-08 VITALS
BODY MASS INDEX: 24.17 KG/M2 | RESPIRATION RATE: 20 BRPM | WEIGHT: 154 LBS | SYSTOLIC BLOOD PRESSURE: 118 MMHG | TEMPERATURE: 98 F | OXYGEN SATURATION: 97 % | DIASTOLIC BLOOD PRESSURE: 66 MMHG | HEART RATE: 78 BPM | HEIGHT: 67 IN

## 2022-06-08 DIAGNOSIS — G50.0 TRIGEMINAL NEURALGIA: ICD-10-CM

## 2022-06-08 DIAGNOSIS — R79.89 ELEVATED LIVER FUNCTION TESTS: ICD-10-CM

## 2022-06-08 DIAGNOSIS — Z00.00 ENCOUNTER FOR ANNUAL HEALTH EXAMINATION: Primary | ICD-10-CM

## 2022-06-08 DIAGNOSIS — K80.20 CALCULUS OF GALLBLADDER WITHOUT CHOLECYSTITIS WITHOUT OBSTRUCTION: ICD-10-CM

## 2022-06-08 DIAGNOSIS — Z12.11 SCREENING FOR MALIGNANT NEOPLASM OF COLON: ICD-10-CM

## 2022-06-08 DIAGNOSIS — F41.8 ANXIETY ABOUT HEALTH: ICD-10-CM

## 2022-06-08 DIAGNOSIS — Z96.0 PRESENCE OF PESSARY: ICD-10-CM

## 2022-06-08 DIAGNOSIS — Z12.11 SCREEN FOR COLON CANCER: ICD-10-CM

## 2022-06-08 DIAGNOSIS — D47.2 MONOCLONAL GAMMOPATHY: ICD-10-CM

## 2022-06-08 DIAGNOSIS — D47.2 SMOLDERING MYELOMA: ICD-10-CM

## 2022-06-08 DIAGNOSIS — E03.9 HYPOTHYROIDISM, UNSPECIFIED TYPE: ICD-10-CM

## 2022-06-08 DIAGNOSIS — K76.0 FATTY LIVER: ICD-10-CM

## 2022-06-08 DIAGNOSIS — E78.00 HIGH CHOLESTEROL: ICD-10-CM

## 2022-06-08 PROCEDURE — G0439 PPPS, SUBSEQ VISIT: HCPCS | Performed by: INTERNAL MEDICINE

## 2022-06-08 PROCEDURE — 99213 OFFICE O/P EST LOW 20 MIN: CPT | Performed by: INTERNAL MEDICINE

## 2022-06-08 RX ORDER — GABAPENTIN 300 MG/1
300 CAPSULE ORAL 3 TIMES DAILY
COMMUNITY

## 2022-06-09 ENCOUNTER — LAB ENCOUNTER (OUTPATIENT)
Dept: LAB | Age: 67
End: 2022-06-09
Attending: INTERNAL MEDICINE
Payer: MEDICARE

## 2022-06-09 DIAGNOSIS — D47.2 SMOLDERING MYELOMA: ICD-10-CM

## 2022-06-09 DIAGNOSIS — E78.00 HIGH CHOLESTEROL: ICD-10-CM

## 2022-06-09 LAB
ALBUMIN SERPL-MCNC: 3.5 G/DL (ref 3.4–5)
ALBUMIN/GLOB SERPL: 0.7 {RATIO} (ref 1–2)
ALP LIVER SERPL-CCNC: 95 U/L
ALT SERPL-CCNC: 24 U/L
ANION GAP SERPL CALC-SCNC: 3 MMOL/L (ref 0–18)
AST SERPL-CCNC: 21 U/L (ref 15–37)
BASOPHILS # BLD AUTO: 0.08 X10(3) UL (ref 0–0.2)
BASOPHILS NFR BLD AUTO: 1.3 %
BILIRUB SERPL-MCNC: 0.4 MG/DL (ref 0.1–2)
BUN BLD-MCNC: 23 MG/DL (ref 7–18)
CALCIUM BLD-MCNC: 9.8 MG/DL (ref 8.5–10.1)
CHLORIDE SERPL-SCNC: 107 MMOL/L (ref 98–112)
CHOLEST SERPL-MCNC: 241 MG/DL (ref ?–200)
CO2 SERPL-SCNC: 27 MMOL/L (ref 21–32)
CREAT BLD-MCNC: 0.98 MG/DL
EOSINOPHIL # BLD AUTO: 0.18 X10(3) UL (ref 0–0.7)
EOSINOPHIL NFR BLD AUTO: 2.9 %
ERYTHROCYTE [DISTWIDTH] IN BLOOD BY AUTOMATED COUNT: 12.8 %
FASTING PATIENT LIPID ANSWER: YES
FASTING STATUS PATIENT QL REPORTED: YES
GLOBULIN PLAS-MCNC: 5.3 G/DL (ref 2.8–4.4)
GLUCOSE BLD-MCNC: 93 MG/DL (ref 70–99)
HCT VFR BLD AUTO: 42.4 %
HDLC SERPL-MCNC: 60 MG/DL (ref 40–59)
HGB BLD-MCNC: 13.3 G/DL
IMM GRANULOCYTES # BLD AUTO: 0.02 X10(3) UL (ref 0–1)
IMM GRANULOCYTES NFR BLD: 0.3 %
LDLC SERPL CALC-MCNC: 149 MG/DL (ref ?–100)
LYMPHOCYTES # BLD AUTO: 2.13 X10(3) UL (ref 1–4)
LYMPHOCYTES NFR BLD AUTO: 34.6 %
MCH RBC QN AUTO: 31.2 PG (ref 26–34)
MCHC RBC AUTO-ENTMCNC: 31.4 G/DL (ref 31–37)
MCV RBC AUTO: 99.5 FL
MONOCYTES # BLD AUTO: 0.53 X10(3) UL (ref 0.1–1)
MONOCYTES NFR BLD AUTO: 8.6 %
NEUTROPHILS # BLD AUTO: 3.21 X10 (3) UL (ref 1.5–7.7)
NEUTROPHILS # BLD AUTO: 3.21 X10(3) UL (ref 1.5–7.7)
NEUTROPHILS NFR BLD AUTO: 52.3 %
NONHDLC SERPL-MCNC: 181 MG/DL (ref ?–130)
OSMOLALITY SERPL CALC.SUM OF ELEC: 287 MOSM/KG (ref 275–295)
PLATELET # BLD AUTO: 340 10(3)UL (ref 150–450)
POTASSIUM SERPL-SCNC: 5 MMOL/L (ref 3.5–5.1)
PROT SERPL-MCNC: 8.8 G/DL (ref 6.4–8.2)
RBC # BLD AUTO: 4.26 X10(6)UL
SODIUM SERPL-SCNC: 137 MMOL/L (ref 136–145)
TRIGL SERPL-MCNC: 181 MG/DL (ref 30–149)
VLDLC SERPL CALC-MCNC: 34 MG/DL (ref 0–30)
WBC # BLD AUTO: 6.2 X10(3) UL (ref 4–11)

## 2022-06-09 PROCEDURE — 80061 LIPID PANEL: CPT

## 2022-06-09 PROCEDURE — 36415 COLL VENOUS BLD VENIPUNCTURE: CPT

## 2022-06-09 PROCEDURE — 85025 COMPLETE CBC W/AUTO DIFF WBC: CPT

## 2022-06-09 PROCEDURE — 80053 COMPREHEN METABOLIC PANEL: CPT

## 2022-06-15 ENCOUNTER — TELEPHONE (OUTPATIENT)
Dept: INTERNAL MEDICINE CLINIC | Facility: CLINIC | Age: 67
End: 2022-06-15

## 2022-09-23 ENCOUNTER — OFFICE VISIT (OUTPATIENT)
Dept: NEUROLOGY | Facility: CLINIC | Age: 67
End: 2022-09-23

## 2022-09-23 VITALS
SYSTOLIC BLOOD PRESSURE: 102 MMHG | DIASTOLIC BLOOD PRESSURE: 72 MMHG | HEIGHT: 67 IN | RESPIRATION RATE: 18 BRPM | BODY MASS INDEX: 24.01 KG/M2 | HEART RATE: 81 BPM | WEIGHT: 153 LBS

## 2022-09-23 DIAGNOSIS — G50.0 TRIGEMINAL NEURALGIA: Primary | ICD-10-CM

## 2022-09-23 PROCEDURE — 99213 OFFICE O/P EST LOW 20 MIN: CPT | Performed by: OTHER

## 2022-09-23 RX ORDER — NORTRIPTYLINE HYDROCHLORIDE 50 MG/1
50 CAPSULE ORAL NIGHTLY
Qty: 90 CAPSULE | Refills: 0 | Status: SHIPPED | OUTPATIENT
Start: 2022-09-23

## 2022-09-27 DIAGNOSIS — G50.0 TRIGEMINAL NEURALGIA: ICD-10-CM

## 2022-09-27 RX ORDER — GABAPENTIN 600 MG/1
TABLET ORAL
Qty: 540 TABLET | Refills: 0 | Status: SHIPPED | OUTPATIENT
Start: 2022-09-27

## 2022-10-11 RX ORDER — ATORVASTATIN CALCIUM 20 MG/1
TABLET, FILM COATED ORAL
Qty: 90 TABLET | Refills: 0 | Status: SHIPPED | OUTPATIENT
Start: 2022-10-11

## 2022-10-11 NOTE — TELEPHONE ENCOUNTER
PASSED per protocol, refill sent.   Last PE 6.8.22   Future Appointments   Date Time Provider Christy Chantale   10/20/2022 10:00 AM [facility] PF OUTPT [facility]   10/27/2022 10:15 AM Rose Marie Darnell MD [address] [facility] HEM ONC Soraida Delfino   12/15/2022  9:00 AM Marbin Bah MD [address] [address] SUB GI   2/23/2023 11:35 AM DO OLIVIA Silvestre

## 2022-10-20 ENCOUNTER — LAB ENCOUNTER (OUTPATIENT)
Dept: LAB | Age: 67
End: 2022-10-20
Attending: INTERNAL MEDICINE
Payer: MEDICARE

## 2022-10-20 DIAGNOSIS — D47.2 MONOCLONAL GAMMOPATHY ASSOCIATED WITH PLASMA CELL DYSCRASIA: ICD-10-CM

## 2022-10-20 DIAGNOSIS — E03.9 HYPOTHYROIDISM, UNSPECIFIED TYPE: ICD-10-CM

## 2022-10-20 DIAGNOSIS — D47.2 SMOLDERING MYELOMA: ICD-10-CM

## 2022-10-20 LAB
ALBUMIN SERPL-MCNC: 3.2 G/DL (ref 3.4–5)
ALBUMIN/GLOB SERPL: 0.6 {RATIO} (ref 1–2)
ALP LIVER SERPL-CCNC: 94 U/L
ALT SERPL-CCNC: 24 U/L
ANION GAP SERPL CALC-SCNC: 2 MMOL/L (ref 0–18)
AST SERPL-CCNC: 18 U/L (ref 15–37)
BASOPHILS # BLD AUTO: 0.06 X10(3) UL (ref 0–0.2)
BASOPHILS NFR BLD AUTO: 0.8 %
BILIRUB SERPL-MCNC: 0.3 MG/DL (ref 0.1–2)
BUN BLD-MCNC: 21 MG/DL (ref 7–18)
CALCIUM BLD-MCNC: 9.3 MG/DL (ref 8.5–10.1)
CHLORIDE SERPL-SCNC: 107 MMOL/L (ref 98–112)
CO2 SERPL-SCNC: 30 MMOL/L (ref 21–32)
CREAT BLD-MCNC: 1.02 MG/DL
EOSINOPHIL # BLD AUTO: 0.22 X10(3) UL (ref 0–0.7)
EOSINOPHIL NFR BLD AUTO: 3.1 %
ERYTHROCYTE [DISTWIDTH] IN BLOOD BY AUTOMATED COUNT: 12.6 %
FASTING STATUS PATIENT QL REPORTED: NO
GFR SERPLBLD BASED ON 1.73 SQ M-ARVRAT: 60 ML/MIN/1.73M2 (ref 60–?)
GLOBULIN PLAS-MCNC: 5.4 G/DL (ref 2.8–4.4)
GLUCOSE BLD-MCNC: 107 MG/DL (ref 70–99)
HCT VFR BLD AUTO: 40.1 %
HGB BLD-MCNC: 13.1 G/DL
IGA SERPL-MCNC: 230 MG/DL (ref 70–312)
IGM SERPL-MCNC: 122 MG/DL (ref 43–279)
IMM GRANULOCYTES # BLD AUTO: 0.03 X10(3) UL (ref 0–1)
IMM GRANULOCYTES NFR BLD: 0.4 %
IMMUNOGLOBULIN PNL SER-MCNC: 2260 MG/DL (ref 791–1643)
LYMPHOCYTES # BLD AUTO: 1.84 X10(3) UL (ref 1–4)
LYMPHOCYTES NFR BLD AUTO: 25.7 %
MCH RBC QN AUTO: 32.2 PG (ref 26–34)
MCHC RBC AUTO-ENTMCNC: 32.7 G/DL (ref 31–37)
MCV RBC AUTO: 98.5 FL
MONOCYTES # BLD AUTO: 0.57 X10(3) UL (ref 0.1–1)
MONOCYTES NFR BLD AUTO: 8 %
NEUTROPHILS # BLD AUTO: 4.43 X10 (3) UL (ref 1.5–7.7)
NEUTROPHILS # BLD AUTO: 4.43 X10(3) UL (ref 1.5–7.7)
NEUTROPHILS NFR BLD AUTO: 62 %
OSMOLALITY SERPL CALC.SUM OF ELEC: 291 MOSM/KG (ref 275–295)
PLATELET # BLD AUTO: 289 10(3)UL (ref 150–450)
POTASSIUM SERPL-SCNC: 4.2 MMOL/L (ref 3.5–5.1)
PROT SERPL-MCNC: 8.6 G/DL (ref 6.4–8.2)
RBC # BLD AUTO: 4.07 X10(6)UL
SODIUM SERPL-SCNC: 139 MMOL/L (ref 136–145)
T4 FREE SERPL-MCNC: 1 NG/DL (ref 0.8–1.7)
TSI SER-ACNC: 4.98 MIU/ML (ref 0.36–3.74)
WBC # BLD AUTO: 7.2 X10(3) UL (ref 4–11)

## 2022-10-20 PROCEDURE — 82784 ASSAY IGA/IGD/IGG/IGM EACH: CPT

## 2022-10-20 PROCEDURE — 85025 COMPLETE CBC W/AUTO DIFF WBC: CPT

## 2022-10-20 PROCEDURE — 36415 COLL VENOUS BLD VENIPUNCTURE: CPT

## 2022-10-20 PROCEDURE — 84165 PROTEIN E-PHORESIS SERUM: CPT

## 2022-10-20 PROCEDURE — 83521 IG LIGHT CHAINS FREE EACH: CPT

## 2022-10-20 PROCEDURE — 80053 COMPREHEN METABOLIC PANEL: CPT

## 2022-10-20 PROCEDURE — 84443 ASSAY THYROID STIM HORMONE: CPT

## 2022-10-20 PROCEDURE — 84439 ASSAY OF FREE THYROXINE: CPT

## 2022-10-20 PROCEDURE — 86334 IMMUNOFIX E-PHORESIS SERUM: CPT

## 2022-10-21 DIAGNOSIS — E03.9 HYPOTHYROIDISM, UNSPECIFIED TYPE: Primary | ICD-10-CM

## 2022-10-26 LAB
ALBUMIN SERPL ELPH-MCNC: 3.75 G/DL (ref 3.75–5.21)
ALBUMIN/GLOB SERPL: 0.9 {RATIO} (ref 1–2)
ALPHA1 GLOB SERPL ELPH-MCNC: 0.36 G/DL (ref 0.19–0.46)
ALPHA2 GLOB SERPL ELPH-MCNC: 0.75 G/DL (ref 0.48–1.05)
B-GLOBULIN SERPL ELPH-MCNC: 0.87 G/DL (ref 0.68–1.23)
GAMMA GLOB SERPL ELPH-MCNC: 2.16 G/DL (ref 0.62–1.7)
KAPPA LC FREE SER-MCNC: 15.55 MG/DL (ref 0.33–1.94)
KAPPA LC FREE/LAMBDA FREE SER NEPH: 8.73 {RATIO} (ref 0.26–1.65)
LAMBDA LC FREE SERPL-MCNC: 1.78 MG/DL (ref 0.57–2.63)
M PROTEIN 1 SERPL ELPH-MCNC: 1.55 G/DL (ref ?–0)
PROT SERPL-MCNC: 7.9 G/DL (ref 6.4–8.2)

## 2022-10-27 ENCOUNTER — OFFICE VISIT (OUTPATIENT)
Dept: HEMATOLOGY/ONCOLOGY | Facility: HOSPITAL | Age: 67
End: 2022-10-27
Attending: INTERNAL MEDICINE
Payer: MEDICARE

## 2022-10-27 VITALS
TEMPERATURE: 98 F | BODY MASS INDEX: 24.78 KG/M2 | OXYGEN SATURATION: 99 % | SYSTOLIC BLOOD PRESSURE: 136 MMHG | HEIGHT: 66.69 IN | HEART RATE: 87 BPM | RESPIRATION RATE: 16 BRPM | WEIGHT: 156 LBS | DIASTOLIC BLOOD PRESSURE: 80 MMHG

## 2022-10-27 DIAGNOSIS — D47.2 MONOCLONAL GAMMOPATHY: ICD-10-CM

## 2022-10-27 DIAGNOSIS — D47.2 SMOLDERING MYELOMA: Primary | ICD-10-CM

## 2022-10-27 PROCEDURE — 99214 OFFICE O/P EST MOD 30 MIN: CPT | Performed by: INTERNAL MEDICINE

## 2022-11-03 ENCOUNTER — PATIENT MESSAGE (OUTPATIENT)
Dept: NEUROLOGY | Facility: CLINIC | Age: 67
End: 2022-11-03

## 2022-11-03 DIAGNOSIS — G50.0 TRIGEMINAL NEURALGIA: ICD-10-CM

## 2022-11-03 DIAGNOSIS — E03.9 HYPOTHYROIDISM, UNSPECIFIED TYPE: ICD-10-CM

## 2022-11-03 NOTE — TELEPHONE ENCOUNTER
From: Nabila Wooyd  To: Soraida Hunter DO  Sent: 11/3/2022 9:41 AM CDT  Subject: Oxcarbazepine    Dr Errol Delvalle - If I went on Oxcarbazepine, as you suggested, would that replace the Gabapentin or would it be in addition to the Gabapentin I currently take?

## 2022-11-04 RX ORDER — LEVOTHYROXINE SODIUM 0.07 MG/1
TABLET ORAL
Qty: 90 TABLET | Refills: 1 | Status: SHIPPED | OUTPATIENT
Start: 2022-11-04

## 2022-11-04 NOTE — TELEPHONE ENCOUNTER
Michael Epps Nurse (supporting [place], [place]) 13 hours ago (6:12 PM)     ALEE Cobian do it! When you put the prescription in, do it in the [employer] mail order for 90 days. I think if we do it that way, I'll get 3 months for the same price ($100+) as one month. I hope it works that way. So, if you recommend adding another drug to what I'm already taking,  lets do it! How do I start?

## 2022-11-14 RX ORDER — GABAPENTIN 300 MG/1
CAPSULE ORAL
Qty: 540 CAPSULE | Refills: 1 | Status: SHIPPED | OUTPATIENT
Start: 2022-11-14

## 2022-12-01 RX ORDER — OXCARBAZEPINE 300 MG/1
300 TABLET, FILM COATED ORAL 2 TIMES DAILY
Qty: 180 TABLET | Refills: 1 | Status: SHIPPED | OUTPATIENT
Start: 2022-12-01 | End: 2022-12-02

## 2022-12-02 ENCOUNTER — TELEPHONE (OUTPATIENT)
Dept: SURGERY | Facility: CLINIC | Age: 67
End: 2022-12-02

## 2022-12-02 DIAGNOSIS — G50.0 TRIGEMINAL NEURALGIA: ICD-10-CM

## 2022-12-02 RX ORDER — OXCARBAZEPINE 300 MG/1
300 TABLET, FILM COATED ORAL 2 TIMES DAILY
Qty: 180 TABLET | Refills: 1 | Status: SHIPPED | OUTPATIENT
Start: 2022-12-02

## 2022-12-02 NOTE — TELEPHONE ENCOUNTER
Pt requesting medication refill for OXcarbazepine 300 MG Oral Tab       [facility] [place] - [facility] AT PORTAL TO [facility], [phone], [phone]

## 2022-12-15 PROBLEM — D12.3 BENIGN NEOPLASM OF TRANSVERSE COLON: Status: ACTIVE | Noted: 2022-12-15

## 2022-12-15 PROBLEM — D12.8 BENIGN NEOPLASM OF RECTUM: Status: ACTIVE | Noted: 2022-12-15

## 2022-12-15 PROBLEM — D12.2 BENIGN NEOPLASM OF ASCENDING COLON: Status: ACTIVE | Noted: 2022-12-15

## 2022-12-15 PROBLEM — Z12.11 SPECIAL SCREENING FOR MALIGNANT NEOPLASM OF COLON: Status: ACTIVE | Noted: 2022-12-15

## 2022-12-19 RX ORDER — NORTRIPTYLINE HYDROCHLORIDE 50 MG/1
50 CAPSULE ORAL NIGHTLY
Qty: 90 CAPSULE | Refills: 0 | Status: SHIPPED | OUTPATIENT
Start: 2022-12-19

## 2022-12-21 ENCOUNTER — PATIENT MESSAGE (OUTPATIENT)
Dept: NEUROLOGY | Facility: CLINIC | Age: 67
End: 2022-12-21

## 2022-12-23 RX ORDER — NORTRIPTYLINE HYDROCHLORIDE 50 MG/1
CAPSULE ORAL
Qty: 90 CAPSULE | Refills: 0 | OUTPATIENT
Start: 2022-12-23

## 2022-12-28 RX ORDER — ATORVASTATIN CALCIUM 20 MG/1
TABLET, FILM COATED ORAL
Qty: 90 TABLET | Refills: 0 | Status: SHIPPED | OUTPATIENT
Start: 2022-12-28

## 2022-12-28 NOTE — TELEPHONE ENCOUNTER
Last visit- 06/08/2022 cpe seen by AS    Last refill- 10/11/2022 atorvastatin 20mg QTY90 0R    Last labs- 06/09/2022 lipid, cmp, cbc  Future Appointments   Date Time Provider Christy Kenyon   2/23/2023 11:35 AM Kristyn Cadet DO [facility] [place]   4/27/2023 10:45 AM Dheeraj Calloway MD [address] [facility] HEM ONC [facility] HOSPITALS AT [facility]   6/8/2023  8:45 AM Neha Steen MD [facility] ECC SUB GI       Per Protocol- Passed

## 2023-01-11 ENCOUNTER — PATIENT MESSAGE (OUTPATIENT)
Dept: NEUROLOGY | Facility: CLINIC | Age: 68
End: 2023-01-11

## 2023-01-11 DIAGNOSIS — G50.0 TRIGEMINAL NEURALGIA: Primary | ICD-10-CM

## 2023-01-11 DIAGNOSIS — G44.209 TENSION HEADACHE: ICD-10-CM

## 2023-01-11 RX ORDER — NORTRIPTYLINE HYDROCHLORIDE 10 MG/1
CAPSULE ORAL
Qty: 180 CAPSULE | Refills: 0 | Status: SHIPPED | OUTPATIENT
Start: 2023-01-11

## 2023-01-11 NOTE — TELEPHONE ENCOUNTER
Per Epic review:    -pt being followed for trigeminal neuralgia  -increased Nortriptyline from 20mg to 50mg at [facility] 9/2022    Per below, pt has not been tolerating the increased dose and went back down to 20mg and doing better. Sonya Labst message sent to pt to assess when she noted side effects to increased dosing and where she would like the refill of old dosing to go to. Once hearing back from pt, can pend tentative Rx for the lower dosing to preferred pharmacy and inform provider of pt condition update.

## 2023-01-11 NOTE — TELEPHONE ENCOUNTER
From: Ellen Savage  To: Frieda Turcios DO  Sent: 1/11/2023 8:43 AM CST  Subject: Nortriptyline    Dr. Carlos Auguste - the 50 mg Nortrip is not working for me. I was extremely tired - sleeping 11 hours a night plus an afternoon nap. My  said I was not myself. I could go into further detail but Im messaging you to let you know I took it upon myself to reduce the Nortrip from 50 back to 20. I feel much better and sleeping a normal amount of time. I hope this works and will keep you posted. I may need a refill of the 10mg Nortrip. Let me know if you have any questions or info to share with me. Thanks.

## 2023-02-23 ENCOUNTER — OFFICE VISIT (OUTPATIENT)
Dept: NEUROLOGY | Facility: CLINIC | Age: 68
End: 2023-02-23
Payer: MEDICARE

## 2023-02-23 VITALS
WEIGHT: 150 LBS | SYSTOLIC BLOOD PRESSURE: 130 MMHG | HEART RATE: 86 BPM | DIASTOLIC BLOOD PRESSURE: 70 MMHG | BODY MASS INDEX: 24 KG/M2 | RESPIRATION RATE: 16 BRPM

## 2023-02-23 DIAGNOSIS — G50.0 RIGHT TRIGEMINAL NEURALGIA: Primary | ICD-10-CM

## 2023-02-23 PROCEDURE — 99213 OFFICE O/P EST LOW 20 MIN: CPT | Performed by: OTHER

## 2023-03-13 ENCOUNTER — TELEPHONE (OUTPATIENT)
Dept: SURGERY | Facility: CLINIC | Age: 68
End: 2023-03-13

## 2023-03-14 ENCOUNTER — OFFICE VISIT (OUTPATIENT)
Dept: NEUROLOGY | Facility: CLINIC | Age: 68
End: 2023-03-14
Payer: MEDICARE

## 2023-03-14 ENCOUNTER — LAB ENCOUNTER (OUTPATIENT)
Dept: LAB | Age: 68
End: 2023-03-14
Attending: Other
Payer: MEDICARE

## 2023-03-14 VITALS
RESPIRATION RATE: 16 BRPM | HEART RATE: 74 BPM | BODY MASS INDEX: 24 KG/M2 | SYSTOLIC BLOOD PRESSURE: 124 MMHG | WEIGHT: 150 LBS | DIASTOLIC BLOOD PRESSURE: 68 MMHG

## 2023-03-14 DIAGNOSIS — R41.3 SHORT-TERM MEMORY LOSS: ICD-10-CM

## 2023-03-14 DIAGNOSIS — G50.0 RIGHT TRIGEMINAL NEURALGIA: ICD-10-CM

## 2023-03-14 DIAGNOSIS — R55 SYNCOPE, UNSPECIFIED SYNCOPE TYPE: Primary | ICD-10-CM

## 2023-03-14 LAB
ALBUMIN SERPL-MCNC: 3.9 G/DL (ref 3.4–5)
ALBUMIN/GLOB SERPL: 0.8 {RATIO} (ref 1–2)
ALP LIVER SERPL-CCNC: 108 U/L
ALT SERPL-CCNC: 34 U/L
ANION GAP SERPL CALC-SCNC: 3 MMOL/L (ref 0–18)
AST SERPL-CCNC: 23 U/L (ref 15–37)
BILIRUB SERPL-MCNC: 0.3 MG/DL (ref 0.1–2)
BUN BLD-MCNC: 14 MG/DL (ref 7–18)
CALCIUM BLD-MCNC: 9.7 MG/DL (ref 8.5–10.1)
CHLORIDE SERPL-SCNC: 95 MMOL/L (ref 98–112)
CO2 SERPL-SCNC: 32 MMOL/L (ref 21–32)
CREAT BLD-MCNC: 0.64 MG/DL
FASTING STATUS PATIENT QL REPORTED: YES
GFR SERPLBLD BASED ON 1.73 SQ M-ARVRAT: 96 ML/MIN/1.73M2 (ref 60–?)
GLOBULIN PLAS-MCNC: 5.1 G/DL (ref 2.8–4.4)
GLUCOSE BLD-MCNC: 100 MG/DL (ref 70–99)
OSMOLALITY SERPL CALC.SUM OF ELEC: 271 MOSM/KG (ref 275–295)
POTASSIUM SERPL-SCNC: 4.5 MMOL/L (ref 3.5–5.1)
PROT SERPL-MCNC: 9 G/DL (ref 6.4–8.2)
SODIUM SERPL-SCNC: 130 MMOL/L (ref 136–145)
TSI SER-ACNC: 2.39 MIU/ML (ref 0.36–3.74)
VIT B12 SERPL-MCNC: 708 PG/ML (ref 193–986)

## 2023-03-14 PROCEDURE — 36415 COLL VENOUS BLD VENIPUNCTURE: CPT

## 2023-03-14 PROCEDURE — 80183 DRUG SCRN QUANT OXCARBAZEPIN: CPT

## 2023-03-14 PROCEDURE — 82607 VITAMIN B-12: CPT

## 2023-03-14 PROCEDURE — 80053 COMPREHEN METABOLIC PANEL: CPT

## 2023-03-14 PROCEDURE — 84443 ASSAY THYROID STIM HORMONE: CPT

## 2023-03-14 PROCEDURE — 99214 OFFICE O/P EST MOD 30 MIN: CPT | Performed by: OTHER

## 2023-03-14 RX ORDER — ONDANSETRON 4 MG/1
1 TABLET, FILM COATED ORAL AS DIRECTED
COMMUNITY
Start: 2023-03-10

## 2023-03-14 NOTE — PROGRESS NOTES
Patient states she was wlking in a restaurant last Wednesday and fainted. Was taken by ambulance to hospital and stayed there for 3 days. No reoccurrence.  states she was having memory/ confusion issues since November/ December and balance issues.

## 2023-03-16 LAB — OXCARBAZEPINE METABOLITE: 11 UG/ML

## 2023-03-20 ENCOUNTER — OFFICE VISIT (OUTPATIENT)
Dept: INTERNAL MEDICINE CLINIC | Facility: CLINIC | Age: 68
End: 2023-03-20
Payer: MEDICARE

## 2023-03-20 VITALS
DIASTOLIC BLOOD PRESSURE: 88 MMHG | BODY MASS INDEX: 24.43 KG/M2 | HEIGHT: 66.69 IN | WEIGHT: 153.81 LBS | HEART RATE: 81 BPM | OXYGEN SATURATION: 98 % | SYSTOLIC BLOOD PRESSURE: 132 MMHG | RESPIRATION RATE: 18 BRPM

## 2023-03-20 DIAGNOSIS — D47.2 SMOLDERING MYELOMA: ICD-10-CM

## 2023-03-20 DIAGNOSIS — R55 SYNCOPE, UNSPECIFIED SYNCOPE TYPE: Primary | ICD-10-CM

## 2023-03-20 DIAGNOSIS — E03.9 HYPOTHYROIDISM, UNSPECIFIED TYPE: ICD-10-CM

## 2023-03-20 DIAGNOSIS — G50.0 TRIGEMINAL NEURALGIA: ICD-10-CM

## 2023-03-20 DIAGNOSIS — E78.00 HIGH CHOLESTEROL: ICD-10-CM

## 2023-03-20 DIAGNOSIS — E87.1 HYPONATREMIA: ICD-10-CM

## 2023-03-20 DIAGNOSIS — Z12.31 ENCOUNTER FOR SCREENING MAMMOGRAM FOR MALIGNANT NEOPLASM OF BREAST: ICD-10-CM

## 2023-03-20 PROCEDURE — 99214 OFFICE O/P EST MOD 30 MIN: CPT | Performed by: FAMILY MEDICINE

## 2023-03-20 PROCEDURE — 1111F DSCHRG MED/CURRENT MED MERGE: CPT | Performed by: FAMILY MEDICINE

## 2023-03-20 RX ORDER — ATORVASTATIN CALCIUM 20 MG/1
TABLET, FILM COATED ORAL
Qty: 90 TABLET | Refills: 0 | Status: SHIPPED | OUTPATIENT
Start: 2023-03-20

## 2023-03-20 NOTE — TELEPHONE ENCOUNTER
PASSED per protocol, refill sent.   Last PE 6.8.22  Future Appointments   Date Time Provider Christy Kenyon   3/20/2023  2:20 PM Daria Reyes MD [facility]   4/19/2023  7:00 AM  MR RM2 (1.5T WIDE)  [facility]   4/27/2023 10:45 AM Kerry Abreu MD [facility] HEM [address]   6/8/2023  2:15 PM Waylon Zafar MD [id number] [address] SUB GI   6/12/2023 10:00 AM Daria Reyes MD [facility]   7/20/2023  8:15 AM DO OLIVIA Corrales [facility] [place]

## 2023-03-29 ENCOUNTER — HOSPITAL ENCOUNTER (OUTPATIENT)
Dept: MAMMOGRAPHY | Age: 68
Discharge: HOME | End: 2023-03-29
Attending: FAMILY MEDICINE
Payer: MEDICARE

## 2023-03-29 DIAGNOSIS — Z12.31 ENCOUNTER FOR SCREENING MAMMOGRAM FOR MALIGNANT NEOPLASM OF BREAST: ICD-10-CM

## 2023-03-29 PROCEDURE — 77063 BREAST TOMOSYNTHESIS BI: CPT | Performed by: FAMILY MEDICINE

## 2023-03-29 PROCEDURE — 77067 SCR MAMMO BI INCL CAD: CPT | Performed by: FAMILY MEDICINE

## 2023-04-03 ENCOUNTER — LAB ENCOUNTER (OUTPATIENT)
Dept: LAB | Age: 68
End: 2023-04-03
Attending: FAMILY MEDICINE
Payer: MEDICARE

## 2023-04-03 DIAGNOSIS — E87.1 HYPONATREMIA: ICD-10-CM

## 2023-04-03 DIAGNOSIS — E78.00 HIGH CHOLESTEROL: ICD-10-CM

## 2023-04-03 LAB
ANION GAP SERPL CALC-SCNC: 3 MMOL/L (ref 0–18)
BUN BLD-MCNC: 20 MG/DL (ref 7–18)
CALCIUM BLD-MCNC: 9.5 MG/DL (ref 8.5–10.1)
CHLORIDE SERPL-SCNC: 99 MMOL/L (ref 98–112)
CHOLEST SERPL-MCNC: 206 MG/DL (ref ?–200)
CO2 SERPL-SCNC: 29 MMOL/L (ref 21–32)
CREAT BLD-MCNC: 0.8 MG/DL
FASTING PATIENT LIPID ANSWER: YES
FASTING STATUS PATIENT QL REPORTED: YES
GFR SERPLBLD BASED ON 1.73 SQ M-ARVRAT: 80 ML/MIN/1.73M2 (ref 60–?)
GLUCOSE BLD-MCNC: 92 MG/DL (ref 70–99)
HDLC SERPL-MCNC: 71 MG/DL (ref 40–59)
LDLC SERPL CALC-MCNC: 115 MG/DL (ref ?–100)
NONHDLC SERPL-MCNC: 135 MG/DL (ref ?–130)
OSMOLALITY SERPL CALC.SUM OF ELEC: 274 MOSM/KG (ref 275–295)
POTASSIUM SERPL-SCNC: 4.9 MMOL/L (ref 3.5–5.1)
SODIUM SERPL-SCNC: 131 MMOL/L (ref 136–145)
TRIGL SERPL-MCNC: 113 MG/DL (ref 30–149)
VLDLC SERPL CALC-MCNC: 20 MG/DL (ref 0–30)

## 2023-04-03 PROCEDURE — 80048 BASIC METABOLIC PNL TOTAL CA: CPT

## 2023-04-03 PROCEDURE — 80061 LIPID PANEL: CPT

## 2023-04-03 PROCEDURE — 36415 COLL VENOUS BLD VENIPUNCTURE: CPT

## 2023-04-06 ENCOUNTER — TELEPHONE (OUTPATIENT)
Dept: SURGERY | Facility: CLINIC | Age: 68
End: 2023-04-06

## 2023-04-06 DIAGNOSIS — G50.0 TRIGEMINAL NEURALGIA: Primary | ICD-10-CM

## 2023-04-06 RX ORDER — GABAPENTIN 300 MG/1
CAPSULE ORAL
Qty: 720 CAPSULE | Refills: 0 | Status: SHIPPED | OUTPATIENT
Start: 2023-04-06 | End: 2023-04-07

## 2023-04-06 NOTE — TELEPHONE ENCOUNTER
Pt requesting refill for GABAPENTIN 600 MG Oral Cap; [facility] [facility] to [employer], [phone], [phone]. Pt's best call back number [phone]. Endorsed to RN for provider.

## 2023-04-07 ENCOUNTER — PATIENT MESSAGE (OUTPATIENT)
Dept: NEUROLOGY | Facility: CLINIC | Age: 68
End: 2023-04-07

## 2023-04-07 DIAGNOSIS — G50.0 TRIGEMINAL NEURALGIA: ICD-10-CM

## 2023-04-07 RX ORDER — GABAPENTIN 600 MG/1
TABLET ORAL
Qty: 450 TABLET | Refills: 0 | Status: SHIPPED | OUTPATIENT
Start: 2023-04-07

## 2023-04-07 RX ORDER — GABAPENTIN 600 MG/1
TABLET ORAL
Qty: 540 TABLET | Refills: 0 | OUTPATIENT
Start: 2023-04-07

## 2023-04-07 NOTE — TELEPHONE ENCOUNTER
Cataract Surgery: Before Your Surgery  What is cataract surgery? Cataracts are cloudy areas in the lens of your eye. Your lens is behind the colored part of your eye (iris). Its job is to focus light onto the back of your eye. In some people, cataracts prevent light from reaching the back of the eye. This can cause vision problems. Cataract surgery helps you see better. It replaces your natural lens, which has become cloudy, with a clear artificial one. There are two types of cataract surgery. Phacoemulsification (say \"qzmh-bk-td-muu-wrb-xpr-TARA-shun\") is the most common type. The doctor makes a small cut in your eye. This cut is called an incision. The doctor uses a special ultrasound tool to break your cloudy lens apart. Sometimes a laser is used too. Then he or she removes the small pieces of the lens through the incision. In most cases, the doctor then inserts an artificial lens through the incision. Most people do not need stitches, because the incision is so small. If the doctor is not able to put in an artificial lens, you can wear a contact lens or thick glasses in place of your natural lens. Extracapsular extraction is a less common type of cataract surgery. The doctor makes a larger incision to remove the whole lens at once. After the doctor removes the lens, he or she stitches up the incision. Recovery from this type of surgery takes longer. Before either surgery, the doctor puts numbing drops in your eye. Some doctors use a shot instead. You may also get medicine to make you feel relaxed. You probably will not feel much pain. The surgery takes about 20 to 40 minutes. After surgery, you may have a bandage or shield on your eye. You will probably go home from surgery after 1 hour in the recovery room. Most people see better in 1 to 3 days. You may be able to go back to work or your normal routine in a few days.  It could take 3 to 10 weeks for your eye to completely heal. After your eye heals, Denied, already responded. you may still need to wear glasses, especially for reading. Follow-up care is a key part of your treatment and safety. Be sure to make and go to all appointments, and call your doctor if you are having problems. It's also a good idea to know your test results and keep a list of the medicines you take. What happens before surgery? Surgery can be stressful. This information will help you understand what you can expect. And it will help you safely prepare for surgery. Preparing for surgery  · Understand exactly what surgery is planned, along with the risks, benefits, and other options. · Tell your doctors ALL the medicines, vitamins, supplements, and herbal remedies you take. Some of these can increase the risk of bleeding or interact with anesthesia. · If you take blood thinners, such as warfarin (Coumadin), clopidogrel (Plavix), or aspirin, be sure to talk to your doctor. He or she will tell you if you should stop taking these medicines before your surgery. Make sure that you understand exactly what your doctor wants you to do. · Your doctor will tell you which medicines to take or stop before your surgery. You may need to stop taking certain medicines a week or more before surgery. So talk to your doctor as soon as you can. · If you have an advance directive, let your doctor know. It may include a living will and a durable power of  for health care. Bring a copy to the hospital. If you don't have one, you may want to prepare one. It lets your doctor and loved ones know your health care wishes. Doctors advise that everyone prepare these papers before any type of surgery or procedure. What happens on the day of surgery? · Follow the instructions exactly about when to stop eating and drinking. If you don't, your surgery may be canceled. If your doctor told you to take your medicines on the day of surgery, take them with only a sip of water. · Take a bath or shower before you come in for your surgery. Do not apply lotions, perfumes, deodorants, or nail polish. · Take off all jewelry and piercings. And take out contact lenses, if you wear them. At the hospital or surgery center  · Bring a picture ID. · The area for surgery is often marked to make sure there are no errors. · You will be kept comfortable and safe by your anesthesia provider. The anesthesia may make you sleep. Or it may just numb the area being worked on. · The surgery will take about 20 to 40 minutes. Going home  · Be sure you have someone to drive you home. Anesthesia and pain medicine make it unsafe for you to drive. · You will be given more specific instructions about recovering from your surgery. They will cover things like diet, wound care, follow-up care, driving, and getting back to your normal routine. · You may have a bandage or patch over your eye. You may also have a clear shield over your eye. This prevents you from rubbing it. When should you call your doctor? · You have questions or concerns. · You don't understand how to prepare for your surgery. · You become ill before the surgery (such as fever, flu, or a cold). · You need to reschedule or have changed your mind about having the surgery. Where can you learn more? Go to http://mihai-zach.info/. Enter K474 in the search box to learn more about \"Cataract Surgery: Before Your Surgery. \"  Current as of: May 23, 2016  Content Version: 11.2  © 2603-9850 [employer], [employer]. Care instructions adapted under license by [employer] (which disclaims liability or warranty for this information). If you have questions about a medical condition or this instruction, always ask your healthcare professional. [place] [address] any warranty or liability for your use of this information.

## 2023-04-07 NOTE — TELEPHONE ENCOUNTER
From: Layo Duarte  To: Pamela Sutton DO  Sent: 4/7/2023 7:41 AM CDT  Subject: medication refill    Yesterday I requested a refill of my Gabapentin 600 mg. I received a message late yesterday that it was my Gabapentin 300 mg that was refilled. Please correct this before they send the wrong mg and charge me for the wrong mg. I went online this morning and requested the 600 mg - hope you see this before [facility] ref[facility] and [facility] for the wrong pills. Thank you. I will also try to call this morning.

## 2023-04-07 NOTE — TELEPHONE ENCOUNTER
Pt requested a refill for GABAPENTIN 600 MG Oral Cap; she still has 300 MG tabs. Please correct with the Pharm before it ships.

## 2023-04-07 NOTE — TELEPHONE ENCOUNTER
Gabapentin 300mg cancelled [facility], [facility]). Patient notified Rx 600mg was e sribed to [facility].

## 2023-04-07 NOTE — TELEPHONE ENCOUNTER
Spoke with patient who states she is currently taking Gabapentin 900/900/900, had 3 weeks of \"bliss\" but now the facial pain is creeping back.

## 2023-04-15 DIAGNOSIS — E03.9 HYPOTHYROIDISM, UNSPECIFIED TYPE: ICD-10-CM

## 2023-04-17 ENCOUNTER — TELEPHONE (OUTPATIENT)
Dept: SURGERY | Facility: CLINIC | Age: 68
End: 2023-04-17

## 2023-04-17 RX ORDER — LEVOTHYROXINE SODIUM 0.07 MG/1
TABLET ORAL
Qty: 90 TABLET | Refills: 1 | Status: SHIPPED | OUTPATIENT
Start: 2023-04-17

## 2023-04-17 NOTE — TELEPHONE ENCOUNTER
Pt would like to know if it is possible she can increase dosage on gabapentin 600 MG Oral Tab pt states this weekend was in a lot of facial please advise

## 2023-04-17 NOTE — TELEPHONE ENCOUNTER
Patient advise regarding the gabapentin. She will increase per recommendations. Patient states she is currently taking oxcarbazepine BID as prescribed. Patient will call back with any questions.

## 2023-04-17 NOTE — TELEPHONE ENCOUNTER
Component  Ref Range & Units 3/14/23 10:05 AM   TSH  0.358 - 3.740 mIU/mL      Last seen by you 3/20/23
1 , , 2 SAB, 1st trimester with D&Cs

## 2023-04-17 NOTE — TELEPHONE ENCOUNTER
Would increase gabapentin from 900/900/1200mg to 1200/900/1200mg. She should also be taking trileptal 300mg BID. Can you please verify with her/her  what dose of the trileptal she was on when she starting having nausea and tiredness, and ended up in the hospital after passing out? I know it was when she tried increasing from 300mg BID to a higher dose, but not clear what dose- 1.5 tabs/450mg BID? If yes, then an in between dose of Trileptal such as 300mg in am, 450mg in pm may be an option for medication increase.

## 2023-04-19 ENCOUNTER — LAB ENCOUNTER (OUTPATIENT)
Dept: LAB | Age: 68
End: 2023-04-19
Attending: INTERNAL MEDICINE
Payer: MEDICARE

## 2023-04-19 ENCOUNTER — HOSPITAL ENCOUNTER (OUTPATIENT)
Dept: MRI IMAGING | Facility: HOSPITAL | Age: 68
Discharge: HOME | End: 2023-04-19
Attending: INTERNAL MEDICINE
Payer: MEDICARE

## 2023-04-19 DIAGNOSIS — D47.2 SMOLDERING MYELOMA: ICD-10-CM

## 2023-04-19 DIAGNOSIS — E03.9 HYPOTHYROIDISM, UNSPECIFIED TYPE: ICD-10-CM

## 2023-04-19 DIAGNOSIS — D47.2 MONOCLONAL GAMMOPATHY: ICD-10-CM

## 2023-04-19 LAB
ALBUMIN SERPL-MCNC: 3.3 G/DL (ref 3.4–5)
ALBUMIN/GLOB SERPL: 0.7 {RATIO} (ref 1–2)
ALP LIVER SERPL-CCNC: 104 U/L
ALT SERPL-CCNC: 28 U/L
ANION GAP SERPL CALC-SCNC: 2 MMOL/L (ref 0–18)
AST SERPL-CCNC: 21 U/L (ref 15–37)
BASOPHILS # BLD AUTO: 0.06 X10(3) UL (ref 0–0.2)
BASOPHILS NFR BLD AUTO: 1.1 %
BILIRUB SERPL-MCNC: 0.2 MG/DL (ref 0.1–2)
BUN BLD-MCNC: 13 MG/DL (ref 7–18)
CALCIUM BLD-MCNC: 9.3 MG/DL (ref 8.5–10.1)
CHLORIDE SERPL-SCNC: 98 MMOL/L (ref 98–112)
CO2 SERPL-SCNC: 30 MMOL/L (ref 21–32)
CREAT BLD-MCNC: 0.69 MG/DL
EOSINOPHIL # BLD AUTO: 0.1 X10(3) UL (ref 0–0.7)
EOSINOPHIL NFR BLD AUTO: 1.8 %
ERYTHROCYTE [DISTWIDTH] IN BLOOD BY AUTOMATED COUNT: 12.5 %
FASTING STATUS PATIENT QL REPORTED: YES
GFR SERPLBLD BASED ON 1.73 SQ M-ARVRAT: 94 ML/MIN/1.73M2 (ref 60–?)
GLOBULIN PLAS-MCNC: 5 G/DL (ref 2.8–4.4)
GLUCOSE BLD-MCNC: 93 MG/DL (ref 70–99)
HCT VFR BLD AUTO: 38.4 %
HGB BLD-MCNC: 12.8 G/DL
IGA SERPL-MCNC: 190 MG/DL (ref 70–312)
IGM SERPL-MCNC: 105 MG/DL (ref 43–279)
IMM GRANULOCYTES # BLD AUTO: 0.01 X10(3) UL (ref 0–1)
IMM GRANULOCYTES NFR BLD: 0.2 %
IMMUNOGLOBULIN PNL SER-MCNC: 2190 MG/DL (ref 791–1643)
LYMPHOCYTES # BLD AUTO: 2.07 X10(3) UL (ref 1–4)
LYMPHOCYTES NFR BLD AUTO: 36.9 %
M PROTEIN 24H UR ELPH-MRATE: 167.2 MG/24 HR (ref ?–149.1)
MCH RBC QN AUTO: 31.7 PG (ref 26–34)
MCHC RBC AUTO-ENTMCNC: 33.3 G/DL (ref 31–37)
MCV RBC AUTO: 95 FL
MONOCYTES # BLD AUTO: 0.55 X10(3) UL (ref 0.1–1)
MONOCYTES NFR BLD AUTO: 9.8 %
NEUTROPHILS # BLD AUTO: 2.82 X10 (3) UL (ref 1.5–7.7)
NEUTROPHILS # BLD AUTO: 2.82 X10(3) UL (ref 1.5–7.7)
NEUTROPHILS NFR BLD AUTO: 50.2 %
OSMOLALITY SERPL CALC.SUM OF ELEC: 270 MOSM/KG (ref 275–295)
PLATELET # BLD AUTO: 416 10(3)UL (ref 150–450)
POTASSIUM SERPL-SCNC: 4.2 MMOL/L (ref 3.5–5.1)
PROT SERPL-MCNC: 8.3 G/DL (ref 6.4–8.2)
RBC # BLD AUTO: 4.04 X10(6)UL
SODIUM SERPL-SCNC: 130 MMOL/L (ref 136–145)
SPECIMEN VOL UR: 2200 ML
T4 FREE SERPL-MCNC: 0.9 NG/DL (ref 0.8–1.7)
TSI SER-ACNC: 3.11 MIU/ML (ref 0.36–3.74)
WBC # BLD AUTO: 5.6 X10(3) UL (ref 4–11)

## 2023-04-19 PROCEDURE — 84166 PROTEIN E-PHORESIS/URINE/CSF: CPT

## 2023-04-19 PROCEDURE — 84165 PROTEIN E-PHORESIS SERUM: CPT

## 2023-04-19 PROCEDURE — 84443 ASSAY THYROID STIM HORMONE: CPT

## 2023-04-19 PROCEDURE — A9575 INJ GADOTERATE MEGLUMI 0.1ML: HCPCS | Performed by: INTERNAL MEDICINE

## 2023-04-19 PROCEDURE — 86334 IMMUNOFIX E-PHORESIS SERUM: CPT

## 2023-04-19 PROCEDURE — 82784 ASSAY IGA/IGD/IGG/IGM EACH: CPT

## 2023-04-19 PROCEDURE — 86335 IMMUNFIX E-PHORSIS/URINE/CSF: CPT

## 2023-04-19 PROCEDURE — 84156 ASSAY OF PROTEIN URINE: CPT

## 2023-04-19 PROCEDURE — 84439 ASSAY OF FREE THYROXINE: CPT

## 2023-04-19 PROCEDURE — 83521 IG LIGHT CHAINS FREE EACH: CPT

## 2023-04-19 PROCEDURE — 80053 COMPREHEN METABOLIC PANEL: CPT

## 2023-04-19 PROCEDURE — 76498 UNLISTED MR PROCEDURE: CPT | Performed by: INTERNAL MEDICINE

## 2023-04-19 PROCEDURE — 85025 COMPLETE CBC W/AUTO DIFF WBC: CPT

## 2023-04-19 PROCEDURE — 36415 COLL VENOUS BLD VENIPUNCTURE: CPT

## 2023-04-19 RX ORDER — GADOTERATE MEGLUMINE 376.9 MG/ML
15 INJECTION INTRAVENOUS
Status: COMPLETED | OUTPATIENT
Start: 2023-04-19 | End: 2023-04-19

## 2023-04-19 RX ADMIN — GADOTERATE MEGLUMINE 14 ML: 376.9 INJECTION INTRAVENOUS at 08:25:00

## 2023-04-21 LAB
ALBUMIN SERPL ELPH-MCNC: 4.2 G/DL (ref 3.75–5.21)
ALBUMIN/GLOB SERPL: 1.08 {RATIO} (ref 1–2)
ALPHA1 GLOB SERPL ELPH-MCNC: 0.33 G/DL (ref 0.19–0.46)
ALPHA2 GLOB SERPL ELPH-MCNC: 0.7 G/DL (ref 0.48–1.05)
B-GLOBULIN SERPL ELPH-MCNC: 0.83 G/DL (ref 0.68–1.23)
GAMMA GLOB SERPL ELPH-MCNC: 2.04 G/DL (ref 0.62–1.7)
KAPPA LC FREE SER-MCNC: 13.35 MG/DL (ref 0.33–1.94)
KAPPA LC FREE/LAMBDA FREE SER NEPH: 9.01 {RATIO} (ref 0.26–1.65)
LAMBDA LC FREE SERPL-MCNC: 1.48 MG/DL (ref 0.57–2.63)
M PROTEIN 1 SERPL ELPH-MCNC: 1.57 G/DL (ref ?–0)
PROT SERPL-MCNC: 8.1 G/DL (ref 6.4–8.2)

## 2023-04-27 ENCOUNTER — OFFICE VISIT (OUTPATIENT)
Dept: HEMATOLOGY/ONCOLOGY | Facility: HOSPITAL | Age: 68
End: 2023-04-27
Attending: INTERNAL MEDICINE
Payer: MEDICARE

## 2023-04-27 VITALS
RESPIRATION RATE: 12 BRPM | SYSTOLIC BLOOD PRESSURE: 138 MMHG | DIASTOLIC BLOOD PRESSURE: 83 MMHG | HEIGHT: 66.69 IN | BODY MASS INDEX: 24.78 KG/M2 | OXYGEN SATURATION: 99 % | WEIGHT: 156 LBS | HEART RATE: 81 BPM | TEMPERATURE: 97 F

## 2023-04-27 DIAGNOSIS — D47.2 SMOLDERING MYELOMA: Primary | ICD-10-CM

## 2023-04-27 DIAGNOSIS — G50.0 TRIGEMINAL NEURALGIA: ICD-10-CM

## 2023-04-27 DIAGNOSIS — D47.2 MONOCLONAL GAMMOPATHY: ICD-10-CM

## 2023-04-27 PROCEDURE — 99215 OFFICE O/P EST HI 40 MIN: CPT | Performed by: INTERNAL MEDICINE

## 2023-05-21 DIAGNOSIS — G50.0 TRIGEMINAL NEURALGIA: ICD-10-CM

## 2023-05-22 RX ORDER — OXCARBAZEPINE 300 MG/1
TABLET, FILM COATED ORAL
Qty: 180 TABLET | Refills: 0 | Status: SHIPPED | OUTPATIENT
Start: 2023-05-22

## 2023-06-08 PROBLEM — Z86.0101 HISTORY OF ADENOMATOUS POLYP OF COLON: Status: ACTIVE | Noted: 2023-06-08

## 2023-06-08 PROBLEM — Z86.010 HISTORY OF ADENOMATOUS POLYP OF COLON: Status: ACTIVE | Noted: 2023-06-08

## 2023-06-08 PROBLEM — D12.5 BENIGN NEOPLASM OF SIGMOID COLON: Status: ACTIVE | Noted: 2023-06-08

## 2023-06-14 ENCOUNTER — TELEPHONE (OUTPATIENT)
Dept: INTERNAL MEDICINE CLINIC | Facility: CLINIC | Age: 68
End: 2023-06-14

## 2023-06-14 DIAGNOSIS — Z13.228 SCREENING FOR METABOLIC DISORDER: ICD-10-CM

## 2023-06-14 DIAGNOSIS — E03.9 HYPOTHYROIDISM, UNSPECIFIED TYPE: Primary | ICD-10-CM

## 2023-06-14 DIAGNOSIS — E78.00 HIGH CHOLESTEROL: ICD-10-CM

## 2023-06-14 NOTE — TELEPHONE ENCOUNTER
Pt has appt next week, no \"orders\" TE was ever sent. Please call pt once labs have been placed.  [facility]      Future Appointments   Date Time Provider Christy Kenyon   6/20/2023  9:40 AM Daria Reyes MD [facility]

## 2023-06-20 ENCOUNTER — OFFICE VISIT (OUTPATIENT)
Dept: INTERNAL MEDICINE CLINIC | Facility: CLINIC | Age: 68
End: 2023-06-20
Payer: MEDICARE

## 2023-06-20 VITALS
DIASTOLIC BLOOD PRESSURE: 86 MMHG | HEIGHT: 66.69 IN | RESPIRATION RATE: 16 BRPM | OXYGEN SATURATION: 98 % | BODY MASS INDEX: 24.08 KG/M2 | SYSTOLIC BLOOD PRESSURE: 124 MMHG | HEART RATE: 74 BPM | WEIGHT: 151.63 LBS

## 2023-06-20 DIAGNOSIS — N81.10 BADEN-WALKER GRADE 2 CYSTOCELE: ICD-10-CM

## 2023-06-20 DIAGNOSIS — E03.9 HYPOTHYROIDISM, UNSPECIFIED TYPE: ICD-10-CM

## 2023-06-20 DIAGNOSIS — F41.8 ANXIETY ABOUT HEALTH: ICD-10-CM

## 2023-06-20 DIAGNOSIS — K80.20 CALCULUS OF GALLBLADDER WITHOUT CHOLECYSTITIS WITHOUT OBSTRUCTION: ICD-10-CM

## 2023-06-20 DIAGNOSIS — E78.00 HIGH CHOLESTEROL: ICD-10-CM

## 2023-06-20 DIAGNOSIS — Z00.00 ENCOUNTER FOR ANNUAL HEALTH EXAMINATION: Primary | ICD-10-CM

## 2023-06-20 DIAGNOSIS — G50.0 TRIGEMINAL NEURALGIA: ICD-10-CM

## 2023-06-20 DIAGNOSIS — D47.2 SMOLDERING MYELOMA: ICD-10-CM

## 2023-06-20 DIAGNOSIS — K63.5 POLYP OF COLON, UNSPECIFIED PART OF COLON, UNSPECIFIED TYPE: ICD-10-CM

## 2023-06-20 DIAGNOSIS — K76.0 FATTY LIVER: ICD-10-CM

## 2023-06-20 PROBLEM — Z12.11 SPECIAL SCREENING FOR MALIGNANT NEOPLASM OF COLON: Status: RESOLVED | Noted: 2022-12-15 | Resolved: 2023-06-20

## 2023-06-20 PROBLEM — Z96.0 PRESENCE OF PESSARY: Status: RESOLVED | Noted: 2022-06-08 | Resolved: 2023-06-20

## 2023-06-20 PROBLEM — D12.8 BENIGN NEOPLASM OF RECTUM: Status: RESOLVED | Noted: 2022-12-15 | Resolved: 2023-06-20

## 2023-06-20 PROBLEM — Z86.0101 HISTORY OF ADENOMATOUS POLYP OF COLON: Status: RESOLVED | Noted: 2023-06-08 | Resolved: 2023-06-20

## 2023-06-20 PROBLEM — D12.2 BENIGN NEOPLASM OF ASCENDING COLON: Status: RESOLVED | Noted: 2022-12-15 | Resolved: 2023-06-20

## 2023-06-20 PROBLEM — R79.89 ELEVATED LIVER FUNCTION TESTS: Status: RESOLVED | Noted: 2020-03-12 | Resolved: 2023-06-20

## 2023-06-20 PROBLEM — D12.3 BENIGN NEOPLASM OF TRANSVERSE COLON: Status: RESOLVED | Noted: 2022-12-15 | Resolved: 2023-06-20

## 2023-06-20 PROBLEM — D12.5 BENIGN NEOPLASM OF SIGMOID COLON: Status: RESOLVED | Noted: 2023-06-08 | Resolved: 2023-06-20

## 2023-06-20 PROBLEM — Z86.010 HISTORY OF ADENOMATOUS POLYP OF COLON: Status: RESOLVED | Noted: 2023-06-08 | Resolved: 2023-06-20

## 2023-06-20 PROCEDURE — 99214 OFFICE O/P EST MOD 30 MIN: CPT | Performed by: FAMILY MEDICINE

## 2023-06-20 PROCEDURE — G0439 PPPS, SUBSEQ VISIT: HCPCS | Performed by: FAMILY MEDICINE

## 2023-06-20 PROCEDURE — 1125F AMNT PAIN NOTED PAIN PRSNT: CPT | Performed by: FAMILY MEDICINE

## 2023-06-28 ENCOUNTER — TELEPHONE (OUTPATIENT)
Dept: NEUROLOGY | Facility: CLINIC | Age: 68
End: 2023-06-28

## 2023-06-28 RX ORDER — BACLOFEN 10 MG/1
TABLET ORAL
Qty: 60 TABLET | Refills: 1 | Status: SHIPPED | OUTPATIENT
Start: 2023-06-28

## 2023-06-30 ENCOUNTER — TELEPHONE (OUTPATIENT)
Dept: INTERNAL MEDICINE CLINIC | Facility: CLINIC | Age: 68
End: 2023-06-30

## 2023-06-30 NOTE — TELEPHONE ENCOUNTER
Received pre-op request from [place] [facility]. Pt having cataract surgery 10-11 and 10-25. Pt needs to schedule pre-op with [address]. Spoke to pt and she will be here 7-10 for a different pre-op appt and will schedule then.  Paper put in blue folder until pt schedules

## 2023-07-05 ENCOUNTER — LAB ENCOUNTER (OUTPATIENT)
Dept: LAB | Age: 68
End: 2023-07-05
Attending: FAMILY MEDICINE
Payer: MEDICARE

## 2023-07-05 DIAGNOSIS — E78.00 HIGH CHOLESTEROL: ICD-10-CM

## 2023-07-05 DIAGNOSIS — E03.9 HYPOTHYROIDISM, UNSPECIFIED TYPE: ICD-10-CM

## 2023-07-05 LAB
CHOLEST SERPL-MCNC: 201 MG/DL (ref ?–200)
FASTING PATIENT LIPID ANSWER: YES
HDLC SERPL-MCNC: 70 MG/DL (ref 40–59)
LDLC SERPL CALC-MCNC: 109 MG/DL (ref ?–100)
NONHDLC SERPL-MCNC: 131 MG/DL (ref ?–130)
TRIGL SERPL-MCNC: 127 MG/DL (ref 30–149)
TSI SER-ACNC: 2.11 MIU/ML (ref 0.36–3.74)
VLDLC SERPL CALC-MCNC: 22 MG/DL (ref 0–30)

## 2023-07-05 PROCEDURE — 36415 COLL VENOUS BLD VENIPUNCTURE: CPT

## 2023-07-05 PROCEDURE — 80061 LIPID PANEL: CPT

## 2023-07-05 PROCEDURE — 84443 ASSAY THYROID STIM HORMONE: CPT

## 2023-07-07 DIAGNOSIS — G50.0 TRIGEMINAL NEURALGIA: ICD-10-CM

## 2023-07-07 RX ORDER — GABAPENTIN 600 MG/1
TABLET ORAL
Qty: 165 TABLET | Refills: 2 | Status: SHIPPED | OUTPATIENT
Start: 2023-07-07

## 2023-07-07 NOTE — TELEPHONE ENCOUNTER
Medication: GABAPENTIN 600 MG Oral Tab      Date of last refill: 09/27/2022 (#540/0)  [place]     Last office visit: 03/14/2023  Due back to clinic per last office note:  Not stated  Date next office visit scheduled:    Future Appointments   Date Time Provider Christy Chantale   7/10/2023  8:00 AM Fabricio Doyle MD [facility]   7/20/2023  8:15 AM Zane Rogel, DO [facility] [place]   10/26/2023 10:15 AM Scarlett Lovett MD [facility] HEM ONC Isra Double           Last OV note recommendation:    Discussion/Plan:  Recent syncopal episode with hyponatremia- likely combination of side effect of higher dose trileptal and decreased PO intake  Refractory right trigeminal neuralgia, s/p cyberknife, s/p trigeminal rhizotomy in 5/2022, not effective  Short term memory loss- considerations include medication effect (trileptal, gabapentin), nutritional, less likely MCI     Check B12, TSH, and CMP  Trileptal: continue 300mg BID, check Trileptal level  Gabapentin: decrease to 900mg TID  Send an update regarding facial symptoms and if any fatigue or persistent memory issues in 1 month

## 2023-07-10 ENCOUNTER — LAB ENCOUNTER (OUTPATIENT)
Dept: LAB | Age: 68
End: 2023-07-10
Attending: FAMILY MEDICINE
Payer: MEDICARE

## 2023-07-10 ENCOUNTER — OFFICE VISIT (OUTPATIENT)
Dept: INTERNAL MEDICINE CLINIC | Facility: CLINIC | Age: 68
End: 2023-07-10
Payer: MEDICARE

## 2023-07-10 VITALS
HEART RATE: 76 BPM | BODY MASS INDEX: 23.98 KG/M2 | WEIGHT: 151 LBS | DIASTOLIC BLOOD PRESSURE: 68 MMHG | TEMPERATURE: 98 F | SYSTOLIC BLOOD PRESSURE: 112 MMHG | HEIGHT: 66.69 IN

## 2023-07-10 DIAGNOSIS — D47.2 SMOLDERING MYELOMA: ICD-10-CM

## 2023-07-10 DIAGNOSIS — Z01.818 PREOPERATIVE EXAMINATION: ICD-10-CM

## 2023-07-10 DIAGNOSIS — G50.0 TRIGEMINAL NEURALGIA: ICD-10-CM

## 2023-07-10 DIAGNOSIS — E03.9 HYPOTHYROIDISM, UNSPECIFIED TYPE: ICD-10-CM

## 2023-07-10 DIAGNOSIS — Z01.818 PREOPERATIVE EXAMINATION: Primary | ICD-10-CM

## 2023-07-10 DIAGNOSIS — E78.00 HIGH CHOLESTEROL: ICD-10-CM

## 2023-07-10 LAB
ALBUMIN SERPL-MCNC: 3.3 G/DL (ref 3.4–5)
ALBUMIN/GLOB SERPL: 0.7 {RATIO} (ref 1–2)
ALP LIVER SERPL-CCNC: 91 U/L
ALT SERPL-CCNC: 26 U/L
ANION GAP SERPL CALC-SCNC: 4 MMOL/L (ref 0–18)
APTT PPP: 25.6 SECONDS (ref 23.3–35.6)
AST SERPL-CCNC: 27 U/L (ref 15–37)
ATRIAL RATE: 75 BPM
BASOPHILS # BLD AUTO: 0.05 X10(3) UL (ref 0–0.2)
BASOPHILS NFR BLD AUTO: 0.8 %
BILIRUB SERPL-MCNC: 0.3 MG/DL (ref 0.1–2)
BILIRUB UR QL STRIP.AUTO: NEGATIVE
BUN BLD-MCNC: 17 MG/DL (ref 7–18)
CALCIUM BLD-MCNC: 9.3 MG/DL (ref 8.5–10.1)
CHLORIDE SERPL-SCNC: 107 MMOL/L (ref 98–112)
CO2 SERPL-SCNC: 29 MMOL/L (ref 21–32)
COLOR UR AUTO: YELLOW
CREAT BLD-MCNC: 1.02 MG/DL
EOSINOPHIL # BLD AUTO: 0.09 X10(3) UL (ref 0–0.7)
EOSINOPHIL NFR BLD AUTO: 1.5 %
ERYTHROCYTE [DISTWIDTH] IN BLOOD BY AUTOMATED COUNT: 12.4 %
FASTING STATUS PATIENT QL REPORTED: NO
GFR SERPLBLD BASED ON 1.73 SQ M-ARVRAT: 60 ML/MIN/1.73M2 (ref 60–?)
GLOBULIN PLAS-MCNC: 4.7 G/DL (ref 2.8–4.4)
GLUCOSE BLD-MCNC: 141 MG/DL (ref 70–99)
GLUCOSE UR STRIP.AUTO-MCNC: NEGATIVE MG/DL
HCT VFR BLD AUTO: 39 %
HGB BLD-MCNC: 12.4 G/DL
HYALINE CASTS #/AREA URNS AUTO: PRESENT /LPF
IMM GRANULOCYTES # BLD AUTO: 0.02 X10(3) UL (ref 0–1)
IMM GRANULOCYTES NFR BLD: 0.3 %
INR BLD: 0.91 (ref 0.85–1.16)
KETONES UR STRIP.AUTO-MCNC: NEGATIVE MG/DL
LYMPHOCYTES # BLD AUTO: 2.06 X10(3) UL (ref 1–4)
LYMPHOCYTES NFR BLD AUTO: 33.7 %
MCH RBC QN AUTO: 31.2 PG (ref 26–34)
MCHC RBC AUTO-ENTMCNC: 31.8 G/DL (ref 31–37)
MCV RBC AUTO: 98.2 FL
MONOCYTES # BLD AUTO: 0.58 X10(3) UL (ref 0.1–1)
MONOCYTES NFR BLD AUTO: 9.5 %
NEUTROPHILS # BLD AUTO: 3.32 X10 (3) UL (ref 1.5–7.7)
NEUTROPHILS # BLD AUTO: 3.32 X10(3) UL (ref 1.5–7.7)
NEUTROPHILS NFR BLD AUTO: 54.2 %
NITRITE UR QL STRIP.AUTO: NEGATIVE
OSMOLALITY SERPL CALC.SUM OF ELEC: 294 MOSM/KG (ref 275–295)
P AXIS: 77 DEGREES
P-R INTERVAL: 142 MS
PH UR STRIP.AUTO: 5 [PH] (ref 5–8)
PLATELET # BLD AUTO: 311 10(3)UL (ref 150–450)
POTASSIUM SERPL-SCNC: 3.9 MMOL/L (ref 3.5–5.1)
PROT SERPL-MCNC: 8 G/DL (ref 6.4–8.2)
PROT UR STRIP.AUTO-MCNC: 30 MG/DL
PROTHROMBIN TIME: 12.3 SECONDS (ref 11.6–14.8)
Q-T INTERVAL: 430 MS
QRS DURATION: 90 MS
QTC CALCULATION (BEZET): 480 MS
R AXIS: 68 DEGREES
RBC # BLD AUTO: 3.97 X10(6)UL
RBC #/AREA URNS AUTO: >10 /HPF
RBC UR QL AUTO: NEGATIVE
SODIUM SERPL-SCNC: 140 MMOL/L (ref 136–145)
SP GR UR STRIP.AUTO: 1.01 (ref 1–1.03)
T AXIS: 53 DEGREES
UROBILINOGEN UR STRIP.AUTO-MCNC: <2 MG/DL
VENTRICULAR RATE: 75 BPM
WBC # BLD AUTO: 6.1 X10(3) UL (ref 4–11)

## 2023-07-10 PROCEDURE — 80053 COMPREHEN METABOLIC PANEL: CPT

## 2023-07-10 PROCEDURE — 85610 PROTHROMBIN TIME: CPT

## 2023-07-10 PROCEDURE — 85025 COMPLETE CBC W/AUTO DIFF WBC: CPT

## 2023-07-10 PROCEDURE — 81001 URINALYSIS AUTO W/SCOPE: CPT

## 2023-07-10 PROCEDURE — 99214 OFFICE O/P EST MOD 30 MIN: CPT | Performed by: FAMILY MEDICINE

## 2023-07-10 PROCEDURE — 87086 URINE CULTURE/COLONY COUNT: CPT

## 2023-07-10 PROCEDURE — 93000 ELECTROCARDIOGRAM COMPLETE: CPT | Performed by: FAMILY MEDICINE

## 2023-07-10 PROCEDURE — 36415 COLL VENOUS BLD VENIPUNCTURE: CPT

## 2023-07-10 PROCEDURE — 85730 THROMBOPLASTIN TIME PARTIAL: CPT

## 2023-07-10 RX ORDER — ATORVASTATIN CALCIUM 20 MG/1
TABLET, FILM COATED ORAL
Qty: 90 TABLET | Refills: 0 | Status: SHIPPED
Start: 2023-07-10

## 2023-07-10 NOTE — TELEPHONE ENCOUNTER
PASSED per protocol, refill sent. Last PE: 6-. ..mk... High cholesterol  Continue statin, repeat with preop labs. - LIPID PANEL;  Future    Future Appointments   Date Time Provider Christy Kenyon   7/20/2023  8:15 AM Domenica Wills DO [facility] [place]   10/2/2023  2:20 PM Yani Macario MD [facility]   10/26/2023 10:15 AM Leah Herrera MD [address]

## 2023-07-10 NOTE — TELEPHONE ENCOUNTER
Pt scheduled on below for pre-op for cataract surgery - surgery on 10/11 for one eye and 10/25 for the other. Surgery with Dr. Rachana Rosenberg.     Future Appointments   Date Time Provider Christy Kenyon   10/2/2023  2:20 PM Graciela Pineda MD [facility]

## 2023-07-17 RX ORDER — ATORVASTATIN CALCIUM 20 MG/1
20 TABLET, FILM COATED ORAL NIGHTLY
Qty: 90 TABLET | Refills: 0 | OUTPATIENT
Start: 2023-07-17

## 2023-07-19 RX ORDER — ATORVASTATIN CALCIUM 20 MG/1
20 TABLET, FILM COATED ORAL NIGHTLY
Qty: 90 TABLET | Refills: 0 | Status: SHIPPED | OUTPATIENT
Start: 2023-07-19

## 2023-07-19 RX ORDER — ATORVASTATIN CALCIUM 20 MG/1
20 TABLET, FILM COATED ORAL NIGHTLY
Qty: 90 TABLET | Refills: 0 | Status: CANCELLED | OUTPATIENT
Start: 2023-07-19

## 2023-07-19 NOTE — TELEPHONE ENCOUNTER
Please send to local and mail order    Pt called today she has not got her atorvastatin to her mail order pharmacy and is now down to one pill. If we can send short term to her local pharmacy on file that would be great to ensure no treatment gap and also still wants 90 day sent to [facility] mail order. Script had printed during time we had issues with electronic scripts.

## 2023-07-20 ENCOUNTER — PATIENT MESSAGE (OUTPATIENT)
Dept: NEUROLOGY | Facility: CLINIC | Age: 68
End: 2023-07-20

## 2023-07-20 ENCOUNTER — OFFICE VISIT (OUTPATIENT)
Dept: NEUROLOGY | Facility: CLINIC | Age: 68
End: 2023-07-20
Payer: MEDICARE

## 2023-07-20 VITALS
BODY MASS INDEX: 24.46 KG/M2 | HEART RATE: 68 BPM | HEIGHT: 66.69 IN | RESPIRATION RATE: 17 BRPM | DIASTOLIC BLOOD PRESSURE: 60 MMHG | SYSTOLIC BLOOD PRESSURE: 102 MMHG | OXYGEN SATURATION: 95 % | WEIGHT: 154 LBS

## 2023-07-20 DIAGNOSIS — G50.0 TRIGEMINAL NEURALGIA: ICD-10-CM

## 2023-07-20 DIAGNOSIS — G50.0 RIGHT TRIGEMINAL NEURALGIA: Primary | ICD-10-CM

## 2023-07-20 DIAGNOSIS — G50.0 TRIGEMINAL NEURALGIA: Primary | ICD-10-CM

## 2023-07-20 PROCEDURE — 99213 OFFICE O/P EST LOW 20 MIN: CPT | Performed by: OTHER

## 2023-07-20 RX ORDER — GABAPENTIN 600 MG/1
TABLET ORAL
Qty: 495 TABLET | Refills: 1 | Status: SHIPPED | OUTPATIENT
Start: 2023-07-20

## 2023-07-20 RX ORDER — BACLOFEN 10 MG/1
10 TABLET ORAL NIGHTLY
Qty: 30 TABLET | Refills: 2 | Status: SHIPPED | OUTPATIENT
Start: 2023-07-20 | End: 2023-07-21

## 2023-07-20 NOTE — PROGRESS NOTES
[facility] [address]27   Neurology-f/u    Rosa De La Torre Patient Status:  No patient class for patient encounter    1955 MRN [id number]   Location [address] [facility] PCP Orly Charles MD              HPI:   Rosa De La Torre is a(n) 70year old female who presents for evaluation of trigeminal neuralgia. Reports 4 years ago after dental molding for dentures, shortly after had a mild twinge electric on her right upper lip. It was very intermittent. Then it seemed to spread, and she would have right lip and nose electric pains, lasting a second, when she would touch it, or brush her teeth, it would trigger it. It would last a seconds. Recently, she started having longer lasting jolts, occurring at night, lasting 20 -30 seconds, affecting her outer right nose and cheek, and right lip. She was seen in the ED in 10/2019 and started on Carbamazepine for trigeminal neuralgia. She is now on 200mg BID. The jolts can occur every few minutes. Carbamazepine helped in the beginning, but not as much anymore. Interim  Since last visit she reports no severe facial pain. Has mild twinges in between. Has pressure like headaches over both temples. Sleeps well. Denies neck pain. Use to chew nicotine gum a lot. She is tolerating carbamazepine. However, more tearful, and at times more sleepy. Takes 7am, 2pm, 9pm. Has been more tearful lately. Interim  Since last visit, dexa scan was obtained, which showed osteopenia, and repeat AST/ALT was further increased. Over the last several days, CBZ has been weaned, Eilleen Reach was her last dose. This am she is feeling a few jolts on her face. Interim  Since last visit, she reports she has had only a few jolts. She is off CBZ, and is tolerating gabapentin 900/900/1200mg. It has helped control her pain.  She is seeing a liver specialist.   Interim  Since last visit, she tried to wean the gabapentin, due to trying to be on a lower dose, and her facial pain increased. She went up to 2700mg total daily, with persistent right facial pain. She was started on nortriptyline by Dr. Zuleika Nieves when I was on maternity leave, and now she is on 50mg. She was also referred for cyberknife evaluation. Interim  Since last visit, she had the trigeminal nerve ablation in 10/2020. No complications. Before the procedure, she had increased gabapentin to 900/900/1200mg. Currently taking gabapentin 600/900/900mg. Interim  Since last visit, we decreased gabapentin to 600/600/900mg. She increased her nortriptyline to 20mg. Gabapentin was decreased due to lower GFR. She reports sleeps through the night, and during the day is not drowsy. Interim  Since last visit, she reports staying on gabapentin 600/600/900mg. For the past 3 weeks, she started having small breakthrough pains in the morning, waking her up at 4/5am. Lasting 15 minutes, one time 30 minutes. She has maintained on nortriptyline 20mg at night. Interim  Since last visit, she increased gabapentin to 900/900/1200, and facial pain is controlled. Nortriptyline is at 20mg nightly. She is wondering what she will do if pain comes back. Interim  Since last visit, patient had a rhizotomy on 5/16/22, about a week ago, at [facility], with Dr. Helio Fournier. Is to be effective in 4 weeks. At baseline, has R trigeminal pain a few times a day for seconds. Pain was flaring in 2/2022, on gabapentin 900/900/1200. We increased to 1200mg TID. I prescribed Trileptal but she did not fill it. She is taking nortriptyline 20mg nightly. Interim  Does not feel rhizotomy helped. R localized pain is around the nostril. Blowing nose can trigger it. She went up on gabapentin 1200mg TID and nortriptyline 20mg. Interim  Facial pain has been controlled. Nortriptyline lowered to 20mg, as 50mg made her sleepy and unsteady. Gabapentin she is not completely sure what dose she is on, but thinks still taking 1200mg TID.  She is taking trileptal 300mg BID.  Interim  Last week had a syncopal episode. Was at a  and then went for Citilog. Before she had eaten, she was sitting and started feeling LH. Then had LOC for about a minute. There was no confusion afterwards. Leading up to this she had been tired and nauseous, starting over a week ago. Started when she increased Trileptal from 300mg BID. With the syncopal episode she was hospitalized Na was 130 (baseline 139). In 2023 she fell when trying to get past a turning car. Has had intermittent confusion since 2022. She has noticed it, hard to keep track of everything. Making small errors in the checkbook. Has been more forgetful. Gait is fine now, but in December for 1-2 days had a shuffling gait. Making errors was noticed first in 2022. Interim  Had diarrhea with trileptal and we have since stopped one week ago. Transitioned to baclofen, and she is now at 10mg. Still taking gabapentin 1200/900/1200mg. No trigeminal flares. Having TN decompression surgery in a few weeks. Cyberknife was not helpful. Rhizotomy helped to a degree. Once every other day has a brief severe pain triggered by cold drink or certain mouth movements or teeth brushing. Meds tried  CBZ- liver issues  Trileptal- diarrhea, low Na      Pertinent imaging and laboratory work-up:   Component      Latest Ref Rng 3/14/2023   Patient Fasting for CMP? TSH      0.358 - 3.740 mIU/mL 2.390    Vitamin B12      193 - 986 pg/mL 708        Na 3/8/23 Na  Ucx 3/8/23- no growth  CT head 3/2023  IMPRESSION:   Within normal limits noncontrast CT head, with minimal   inflammatory changes in the left maxillary sinus. MRI trigeminal nerve  11/15/19-  CONCLUSION:       1. No acute intracranial abnormality identified. No evidence of vestibular schwannoma.      2. There is a branch of the left superior cerebellar artery which courses along the superior aspect of the cisternal segment of the left trigeminal nerve resulting in minimal local deformity and inferior displacement. A small vein is seen along the   superior aspect of the anterior portion of the cisternal segment of the right trigeminal nerve without significant displacement or deformity. Clinical correlation recommended. 3. Minimal chronic microvascular ischemic changes in the cerebral white matter. Component      Latest Ref Rng & Units 11/15/2019   WBC      4.0 - 11.0 x10(3) uL 8.2   RBC      3.80 - 5.30 x10(6)uL 4.21   Hemoglobin      12.0 - 16.0 g/dL 12.8   Hematocrit      35.0 - 48.0 % 40.7   Platelet Count      588.3 - 450.0 10(3)uL 371.0   MCV      80.0 - 100.0 fL 96.7   MCH      26.0 - 34.0 pg 30.4   MCHC      31.0 - 37.0 g/dL 31.4   RDW      11.0 - 15.0 % 13.4   RDW-SD      35.1 - 46.3 fL 48.1 (H)     Component      Latest Ref Rng & Units 11/15/2019   Glucose      70 - 99 mg/dL 84   Sodium      136 - 145 mmol/L 138   Potassium      3.5 - 5.1 mmol/L 4.5   Chloride      98 - 112 mmol/L 103   Carbon Dioxide, Total      21.0 - 32.0 mmol/L 32.0   ANION GAP      0 - 18 mmol/L 3   BUN      7 - 18 mg/dL 21 (H)   CREATININE      0.55 - 1.02 mg/dL 0.88   BUN/CREAT Ratio      10.0 - 20.0 23.9 (H)   CALCIUM      8.5 - 10.1 mg/dL 9.5   CALCULATED OSMOLALITY      275 - 295 mOsm/kg 288   eGFR NON-AFR.  AMERICAN      >=60 69   eGFR AFRICAN AMERICAN      >=60 80   AST (SGOT)      15 - 37 U/L 15   ALT (SGPT)      13 - 56 U/L 26   ALKALINE PHOSPHATASE      50 - 130 U/L 78   Total Bilirubin      0.1 - 2.0 mg/dL 0.3   TOTAL PROTEIN      6.4 - 8.2 g/dL 8.3 (H)   Albumin      3.4 - 5.0 g/dL 3.6     Past Medical History:  Past Medical History:   Diagnosis Date    Diarrhea, unspecified     Fatigue     high dose of gabapentin    Hearing loss     wear hearing aids    High cholesterol     I take meds for this    Hypercholesteremia     Hyperlipidemia     Presence of other cardiac implants and grafts     pessary    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 Pain started 2015 pain not severe. Pain starts to right cheek travel to the top of the head. It is a burning sharp sensation. Pt had a dental procedure in 2015 to be fitted for new dentures and bitting down on cold mold she began to then have pain. Triggers talking, eating, drinking, touch to face, brushing teeth, and cold wind hitting her face. Wears glasses     glasses      HL     Past Surgical History:  Past Surgical History:   Procedure Laterality Date    COLONOSCOPY  2013? CT CYBERKNIFE RAD PLANNING (NO IV) EM (CPT=77014) Right 10/30/2020    for trigeminal neuralgia- 75Gy in single fraction    CYST REMOVAL  1981    DAJA LOCALIZATION WIRE 1 SITE RIGHT (CPT=19281)  1981    SURGERY - EXTERNAL      acl surgery    TONSILLECTOMY  long long time ago       Family History:  family history includes Cancer (age of onset: 76) in her mother; No Known Problems in her daughter, maternal grandfather, maternal grandmother, paternal grandfather, paternal grandmother, son, and son. Social History:   reports that she quit smoking about 13 years ago. Her smoking use included cigarettes. She has a 43.75 pack-year smoking history. She has never used smokeless tobacco. She reports current alcohol use. She reports that she does not use drugs.     Allergies:    Penicillins             RASH    Comment:Occurred when she was a child, thinks its a rash    MEDICATIONS:    Current Outpatient Medications:     gabapentin 600 MG Oral Tab, Take 2 tablets (1200 mg) in the morning, Take 1.5 tablets (900 mg) in the afternoon and take 2 tablets (1200 mg) in the evening., Disp: 495 tablet, Rfl: 1    atorvastatin 20 MG Oral Tab, Take 1 tablet (20 mg total) by mouth nightly., Disp: 90 tablet, Rfl: 0    atorvastatin 20 MG Oral Tab, Take 1 tablet (20 mg total) by mouth nightly., Disp: 90 tablet, Rfl: 0 baclofen 10 MG Oral Tab, Week 1: take 1/2 tablet nightly, Week 2: take 1 tablet nightly, Week 3, if tolerating, and if facial pain is present, increase to 1/2 tab in am, and 1 tab in pm, Week 4: if facial pain not improved, and if tolerating, increase to 1 tablet twice a day. If improved, stay at this dose (or lower dose if helped). Otherwise can increase further to 1 tablet 3 times a day, Disp: 60 tablet, Rfl: 1    LEVOTHYROXINE 75 MCG Oral Tab, TAKE 1 TABLET BEFORE       BREAKFAST, Disp: 90 tablet, Rfl: 1    Omega-3 Fatty Acids (FISH OIL) 600 MG Oral Cap, Take by mouth., Disp: , Rfl:     Calcium Carb-Cholecalciferol (CALCIUM 600+D3 OR), Take 2 tablets by mouth daily. , Disp: , Rfl:     Cholecalciferol (VITAMIN D3) 25 MCG (1000 UT) Oral Cap, Take 1 tablet by mouth daily. , Disp: , Rfl:     Multiple Vitamins-Minerals (MULTIVITAMIN OR), Take by mouth daily. , Disp: , Rfl:       Review of Systems:   A comprehensive 10 point review of systems was completed. Pertinent positives and negatives noted in the HPI. PHYSICAL EXAM:   Neurologic Exam  Vitals   07/20/23  0822   BP: 102/60   Pulse: 68   Resp: 17     General Appearance: Patient is a 70year old female in no acute distress  Cardiac: Normal rate & regular rhythm  Lungs: Clear to auscultation bilaterally  Skin: There are no rashes or other skin lesions. Musculoskeletal: There is no scoliosis, or joint deformities  Neurologic examination:  Mental status: Patient is alert, attentive, and oriented x 3. Language is coherent and fluent without aphasia. Memory, comprehension and ability to follow commands were intact. Cranial nerves II-XII: Optic discs were sharp. Pupils were round and reacted to light. Extraocular movements were full. There was no face, jaw, palate or tongue weakness or atrophy. Hearing was grossly intact. Shoulder shrug was normal.   Motor exam revealed normal muscle bulk and tone. No atrophy or fasciculations.  Manual muscle testing revealed MRC grade 5/5 strength throughout including proximal and distal muscles of the arms and legs. Deep tendon reflexes were 2 at the biceps, brachioradialis, triceps, knee jerk, and ankle jerk. Plantar responses were flexor bilaterally. Sensory exam revealed normal light touch perception. Vibratory perception and proprioception were intact at the toes. Pinprick and temperature were normal. Romberg sign was absent. Complex motor skills revealed normal coordination. Finger-nose-finger intact. Gait was narrow and stable, was able to walk on heels, toes and tandem without any difficulty. ASSESSMENT/ACTIVE PROBLEM LIST:   Right trigeminal neuralgia  (primary encounter diagnosis)  Trigeminal neuralgia    Discussion/Plan:  Refractory trigeminal neuralgia- s/p cyberknife and rhizotomy, s/p tegretol, trileptal, gabapentin, now baclofen, and has had multiple side effects with medications  Now scheduled for microvascular decompression surgery for trigeminal neuralgia  Continue gabapentin 1200/900/1200mg  Continue baclofen 10mg nightly  Memory difficulty was due to trileptal    Requested Prescriptions     Signed Prescriptions Disp Refills    gabapentin 600 MG Oral Tab 495 tablet 1     Sig: Take 2 tablets (1200 mg) in the morning, Take 1.5 tablets (900 mg) in the afternoon and take 2 tablets (1200 mg) in the evening. We discussed in depth regarding the diagnosis, prognosis, treatment. The patient was given ample opportunity to ask questions. All questions and concerns were addressed.       Mihaela Chávez DO  Neuromuscular and General Neurology  [facility]

## 2023-07-20 NOTE — TELEPHONE ENCOUNTER
Medication: BACLOFEN 10 MG Oral Tab      Date of last refill: 06/28/2023 (#60/1)  [place]     Last office visit: 07/20/2023  Due back to clinic per last office note:  Not stated  Date next office visit scheduled:    Future Appointments   Date Time Provider Christy Chantale   10/2/2023  2:20 PM Daria Reyes MD [facility] [facility]   10/26/2023 10:15 AM Kerry Abreu MD [address] [facility]   1/2/2024  8:15 AM Mc Diaz DO [facility] [place]           Last OV note recommendation:    ASSESSMENT/ACTIVE PROBLEM LIST:   Right trigeminal neuralgia  (primary encounter diagnosis)  Trigeminal neuralgia     Discussion/Plan:  Refractory trigeminal neuralgia- s/p cyberknife and rhizotomy, s/p tegretol, trileptal, gabapentin, now baclofen, and has had multiple side effects with medications  Now scheduled for microvascular decompression surgery for trigeminal neuralgia  Continue gabapentin 1200/900/1200mg  Continue baclofen 10mg nightly  Memory difficulty was due to trileptal

## 2023-07-21 RX ORDER — BACLOFEN 10 MG/1
10 TABLET ORAL NIGHTLY
Qty: 90 TABLET | Refills: 0 | Status: SHIPPED | OUTPATIENT
Start: 2023-07-21

## 2023-07-21 NOTE — TELEPHONE ENCOUNTER
Original prescription sent on 7/20/23 cancelled and Rx Baclofen 10mg #90 sent to [facility] per written order.

## 2023-07-21 NOTE — TELEPHONE ENCOUNTER
From: Jenna Kong  To: Tom Renner DO  Sent: 7/20/2023 4:59 PM CDT  Subject: Baclofen    The prescription for Baclofen was sent to my local [facility] and I wanted it to go to [facility]. I see the Gabapentin 600mg went to [facility] and that is correct. Please send my prescription for Baclofen to [facility] . Thank you. a

## 2023-10-11 RX ORDER — ATORVASTATIN CALCIUM 20 MG/1
20 TABLET, FILM COATED ORAL NIGHTLY
Qty: 90 TABLET | Refills: 1 | Status: SHIPPED | OUTPATIENT
Start: 2023-10-11

## 2023-10-11 NOTE — TELEPHONE ENCOUNTER
Pt is calling to request that this refill be sent to her mail order. Not the local pharmacy. Medication Quantity Refills Start End   atorvastatin 20 MG Oral Tab 90 tablet 0 7/19/2023    Sig:   Take 1 tablet (20 mg total) by mouth nightly.      [address] to [employer], [phone], [phone] [address] [address] Phone: [phone] Fax: [phone]

## 2023-10-13 RX ORDER — ATORVASTATIN CALCIUM 20 MG/1
20 TABLET, FILM COATED ORAL NIGHTLY
Qty: 90 TABLET | Refills: 0 | Status: SHIPPED | OUTPATIENT
Start: 2023-10-13

## 2023-10-13 NOTE — TELEPHONE ENCOUNTER
PASSED per protocol, refill sent.   Last PE 6/20/23  Future Appointments   Date Time Provider Christy Kenyon   10/26/2023 10:15 AM Geovanna Maxwell MD [address] [facility] [address]   1/2/2024  8:15 AM DO OLIVIA Reveles

## 2023-10-15 DIAGNOSIS — E03.9 HYPOTHYROIDISM, UNSPECIFIED TYPE: ICD-10-CM

## 2023-10-17 RX ORDER — LEVOTHYROXINE SODIUM 0.07 MG/1
75 TABLET ORAL
Qty: 90 TABLET | Refills: 1 | Status: SHIPPED | OUTPATIENT
Start: 2023-10-17

## 2023-10-17 NOTE — TELEPHONE ENCOUNTER
Last OV: Pre-op [address] 07/10/23   Last PE: Wellness visit [address] 06/20/23     Future Appointments   Date Time Provider Christy Kenyon   10/26/2023 10:15 AM Dheeraj Calloway MD [address] [facility]   1/2/2024  8:15 AM DO OLIVIA Mckenzie        Latest labs:   CBC, CMP,Lipid, TSH 07/10/23  Latest RX:    Disp Refills Start End    LEVOTHYROXINE 75 MCG Oral Tab 90 tablet 1 4/17/2023     Sig: TAKE 1 TABLET BEFORE       BREAKFAST    Sent to pharmacy as: Levothyroxine Sodium 75 MCG Oral Tablet (Synthroid)    E-Prescribing Status: Receipt confirmed by pharmacy (4/17/2023  8:45 AM CDT)      Per protocol      Thyroid Supplements Protocol Dtdrna28/15/2023 07:32 AM   Protocol Details TSH test in past 12 months    TSH value between 0.350 and 5.500 IU/ml    Appointment in past 12 or next 3 months

## 2023-10-18 ENCOUNTER — LAB ENCOUNTER (OUTPATIENT)
Dept: LAB | Age: 68
End: 2023-10-18
Attending: INTERNAL MEDICINE
Payer: MEDICARE

## 2023-10-18 DIAGNOSIS — D47.2 SMOLDERING MYELOMA: ICD-10-CM

## 2023-10-18 DIAGNOSIS — E03.9 HYPOTHYROIDISM, UNSPECIFIED TYPE: ICD-10-CM

## 2023-10-18 LAB
ALBUMIN SERPL-MCNC: 3.4 G/DL (ref 3.4–5)
ALBUMIN/GLOB SERPL: 0.6 {RATIO} (ref 1–2)
ALP LIVER SERPL-CCNC: 91 U/L
ALT SERPL-CCNC: 30 U/L
ANION GAP SERPL CALC-SCNC: 3 MMOL/L (ref 0–18)
AST SERPL-CCNC: 19 U/L (ref 15–37)
BASOPHILS # BLD AUTO: 0.07 X10(3) UL (ref 0–0.2)
BASOPHILS NFR BLD AUTO: 1.2 %
BILIRUB SERPL-MCNC: 0.5 MG/DL (ref 0.1–2)
BUN BLD-MCNC: 18 MG/DL (ref 7–18)
CALCIUM BLD-MCNC: 9.7 MG/DL (ref 8.5–10.1)
CHLORIDE SERPL-SCNC: 105 MMOL/L (ref 98–112)
CO2 SERPL-SCNC: 31 MMOL/L (ref 21–32)
CREAT BLD-MCNC: 1.17 MG/DL
EGFRCR SERPLBLD CKD-EPI 2021: 51 ML/MIN/1.73M2 (ref 60–?)
EOSINOPHIL # BLD AUTO: 0.13 X10(3) UL (ref 0–0.7)
EOSINOPHIL NFR BLD AUTO: 2.2 %
ERYTHROCYTE [DISTWIDTH] IN BLOOD BY AUTOMATED COUNT: 12.5 %
FASTING STATUS PATIENT QL REPORTED: NO
GLOBULIN PLAS-MCNC: 5.4 G/DL (ref 2.8–4.4)
GLUCOSE BLD-MCNC: 91 MG/DL (ref 70–99)
HCT VFR BLD AUTO: 40.4 %
HGB BLD-MCNC: 13.2 G/DL
IGA SERPL-MCNC: 227 MG/DL (ref 70–312)
IGM SERPL-MCNC: 119 MG/DL (ref 43–279)
IMM GRANULOCYTES # BLD AUTO: 0.02 X10(3) UL (ref 0–1)
IMM GRANULOCYTES NFR BLD: 0.3 %
IMMUNOGLOBULIN PNL SER-MCNC: 2410 MG/DL (ref 791–1643)
LYMPHOCYTES # BLD AUTO: 2.49 X10(3) UL (ref 1–4)
LYMPHOCYTES NFR BLD AUTO: 41.8 %
MCH RBC QN AUTO: 31.4 PG (ref 26–34)
MCHC RBC AUTO-ENTMCNC: 32.7 G/DL (ref 31–37)
MCV RBC AUTO: 96.2 FL
MONOCYTES # BLD AUTO: 0.55 X10(3) UL (ref 0.1–1)
MONOCYTES NFR BLD AUTO: 9.2 %
NEUTROPHILS # BLD AUTO: 2.7 X10 (3) UL (ref 1.5–7.7)
NEUTROPHILS # BLD AUTO: 2.7 X10(3) UL (ref 1.5–7.7)
NEUTROPHILS NFR BLD AUTO: 45.3 %
OSMOLALITY SERPL CALC.SUM OF ELEC: 289 MOSM/KG (ref 275–295)
PLATELET # BLD AUTO: 349 10(3)UL (ref 150–450)
POTASSIUM SERPL-SCNC: 4.5 MMOL/L (ref 3.5–5.1)
PROT SERPL-MCNC: 8.8 G/DL (ref 6.4–8.2)
RBC # BLD AUTO: 4.2 X10(6)UL
SODIUM SERPL-SCNC: 139 MMOL/L (ref 136–145)
T4 FREE SERPL-MCNC: 1.1 NG/DL (ref 0.8–1.7)
TSI SER-ACNC: 2.25 MIU/ML (ref 0.36–3.74)
WBC # BLD AUTO: 6 X10(3) UL (ref 4–11)

## 2023-10-18 PROCEDURE — 36415 COLL VENOUS BLD VENIPUNCTURE: CPT

## 2023-10-18 PROCEDURE — 84439 ASSAY OF FREE THYROXINE: CPT

## 2023-10-18 PROCEDURE — 84165 PROTEIN E-PHORESIS SERUM: CPT

## 2023-10-18 PROCEDURE — 84443 ASSAY THYROID STIM HORMONE: CPT

## 2023-10-18 PROCEDURE — 82784 ASSAY IGA/IGD/IGG/IGM EACH: CPT

## 2023-10-18 PROCEDURE — 83521 IG LIGHT CHAINS FREE EACH: CPT

## 2023-10-18 PROCEDURE — 86334 IMMUNOFIX E-PHORESIS SERUM: CPT

## 2023-10-18 PROCEDURE — 80053 COMPREHEN METABOLIC PANEL: CPT

## 2023-10-18 PROCEDURE — 85025 COMPLETE CBC W/AUTO DIFF WBC: CPT

## 2023-10-19 LAB
ALBUMIN SERPL ELPH-MCNC: 4.22 G/DL (ref 3.75–5.21)
ALBUMIN/GLOB SERPL: 0.99 {RATIO} (ref 1–2)
ALPHA1 GLOB SERPL ELPH-MCNC: 0.33 G/DL (ref 0.19–0.46)
ALPHA2 GLOB SERPL ELPH-MCNC: 0.75 G/DL (ref 0.48–1.05)
B-GLOBULIN SERPL ELPH-MCNC: 0.93 G/DL (ref 0.68–1.23)
GAMMA GLOB SERPL ELPH-MCNC: 2.27 G/DL (ref 0.62–1.7)
KAPPA LC FREE SER-MCNC: 15.45 MG/DL (ref 0.33–1.94)
KAPPA LC FREE/LAMBDA FREE SER NEPH: 10.09 {RATIO} (ref 0.26–1.65)
LAMBDA LC FREE SERPL-MCNC: 1.53 MG/DL (ref 0.57–2.63)
M PROTEIN 1 SERPL ELPH-MCNC: 1.62 G/DL (ref ?–0)
PROT SERPL-MCNC: 8.5 G/DL (ref 6.4–8.2)

## 2023-10-26 ENCOUNTER — OFFICE VISIT (OUTPATIENT)
Dept: HEMATOLOGY/ONCOLOGY | Facility: HOSPITAL | Age: 68
End: 2023-10-26
Attending: INTERNAL MEDICINE

## 2023-10-26 VITALS
TEMPERATURE: 98 F | WEIGHT: 154 LBS | HEIGHT: 66.69 IN | SYSTOLIC BLOOD PRESSURE: 128 MMHG | RESPIRATION RATE: 18 BRPM | DIASTOLIC BLOOD PRESSURE: 76 MMHG | HEART RATE: 83 BPM | BODY MASS INDEX: 24.46 KG/M2 | OXYGEN SATURATION: 97 %

## 2023-10-26 DIAGNOSIS — R77.1 ABNORMALITY OF GLOBULIN: ICD-10-CM

## 2023-10-26 DIAGNOSIS — D47.2 SMOLDERING MYELOMA: Primary | ICD-10-CM

## 2023-10-26 PROCEDURE — 99214 OFFICE O/P EST MOD 30 MIN: CPT | Performed by: INTERNAL MEDICINE

## 2023-10-26 NOTE — PROGRESS NOTES
Outpatient Oncology Care Plan  Problem list:  knowledge deficit     Problems related to:    disease/disease progression     Interventions:  provided general teaching     Expected outcomes:  understands plan of care     Progress towards outcome:  making progress     Education Record     Learner:  Patient  Barriers / Limitations:  None  Method:  Brief focused  Outcome:  Shows understanding  Comments: 6 month f/up smoldering myeloma. Pt states she has been feeling well and her energy has been good. Looking to discuss labs. Pt states she had microvascular decompression in July to address her trigeminal neuralgia. Pt states surgery was successful.

## 2023-12-27 DIAGNOSIS — E78.00 HIGH CHOLESTEROL: Primary | ICD-10-CM

## 2023-12-27 NOTE — TELEPHONE ENCOUNTER
Pt needing refill of below to go to [facility]        [address], [address], [phone], [phone] [[id number]]           ATORVASTATIN 20 MG Oral Tab 90 tablet 0 10/13/2023 --    Sig - Route: TAKE 1 TABLET BY MOUTH EVERY DAY AT NIGHT - Oral    Sent to pharmacy as:  Atorvastatin Calcium 20 MG Oral Tablet (Lipitor)

## 2023-12-28 RX ORDER — ATORVASTATIN CALCIUM 20 MG/1
20 TABLET, FILM COATED ORAL NIGHTLY
Qty: 90 TABLET | Refills: 0 | Status: SHIPPED | OUTPATIENT
Start: 2023-12-28

## 2024-01-02 ENCOUNTER — OFFICE VISIT (OUTPATIENT)
Dept: NEUROLOGY | Facility: CLINIC | Age: 69
End: 2024-01-02
Payer: MEDICARE

## 2024-01-02 VITALS
BODY MASS INDEX: 24 KG/M2 | RESPIRATION RATE: 16 BRPM | SYSTOLIC BLOOD PRESSURE: 118 MMHG | WEIGHT: 150.19 LBS | DIASTOLIC BLOOD PRESSURE: 58 MMHG

## 2024-01-02 DIAGNOSIS — G50.0 RIGHT TRIGEMINAL NEURALGIA: Primary | ICD-10-CM

## 2024-01-02 PROCEDURE — 99213 OFFICE O/P EST LOW 20 MIN: CPT | Performed by: OTHER

## 2024-01-02 NOTE — PROGRESS NOTES
[facility] - [facility]   Neurology-f/u    Nilesh Garcia Patient Status:  No patient class for patient encounter    1955 MRN [id number]   Location [facility], [facility] PCP Mathew Juarez MD              HPI:   Nilesh Garcia is a(n) 68 year old female who presents for evaluation of trigeminal neuralgia. Reports 4 years ago after dental molding for dentures, shortly after had a mild twinge electric on her right upper lip. It was very intermittent. Then it seemed to spread, and she would have right lip and nose electric pains, lasting a second, when she would touch it, or brush her teeth, it would trigger it. It would last a seconds. Recently, she started having longer lasting jolts, occurring at night, lasting 20 -30 seconds, affecting her outer right nose and cheek, and right lip. She was seen in the ED in 10/2019 and started on Carbamazepine for trigeminal neuralgia. She is now on 200mg BID. The jolts can occur every few minutes. Carbamazepine helped in the beginning, but not as much anymore.   Interim  Since last visit she reports no severe facial pain. Has mild twinges in between. Has pressure like headaches over both temples. Sleeps well. Denies neck pain. Use to chew nicotine gum a lot.  She is tolerating carbamazepine. However, more tearful, and at times more sleepy. Takes 7am, 2pm, 9pm. Has been more tearful lately.  Interim  Since last visit, dexa scan was obtained, which showed osteopenia, and repeat AST/ALT was further increased. Over the last several days, CBZ has been weaned, Yesterday was her last dose. This am she is feeling a few jolts on her face.   Interim  Since last visit, she reports she has had only a few jolts. She is off CBZ, and is tolerating gabapentin 900/900/1200mg. It has helped control her pain. She is seeing a liver specialist.   Interim  Since last visit, she tried to wean the gabapentin, due to trying to be on a lower dose, and her  facial pain increased. She went up to 2700mg total daily, with persistent right facial pain. She was started on nortriptyline by Dr. Reynoso when I was on maternity leave, and now she is on 50mg. She was also referred for cyberknife evaluation.   Interim  Since last visit, she had the trigeminal nerve ablation in 10/2020. No complications. Before the procedure, she had increased gabapentin to 900/900/1200mg. Currently taking gabapentin 600/900/900mg.   Interim  Since last visit, we decreased gabapentin to 600/600/900mg. She increased her nortriptyline to 20mg. Gabapentin was decreased due to lower GFR. She reports sleeps through the night, and during the day is not drowsy.   Interim  Since last visit, she reports staying on gabapentin 600/600/900mg. For the past 3 weeks, she started having small breakthrough pains in the morning, waking her up at 4/5am. Lasting 15 minutes, one time 30 minutes. She has maintained on nortriptyline 20mg at night.   Interim  Since last visit, she increased gabapentin to 900/900/1200, and facial pain is controlled. Nortriptyline is at 20mg nightly. She is wondering what she will do if pain comes back.  Interim  Since last visit, patient had a rhizotomy on 5/16/22, about a week ago, at [place], with Dr. Suárez. Is to be effective in 4 weeks. At baseline, has R trigeminal pain a few times a day for seconds. Pain was flaring in 2/2022, on gabapentin 900/900/1200. We increased to 1200mg TID. I prescribed Trileptal but she did not fill it. She is taking nortriptyline 20mg nightly.  Interim  Does not feel rhizotomy helped. R localized pain is around the nostril. Blowing nose can trigger it. She went up on gabapentin 1200mg TID and nortriptyline 20mg.   Interim  Facial pain has been controlled. Nortriptyline lowered to 20mg, as 50mg made her sleepy and unsteady. Gabapentin she is not completely sure what dose she is on, but thinks still taking 1200mg TID. She is taking trileptal 300mg  BID.  Interim  Last week had a syncopal episode. Was at a  and then went for [employer]. Before she had eaten, she was sitting and started feeling LH. Then had LOC for about a minute. There was no confusion afterwards. Leading up to this she had been tired and nauseous, starting over a week ago. Started when she increased Trileptal from 300mg BID. With the syncopal episode she was hospitalized Na was 130 (baseline 139). In 2023 she fell when trying to get past a turning car. Has had intermittent confusion since 2022. She has noticed it, hard to keep track of everything. Making small errors in the checkbook. Has been more forgetful. Gait is fine now, but in December for 1-2 days had a shuffling gait. Making errors was noticed first in 2022.  Interim  Had diarrhea with trileptal and we have since stopped one week ago. Transitioned to baclofen, and she is now at 10mg. Still taking gabapentin 1200/900/1200mg. No trigeminal flares. Having TN decompression surgery in a few weeks. Cyberknife was not helpful. Rhizotomy helped to a degree. Once every other day has a brief severe pain triggered by cold drink or certain mouth movements or teeth brushing.  Interim  Patient had surgery on 2023, microvascular decompression, placement of gregory pad. She gradually weaned off gabapentin starting 3 weeks after surgery and then baclofen. She has been off medications for 30 days now. Denies any right facial pain.            Meds tried  CBZ- liver issues  Trileptal- diarrhea, low Na, memory difficulty      Pertinent imaging and laboratory work-up:   Component      Latest Ref Rng 3/14/2023   Patient Fasting for CMP?    TSH      0.358 - 3.740 mIU/mL 2.390    Vitamin B12      193 - 986 pg/mL 708        Na 3/8/23 Na  Ucx 3/8/23- no growth  CT head 3/2023  IMPRESSION:   Within normal limits noncontrast CT head, with minimal   inflammatory changes in the left maxillary sinus.       MRI trigeminal nerve   11/15/19-  CONCLUSION:       1. No acute intracranial abnormality identified.  No evidence of vestibular schwannoma.     2. There is a branch of the left superior cerebellar artery which courses along the superior aspect of the cisternal segment of the left trigeminal nerve resulting in minimal local deformity and inferior displacement.  A small vein is seen along the   superior aspect of the anterior portion of the cisternal segment of the right trigeminal nerve without significant displacement or deformity.  Clinical correlation recommended.     3. Minimal chronic microvascular ischemic changes in the cerebral white matter.    Component      Latest Ref Rng & Units 11/15/2019   WBC      4.0 - 11.0 x10(3) uL 8.2   RBC      3.80 - 5.30 x10(6)uL 4.21   Hemoglobin      12.0 - 16.0 g/dL 12.8   Hematocrit      35.0 - 48.0 % 40.7   Platelet Count      150.0 - 450.0 10(3)uL 371.0   MCV      80.0 - 100.0 fL 96.7   MCH      26.0 - 34.0 pg 30.4   MCHC      31.0 - 37.0 g/dL 31.4   RDW      11.0 - 15.0 % 13.4   RDW-SD      35.1 - 46.3 fL 48.1 (H)     Component      Latest Ref Rng & Units 11/15/2019   Glucose      70 - 99 mg/dL 84   Sodium      136 - 145 mmol/L 138   Potassium      3.5 - 5.1 mmol/L 4.5   Chloride      98 - 112 mmol/L 103   Carbon Dioxide, Total      21.0 - 32.0 mmol/L 32.0   ANION GAP      0 - 18 mmol/L 3   BUN      7 - 18 mg/dL 21 (H)   CREATININE      0.55 - 1.02 mg/dL 0.88   BUN/CREAT Ratio      10.0 - 20.0 23.9 (H)   CALCIUM      8.5 - 10.1 mg/dL 9.5   CALCULATED OSMOLALITY      275 - 295 mOsm/kg 288   eGFR NON-AFR. AMERICAN      >=60 69   eGFR AFRICAN AMERICAN      >=60 80   AST (SGOT)      15 - 37 U/L 15   ALT (SGPT)      13 - 56 U/L 26   ALKALINE PHOSPHATASE      50 - 130 U/L 78   Total Bilirubin      0.1 - 2.0 mg/dL 0.3   TOTAL PROTEIN      6.4 - 8.2 g/dL 8.3 (H)   Albumin      3.4 - 5.0 g/dL 3.6     Past Medical History:  Past Medical History:   Diagnosis Date    Diarrhea, unspecified     Fatigue      high dose of gabapentin    Hearing loss     wear hearing aids    High cholesterol     I take meds for this    Hypercholesteremia     Hyperlipidemia     Presence of other cardiac implants and grafts     pessary    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 Pain started 2015 pain not severe. Pain starts to right cheek travel to the top of the head.  It is a burning sharp sensation.  Pt had a dental procedure in 2015 to be fitted for new dentures and bitting down on cold mold she began to then have pain.  Triggers talking, eating, drinking, touch to face, brushing teeth, and cold wind hitting her face.     Wears glasses     glasses      HL     Past Surgical History:  Past Surgical History:   Procedure Laterality Date    COLONOSCOPY  2013?    CT CYBERKNIFE RAD PLANNING (NO IV) EM (CPT=77014) Right 10/30/2020    for trigeminal neuralgia- 75Gy in single fraction    CYST REMOVAL  1981    DAJA LOCALIZATION WIRE 1 SITE RIGHT (CPT=19281)  1981    SURGERY - EXTERNAL      acl surgery    TONSILLECTOMY  long long time ago       Family History:  family history includes Cancer (age of onset: 68) in her mother; No Known Problems in her daughter, maternal grandfather, maternal grandmother, paternal grandfather, paternal grandmother, son, and son.    Social History:   reports that she quit smoking about 14 years ago. Her smoking use included cigarettes. She has a 43.75 pack-year smoking history. She has never used smokeless tobacco. She reports current alcohol use. She reports that she does not use drugs.    Allergies:  Allergies   Allergen Reactions    Penicillins RASH     Occurred when she was a child, thinks its a rash       MEDICATIONS:    Current Outpatient Medications:     atorvastatin 20 MG Oral Tab, Take 1 tablet (20 mg total) by mouth  nightly., Disp: 90 tablet, Rfl: 0    levothyroxine 75 MCG Oral Tab, Take 1 tablet (75 mcg total) by mouth before breakfast., Disp: 90 tablet, Rfl: 1    Omega-3 Fatty Acids (FISH OIL) 600 MG Oral Cap, Take by mouth., Disp: , Rfl:     Calcium Carb-Cholecalciferol (CALCIUM 600+D3 OR), Take 2 tablets by mouth daily., Disp: , Rfl:     Cholecalciferol (VITAMIN D3) 25 MCG (1000 UT) Oral Cap, Take 1 tablet by mouth daily., Disp: , Rfl:     Multiple Vitamins-Minerals (MULTIVITAMIN OR), Take by mouth daily., Disp: , Rfl:       Review of Systems:   A comprehensive 10 point review of systems was completed.     Pertinent positives and negatives noted in the HPI.         PHYSICAL EXAM:   Neurologic Exam  Vitals  Vitals:    01/02/24 0812   BP: 118/58   Resp: 16     General Appearance: Patient is a 68 year old female in no acute distress  Cardiac: Normal rate & regular rhythm  Lungs: Clear to auscultation bilaterally  Skin: There are no rashes or other skin lesions.  Musculoskeletal: There is no scoliosis, or joint deformities  Neurologic examination:  Mental status: Patient is alert, attentive, and oriented x 3. Language is coherent and fluent without aphasia. Memory, comprehension and ability to follow commands were intact.   Cranial nerves II-XII: Optic discs were sharp. Pupils were round and reacted to light. Extraocular movements were full. There was no face, jaw, palate or tongue weakness or atrophy. Hearing was grossly intact. Shoulder shrug was normal.   Motor exam revealed normal muscle bulk and tone. No atrophy or fasciculations. Manual muscle testing revealed MRC grade 5/5 strength throughout including proximal and distal muscles of the arms and legs.  Deep tendon reflexes were 2 at the biceps, brachioradialis, triceps, knee jerk, and ankle jerk. Plantar responses were flexor bilaterally.   Sensory exam revealed normal light touch perception. Vibratory perception and proprioception were intact at the toes. Pinprick and  temperature were normal. Romberg sign was absent.  Complex motor skills revealed normal coordination. Finger-nose-finger intact.   Gait was narrow and stable, was able to walk on heels, toes and tandem without any difficulty.       ASSESSMENT/ACTIVE PROBLEM LIST:     Encounter Diagnosis   Name Primary?    Right trigeminal neuralgia Yes       Discussion/Plan:  S/p microvascular decompression by Dr. Suárez at [place], of right trigeminal nerve in 7/2023, doing well off medication at this time  Refractory trigeminal neuralgia- s/p cyberknife and rhizotomy, s/p tegretol, trileptal, gabapentin, now baclofen, and has had multiple side effects with medications  Continue to stay off gabapentin and baclofen  May have not had SE on baclofen  If has recurrent symptoms, would restart baclofen 10mg nightly     Requested Prescriptions      No prescriptions requested or ordered in this encounter          We discussed in depth regarding the diagnosis, prognosis, treatment. The patient was given ample opportunity to ask questions. All questions and concerns were addressed.      Susy Metcalf,   Neuromuscular and General Neurology  [facility]

## 2024-01-02 NOTE — PROGRESS NOTES
Pt states here for follow up on trigeminal neuralgia. Pt states doing well. Had surgery on July 26

## 2024-01-02 NOTE — PATIENT INSTRUCTIONS
Refill policies:    Allow 2-3 business days for refills; controlled substances may take longer.  Contact your pharmacy at least 5 days prior to running out of medication and have them send an electronic request or submit request through the “request refill” option in your [facility] account.  Refills are not addressed on weekends; covering physicians do not authorize routine medications on weekends.  No narcotics or controlled substances are refilled after noon on Fridays or by on call physicians.  By law, narcotics must be electronically prescribed.  A 30 day supply with no refills is the maximum allowed.  If your prescription is due for a refill, you may be due for a follow up appointment.  To best provide you care, patients receiving routine medications need to be seen at least once a year.  Patients receiving narcotic/controlled substance medications need to be seen at least once every 3 months.  In the event that your preferred pharmacy does not have the requested medication in stock (e.g. Backordered), it is your responsibility to find another pharmacy that has the requested medication available.  We will gladly send a new prescription to that pharmacy at your request.    Scheduling Tests:    If your physician has ordered radiology tests such as MRI or CT scans, please contact Central Scheduling at [phone] right away to schedule the test.  Once scheduled, the [facility] Centralized Referral Team will work with your insurance carrier to obtain pre-certification or prior authorization.  Depending on your insurance carrier, approval may take 3-10 days.  It is highly recommended patients assure they have received an authorization before having a test performed.  If test is done without insurance authorization, patient may be responsible for the entire amount billed.      Precertification and Prior Authorizations:  If your physician has recommended that you have a procedure or additional testing performed the [facility]  Centralized Referral Team will contact your insurance carrier to obtain pre-certification or prior authorization.    You are strongly encouraged to contact your insurance carrier to verify that your procedure/test has been approved and is a COVERED benefit.  Although the [facility] Centralized Referral Team does its due diligence, the insurance carrier gives the disclaimer that \"Although the procedure is authorized, this does not guarantee payment.\"    Ultimately the patient is responsible for payment.   Thank you for your understanding in this matter.  Paperwork Completion:  If you require FMLA or disability paperwork for your recovery, please make sure to either drop it off or have it faxed to our office at [phone]. Be sure the form has your name and date of birth on it.  The form will be faxed to our Forms Department and they will complete it for you.  There is a 25$ fee for all forms that need to be filled out.  Please be aware there is a 10-14 day turnaround time.  You will need to sign a release of information (ATUL) form if your paperwork does not come with one.  You may call the Forms Department with any questions at [phone].  Their fax number is [phone].

## 2024-03-18 NOTE — TELEPHONE ENCOUNTER
Pt is scheduled for her flu and pneumonia shot for   Future Appointments   Date Time Provider Christy Kenyon   10/29/2020  9:45 AM [facility] NURSE [facility]     Please advise No

## 2024-03-25 ENCOUNTER — TELEPHONE (OUTPATIENT)
Dept: INTERNAL MEDICINE CLINIC | Facility: CLINIC | Age: 69
End: 2024-03-25

## 2024-03-25 NOTE — TELEPHONE ENCOUNTER
[facility] - [place] [facility] - [address] AT PORTAL TO REGISTERED [facility] SITES, [phone], [phone]     Pt stated she needs a refill of the below medication and needs it to go through mail order pharm.  Pt stated a local [facility] wanted to fill the script but she didn't pick it up      atorvastatin 20 MG Oral Tab 90 tablet 0 12/28/2023 --    Sig - Route: Take 1 tablet (20 mg total) by mouth nightly. - Oral       patient

## 2024-03-27 DIAGNOSIS — E78.00 HIGH CHOLESTEROL: ICD-10-CM

## 2024-03-28 RX ORDER — ATORVASTATIN CALCIUM 20 MG/1
20 TABLET, FILM COATED ORAL NIGHTLY
Qty: 90 TABLET | Refills: 0 | Status: SHIPPED | OUTPATIENT
Start: 2024-03-28 | End: 2024-06-28

## 2024-03-28 NOTE — TELEPHONE ENCOUNTER
Last cpe 6/20/23. Not due for appt until June.      atorvastatin 20 MG Oral Tab         Sig: Take 1 tablet (20 mg total) by mouth nightly.    Disp: 90 tablet    Refills: 0    Start: 3/27/2024    Class: Normal    Non-formulary For: High cholesterol    Last ordered: 3 months ago (12/28/2023) by Mathew Juarez MD    Cholesterol Medication Protocol [id number]/27/2024 06:28 PM   Protocol Details ALT < 80    ALT resulted within past year    Lipid panel within past 12 months    In person appointment or virtual visit in the past 12 mos or appointment in next 3 mos      To be filled at: [facility] - [place] - [address] [facility] AT Portal to Registered [facility] Sites, [phone], [phone]

## 2024-04-09 DIAGNOSIS — E03.9 HYPOTHYROIDISM, UNSPECIFIED TYPE: ICD-10-CM

## 2024-04-10 RX ORDER — LEVOTHYROXINE SODIUM 0.07 MG/1
75 TABLET ORAL
Qty: 90 TABLET | Refills: 0 | Status: SHIPPED | OUTPATIENT
Start: 2024-04-10

## 2024-04-17 ENCOUNTER — HOSPITAL ENCOUNTER (OUTPATIENT)
Dept: NUCLEAR MEDICINE | Facility: HOSPITAL | Age: 69
Discharge: HOME | End: 2024-04-17
Attending: INTERNAL MEDICINE
Payer: MEDICARE

## 2024-04-17 ENCOUNTER — LAB ENCOUNTER (OUTPATIENT)
Dept: LAB | Age: 69
End: 2024-04-17
Attending: INTERNAL MEDICINE
Payer: MEDICARE

## 2024-04-17 DIAGNOSIS — D47.2 SMOLDERING MYELOMA: ICD-10-CM

## 2024-04-17 DIAGNOSIS — R77.1 ABNORMALITY OF GLOBULIN: ICD-10-CM

## 2024-04-17 LAB
ALBUMIN SERPL-MCNC: 3.5 G/DL (ref 3.4–5)
ALBUMIN/GLOB SERPL: 0.7 {RATIO} (ref 1–2)
ALP LIVER SERPL-CCNC: 78 U/L
ALT SERPL-CCNC: 25 U/L
ANION GAP SERPL CALC-SCNC: 2 MMOL/L (ref 0–18)
AST SERPL-CCNC: 20 U/L (ref 15–37)
BASOPHILS # BLD AUTO: 0.05 X10(3) UL (ref 0–0.2)
BASOPHILS NFR BLD AUTO: 0.8 %
BILIRUB SERPL-MCNC: 0.5 MG/DL (ref 0.1–2)
BUN BLD-MCNC: 19 MG/DL (ref 9–23)
CALCIUM BLD-MCNC: 9.8 MG/DL (ref 8.5–10.1)
CHLORIDE SERPL-SCNC: 107 MMOL/L (ref 98–112)
CO2 SERPL-SCNC: 30 MMOL/L (ref 21–32)
CREAT BLD-MCNC: 1.01 MG/DL
EGFRCR SERPLBLD CKD-EPI 2021: 60 ML/MIN/1.73M2 (ref 60–?)
EOSINOPHIL # BLD AUTO: 0.1 X10(3) UL (ref 0–0.7)
EOSINOPHIL NFR BLD AUTO: 1.5 %
ERYTHROCYTE [DISTWIDTH] IN BLOOD BY AUTOMATED COUNT: 12.5 %
FASTING STATUS PATIENT QL REPORTED: YES
GLOBULIN PLAS-MCNC: 5.2 G/DL (ref 2.8–4.4)
GLUCOSE BLD-MCNC: 105 MG/DL (ref 70–99)
GLUCOSE BLD-MCNC: 98 MG/DL (ref 70–99)
HCT VFR BLD AUTO: 40.3 %
HGB BLD-MCNC: 13.5 G/DL
IGA SERPL-MCNC: 218 MG/DL (ref 70–312)
IGM SERPL-MCNC: 109 MG/DL (ref 43–279)
IMM GRANULOCYTES # BLD AUTO: 0.01 X10(3) UL (ref 0–1)
IMM GRANULOCYTES NFR BLD: 0.2 %
IMMUNOGLOBULIN PNL SER-MCNC: 2300 MG/DL (ref 791–1643)
LYMPHOCYTES # BLD AUTO: 2 X10(3) UL (ref 1–4)
LYMPHOCYTES NFR BLD AUTO: 30.1 %
MCH RBC QN AUTO: 32.1 PG (ref 26–34)
MCHC RBC AUTO-ENTMCNC: 33.5 G/DL (ref 31–37)
MCV RBC AUTO: 95.7 FL
MONOCYTES # BLD AUTO: 0.45 X10(3) UL (ref 0.1–1)
MONOCYTES NFR BLD AUTO: 6.8 %
NEUTROPHILS # BLD AUTO: 4.04 X10 (3) UL (ref 1.5–7.7)
NEUTROPHILS # BLD AUTO: 4.04 X10(3) UL (ref 1.5–7.7)
NEUTROPHILS NFR BLD AUTO: 60.6 %
OSMOLALITY SERPL CALC.SUM OF ELEC: 290 MOSM/KG (ref 275–295)
PLATELET # BLD AUTO: 330 10(3)UL (ref 150–450)
POTASSIUM SERPL-SCNC: 4.3 MMOL/L (ref 3.5–5.1)
PROT SERPL-MCNC: 8.7 G/DL (ref 6.4–8.2)
RBC # BLD AUTO: 4.21 X10(6)UL
SODIUM SERPL-SCNC: 139 MMOL/L (ref 136–145)
SPECIMEN VOL UR: 2500 ML
WBC # BLD AUTO: 6.7 X10(3) UL (ref 4–11)

## 2024-04-17 PROCEDURE — 86334 IMMUNOFIX E-PHORESIS SERUM: CPT

## 2024-04-17 PROCEDURE — 78816 PET IMAGE W/CT FULL BODY: CPT | Performed by: INTERNAL MEDICINE

## 2024-04-17 PROCEDURE — 85025 COMPLETE CBC W/AUTO DIFF WBC: CPT

## 2024-04-17 PROCEDURE — 80053 COMPREHEN METABOLIC PANEL: CPT

## 2024-04-17 PROCEDURE — 84165 PROTEIN E-PHORESIS SERUM: CPT

## 2024-04-17 PROCEDURE — 83521 IG LIGHT CHAINS FREE EACH: CPT

## 2024-04-17 PROCEDURE — 36415 COLL VENOUS BLD VENIPUNCTURE: CPT

## 2024-04-17 PROCEDURE — 82962 GLUCOSE BLOOD TEST: CPT

## 2024-04-17 PROCEDURE — 84166 PROTEIN E-PHORESIS/URINE/CSF: CPT

## 2024-04-17 PROCEDURE — 84156 ASSAY OF PROTEIN URINE: CPT

## 2024-04-17 PROCEDURE — 86335 IMMUNFIX E-PHORSIS/URINE/CSF: CPT

## 2024-04-17 PROCEDURE — 82784 ASSAY IGA/IGD/IGG/IGM EACH: CPT

## 2024-04-23 LAB
ALBUMIN SERPL ELPH-MCNC: 4.02 G/DL (ref 3.75–5.21)
ALBUMIN/GLOB SERPL: 1.04 {RATIO} (ref 1–2)
ALPHA1 GLOB SERPL ELPH-MCNC: 0.3 G/DL (ref 0.19–0.46)
ALPHA2 GLOB SERPL ELPH-MCNC: 0.66 G/DL (ref 0.48–1.05)
B-GLOBULIN SERPL ELPH-MCNC: 0.86 G/DL (ref 0.68–1.23)
GAMMA GLOB SERPL ELPH-MCNC: 2.05 G/DL (ref 0.62–1.7)
KAPPA LC FREE SER-MCNC: 18.81 MG/DL (ref 0.33–1.94)
KAPPA LC FREE/LAMBDA FREE SER NEPH: 12.89 {RATIO} (ref 0.26–1.65)
LAMBDA LC FREE SERPL-MCNC: 1.46 MG/DL (ref 0.57–2.63)
M PROTEIN 1 SERPL ELPH-MCNC: 1.58 G/DL (ref ?–0)
PROT SERPL-MCNC: 7.9 G/DL (ref 5.7–8.2)

## 2024-04-24 ENCOUNTER — OFFICE VISIT (OUTPATIENT)
Dept: HEMATOLOGY/ONCOLOGY | Facility: HOSPITAL | Age: 69
End: 2024-04-24
Attending: INTERNAL MEDICINE
Payer: MEDICARE

## 2024-04-24 VITALS
WEIGHT: 146 LBS | DIASTOLIC BLOOD PRESSURE: 73 MMHG | SYSTOLIC BLOOD PRESSURE: 110 MMHG | RESPIRATION RATE: 18 BRPM | OXYGEN SATURATION: 97 % | HEIGHT: 66.69 IN | TEMPERATURE: 97 F | HEART RATE: 77 BPM | BODY MASS INDEX: 23.19 KG/M2

## 2024-04-24 DIAGNOSIS — D47.2 SMOLDERING MYELOMA: Primary | ICD-10-CM

## 2024-04-24 DIAGNOSIS — D84.81 IMMUNODEFICIENCY DUE TO CONDITIONS CLASSIFIED ELSEWHERE (HCC): ICD-10-CM

## 2024-04-24 PROCEDURE — G2211 COMPLEX E/M VISIT ADD ON: HCPCS | Performed by: INTERNAL MEDICINE

## 2024-04-24 PROCEDURE — 99214 OFFICE O/P EST MOD 30 MIN: CPT | Performed by: INTERNAL MEDICINE

## 2024-04-24 NOTE — PROGRESS NOTES
Hematology/Oncology Clinic Follow Up Visit    Patient Name: Nilesh Garcia  Medical Record Number: [id number]   YOB: 1955   PCP: Dr. Adam Schriedel  Other providers: Dr. Angus De Dios (liver), Dr. Kimberly Metcalf (neuro), Dr. Herbert Contreras (rad onc)    Reason for Consultation:  Nilesh Garcia was seen today for the diagnosis of smoldering myeloma    Hematologic History:  *Smoldering myeloma, IgG kappa  -3/5/2020- elevated transaminases and total protein prompted evaluation of a monoclonal protein study which showed IgG kappa M spike 1.54 g/dL.  Total IgG 2300mg/dL.   -4/7/20- normal CBC, CMP, LDH and B2M  -4/20/20- PET/CT- CONCLUSION:  No abnormal FDG activity.  -4/21/20- bmbx-mildly hypercellular bone marrow showing features consistent with plasma cell neoplasm.  Monotypic plasma cells comprise up to 12% of all nucleated cells.  Normal karyotype and myeloma FISH panel.    -4/12/21- PET/CT- CONCLUSION:  No abnormal FDG activity.  -4/22/22- PET/CT- CONCLUSION:  No abnormal FDG activity.  -4/19/23- MRI whole body- Enhancement is noted superior endplate of L3 vertebral body is noted favored to be reactive enhancement and a benign Schmorl's node.   -4/17/24- PET/CT- CONCLUSION:  No abnormal FDG activity.    ==============================  Interval events: Presents for follow-up.  She reports feeling well overall without any new issues.      Since she underwent microvascular decompressive surgery for trigeminal neuralgia on 7/26/23 she has had marked improvement in the trigeminal neuralgia pain.  She has been able to wean off gabapentin completely as of 11/29/23.  She remains pain-free and is no longer taking any medicines for this.  Since she has been off gabapentin her energy been improved.  No other neurologic changes or neuropathy.      She denies any new aches or pains.  No bowel or bladder changes.  She denies any recent fevers, infections, or drenching night sweats.  No bleeding.   She denies any  dyspnea or chest pain.      She received an influenza, RSV, and updated COVID-19 vaccine in the fall.    Past Medical History:  Past Medical History:    Diarrhea, unspecified    Fatigue    high dose of gabapentin    Hearing loss    wear hearing aids    High cholesterol    I take meds for this    Hypercholesteremia    Hyperlipidemia    Presence of other cardiac implants and grafts    pessary    Smoldering myeloma    Stool incontinence    Syncope    Syncope and collapse    Thyroid disease    I take meds for this - Levothyroxin    Trigeminal neuralgia    Right side of face intermittent pain. Pain started 2015 pain not severe. Pain starts to right cheek travel to the top of the head.  It is a burning sharp sensation.  Pt had a dental procedure in 2015 to be fitted for new dentures and bitting down on cold mold she began to then have pain.  Triggers talking, eating, drinking, touch to face, brushing teeth, and cold wind hitting her face.     Wears glasses    glasses     Past Surgical History:   Procedure Laterality Date    Colonoscopy  2013?    Ct cyberknife rad planning (no iv) em (cpt=77014) Right 10/30/2020    for trigeminal neuralgia- 75Gy in single fraction    Cyst removal  1981    [facility] localization wire 1 site right (cpt=19281)  1981    Surgery - external      acl surgery    Tonsillectomy  long long time ago     Home Medications:   levothyroxine 75 MCG Oral Tab TAKE 1 TABLET BEFORE       BREAKFAST 90 tablet 0    atorvastatin 20 MG Oral Tab Take 1 tablet (20 mg total) by mouth nightly. 90 tablet 0    Omega-3 Fatty Acids (FISH OIL) 600 MG Oral Cap Take by mouth.      Calcium Carb-Cholecalciferol (CALCIUM 600+D3 OR) Take 2 tablets by mouth daily.      Cholecalciferol (VITAMIN D3) 25 MCG (1000 UT) Oral Cap Take 1 tablet by mouth daily.      Multiple Vitamins-Minerals (MULTIVITAMIN OR) Take by mouth daily.       Allergies:   Allergies   Allergen Reactions    Penicillins RASH     Occurred when she was a child, thinks its  a rash       Psychosocial History:  Social History     Social History Narrative    .  Has 3 adult children.  5 grandchildren who live locally.  Retired high school guidance counselor     Social History     Socioeconomic History    Marital status:    Tobacco Use    Smoking status: Former     Current packs/day: 0.00     Average packs/day: 1.3 packs/day for 35.0 years (43.8 ttl pk-yrs)     Types: Cigarettes     Start date:      Quit date: 2010     Years since quittin.3    Smokeless tobacco: Never   Vaping Use    Vaping status: Never Used   Substance and Sexual Activity    Alcohol use: Yes     Comment: I do drink socially on occasion - not much    Drug use: Never   Other Topics Concern    Caffeine Concern Yes     Comment: 1 serving/day    Exercise Yes     Comment: walking   Social History Narrative    .  Has 3 adult children.  5 grandchildren who live locally.  Retired high school guidance counselor     Social Determinants of Health     Food Insecurity: No Food Insecurity (3/8/2023)    [facility]    Hunger Vital Sign     Worried About Running Out of Food in the Last Year: Never true     Ran Out of Food in the Last Year: Never true   Transportation Needs: No Transportation Needs (3/8/2023)    [facility]    PRAPARE - Transportation     Lack of Transportation (Medical): No     Lack of Transportation (Non-Medical): No    [facility]    Housing Stability     Family Medical History:  Family History   Problem Relation Age of Onset    Cancer Mother 68        Lung, +smoker    No Known Problems Daughter     No Known Problems Son     No Known Problems Maternal Grandmother     No Known Problems Maternal Grandfather     No Known Problems Paternal Grandmother     No Known Problems Paternal Grandfather     No Known  Problems Son      Review of Systems:  A 10-point ROS was done with pertinent positives and negative per the HPI    Vital Signs:  Height: 169.4 cm (5' 6.69\") (04/24 0923)  Weight: 66.2 kg (146 lb) (04/24 0923)  BSA (Calculated - sq m): 1.76 sq meters (04/24 0923)  Pulse: 77 (04/24 0923)  BP: 110/73 (04/24 0923)  Temp: 97.4 °F (36.3 °C) (04/24 0923)  Do Not Use - Resp Rate: --  SpO2: 97 % (04/24 0923)    Wt Readings from Last 6 Encounters:   04/24/24 66.2 kg (146 lb)   01/02/24 68.1 kg (150 lb 3.2 oz)   10/26/23 69.9 kg (154 lb)   07/20/23 69.9 kg (154 lb)   07/10/23 68.5 kg (151 lb)   06/20/23 68.8 kg (151 lb 9.6 oz)     ECOG PS: 0    Physical Examination:  General: Patient is alert and oriented, not in acute distress  Psych: Mood and affect are appropriate  Eyes: EOMI  ENT: Oropharynx is clear  CV: Regular rate and rhythm, no murmurs, no LE edema  Respiratory: Lungs clear to auscultation bilaterally  GI/Abd: Soft, non-tender with normoactive bowel sounds, no hepatosplenomegaly  Neurological: Grossly intact   Lymphatics: No palpable lymphadenopathy  Skin: no rashes or petechiae    Laboratory:  Lab Results   Component Value Date    WBC 6.7 04/17/2024    WBC 6.0 10/18/2023    WBC 6.1 07/10/2023    HGB 13.5 04/17/2024    HGB 13.2 10/18/2023    HGB 12.4 07/10/2023    HCT 40.3 04/17/2024    MCV 95.7 04/17/2024    MCH 32.1 04/17/2024    MCHC 33.5 04/17/2024    RDW 12.5 04/17/2024    .0 04/17/2024    .0 10/18/2023    .0 07/10/2023     Lab Results   Component Value Date    GLU 98 04/17/2024    BUN 19 04/17/2024    BUNCREA 23.5 (H) 05/24/2021    CREATSERUM 1.01 04/17/2024    CREATSERUM 1.17 (H) 10/18/2023    CREATSERUM 1.02 07/10/2023    ANIONGAP 2 04/17/2024    GFR 65 07/28/2016    GFRNAA 60 06/09/2022    GFRAA 69 06/09/2022    CA 9.8 04/17/2024    OSMOCALC 290 04/17/2024    ALKPHO 78 04/17/2024    AST 20 04/17/2024    ALT 25 04/17/2024    BILT 0.5 04/17/2024    TP 8.7 (H) 04/17/2024    TP 7.9 04/17/2024     ALB 3.5 04/17/2024    ALB 4.02 04/17/2024    GLOBULIN 5.2 (H) 04/17/2024     04/17/2024    K 4.3 04/17/2024     04/17/2024    CO2 30.0 04/17/2024     Lab Results   Component Value Date    PTT 25.6 07/10/2023    INR 0.91 07/10/2023     Lab Results   Component Value Date    ELECTMS1 1.58 (H) 04/17/2024    ELECTMS1 1.62 (H) 10/18/2023    ELECTMS1 1.57 (H) 04/19/2023    ELECTMS1 1.55 (H) 10/20/2022    ELECTMS1 1.61 (H) 04/20/2022    ELECTMS1 1.48 (H) 10/11/2021    ELECTMS1 1.62 (H) 04/12/2021    ELECTMS1 1.57 (H) 12/30/2020    ELECTMS1 1.49 (H) 10/01/2020    ELECTMS1 1.55 (H) 07/11/2020    ELECTMS1 1.54 (H) 03/05/2020     Lab Results   Component Value Date    KAPPALTCHN 18.812 (H) 04/17/2024    KAPPALTCHN 15.451 (H) 10/18/2023    KAPPALTCHN 13.346 (H) 04/19/2023    KAPPALTCHN 15.546 (H) 10/20/2022    KAPPALTCHN 11.215 (H) 04/20/2022    KAPPALTCHN 15.769 (H) 10/11/2021    KAPPALTCHN 7.758 (H) 04/12/2021    KAPPALTCHN 8.847 (H) 12/30/2020    KAPPALTCHN 8.244 (H) 10/01/2020    KAPPALTCHN 5.153 (H) 07/11/2020    KAPPALTCHN 7.731 (H) 03/05/2020      Lab Results   Component Value Date    LAMBDALTCH 1.459 04/17/2024    LAMBDALTCH 1.531 10/18/2023    LAMBDALTCH 1.482 04/19/2023    LAMBDALTCH 1.781 10/20/2022    LAMBDALTCH 1.563 04/20/2022    LAMBDALTCH 1.845 10/11/2021    LAMBDALTCH 1.600 04/12/2021    LAMBDALTCH 1.947 12/30/2020    LAMBDALTCH 1.363 10/01/2020    LAMBDALTCH 1.335 07/11/2020    LAMBDALTCH 1.575 03/05/2020     Lab Results   Component Value Date    KAPLAMRATIO 12.89 (H) 04/17/2024    KAPLAMRATIO 10.09 (H) 10/18/2023    KAPLAMRATIO 9.01 (H) 04/19/2023    KAPLAMRATIO 8.73 (H) 10/20/2022    KAPLAMRATIO 7.18 (H) 04/20/2022    KAPLAMRATIO 8.55 (H) 10/11/2021    KAPLAMRATIO 4.85 (H) 04/12/2021    KAPLAMRATIO 4.54 (H) 12/30/2020    KAPLAMRATIO 6.05 (H) 10/01/2020    KAPLAMRATIO 3.86 (H) 07/11/2020    KAPLAMRATIO 1.2 04/21/2020    KAPLAMRATIO 4.91 (H) 03/05/2020     Lab Results   Component Value Date    IGG  2,300 (H) 04/17/2024    IGG 2,410 (H) 10/18/2023    IGG 2,190 (H) 04/19/2023    IGG 2,260 (H) 10/20/2022    IGG 2,270 (H) 04/20/2022    IGG 2,070 (H) 10/11/2021    IGG 2,270 (H) 04/12/2021    IGG 2,340 (H) 12/30/2020    IGG 2,130 (H) 10/01/2020    IGG 2,150 (H) 07/11/2020    IGG 2,300 (H) 03/05/2020     Lab Results   Component Value Date     04/20/2022     07/11/2020     04/07/2020     Lab Results   Component Value Date    B2MICR 0.203 04/20/2022    B2MICR 0.211 07/11/2020    B2MICR 0.205 04/07/2020     Component      Latest Ref Rng & Units 4/10/2020   Total Protein, Urine      <=150 mg/d 71 mg/d   FREE URINARY KAPPA LIGHT CHAIN      0.00 - 32.90 mg/L 24.57   FREE URINARY KAPPA EXCRETION/D      mg/d 71.25   FREE UR LAMBDA LIGHT CHAIN      0.00 - 3.79 mg/L <0.74   FREE UR LAMBDA EXCRETION/DAY      mg/d  Unable to quantitate free light chain excretion per day.   MOLLY INTERPRETATION       Urine is POSITIVE for monoclonal Free Kappa Light chains (Bence Steele Protein). Urine MOLLY shows a monoclonal IgG heavy chain with associated kappa light chain and excess monoclonal free kappa light chains.    TOTAL VOLUME      mL 2900   HOURS COLLECTED      hr 24     Component      Latest Ref Rng & Units 4/19/2022   PROTEIN URINE CALC      <149.1 mg/24 hr 235.0 (H)   Urine Volume 24Hr      mL 2,350   URINE BENCE STEELE PROTEIN 24HR       24-hour urine concentrated 50X demonstrates the presence of free Monoclonal Kappa Light Chains (Bence Steele Protein).  The free monoclonal light chain represents 0.01 grams. Whole molecule IgG Kappa is also detected.     Component      Latest Ref Rng 4/19/2023   PROTEIN URINE CALC      <149.1 mg/24 hr 167.2 (H)    Urine Volume 24Hr      mL 2,200    URINE BENCE STEELE PROTEIN 24HR 24-hour urine concentrated 50X demonstrates the presence of free Monoclonal Kappa Light Chains (Bence Steele Protein).  The free monoclonal light chain represents 0.01 grams. Whole molecule IgG Kappa is also  detected.      4/17/24: 24-hour urine concentrated 50X demonstrates the presence of trace amounts of Free Kappa Monoclonal Light Chains (Bence Steele Protein). Unable to quantitate the 24-hour Bence Steele Protein due to the small amount of free light chains present.     Imaging:    DEXA 1/30/19: Osteopenia with lumbar T score -1.9, total hip T score -1.7, femoral neck T score -0.8.    Impression & Plan:     *Smoldering myeloma  -Based on BMPC >10% without any clinical features of myeloma (negative PET/CT scan, no CRAB criteria)  -We discussed that no treatment is indicated at this time but requires ongoing surveillance.  Based on the [facility] 20/20/20 risk stratification model at time of bmbx she has 0 points= low risk (BMPC<20%, mspike <2 and sFLC ratio <20)-  This predicts a 2yr PFS of 90%.   -Her most recent labs are stable from prior.  Whole-body MRI shows a likely benign spine lesion, no convincing evidence of myeloma.    -will continue to monitor:   -Repeat CBC, CMP, quantitative immunoglobulins, monoclonal protein study in 6 months    -repeat 24-hour urine collection for Bence-Steele proteins annually (due next 4/2025)   -whole body MRI or PET/CT annually (she prefers PET scans)- we will plan for PET/CT scan next in 4/2025  -Based on smoldering myeloma diagnosis, she has a degree of immunodeficiency and therefore I have recommended the importance of staying up-to-date with vaccinations.  She already received an influenza, RSV, and updated COVID-19 vaccine fall 2023.  I recommended she get an updated COVID-19 vaccine booster at this time per CDC recommendations.      *Trigeminal neuralgia  -Unrelated to underlying smoldering myeloma or monoclonal gammopathy.  -s/p right V2 Rhizotomy at [facility] 5/16/22 without significant benefit, then microvascular decompression surgery on 7/26/23 with complete resolution of pain.  Has been able to completely wean off gabapentin.      RTC in 6 months    Jeffy Aguiar  MD  Hematology/Medical Oncology  [facility]

## 2024-04-24 NOTE — PROGRESS NOTES
Patient here for 6 month for smoldering myeloma. Patient denies pain. Patient discontinue gabapentin 11/29/23 with no stated issues. Patient completed  PET scan on 4/17/24 and ordered labs. Patient states that she is feeling good, with good appetite and adequate fluid intake. Patient is accompanied by .     Education Record    Learner:  Patient and Spouse    Disease / Diagnosis: smoldering myeloma     Barriers / Limitations:  None   Comments:    Method:  Discussion   Comments:    General Topics:  Medication, Side effects and symptom management, and Plan of care reviewed   Comments:    Outcome:  Shows understanding   Comments:

## 2024-04-25 PROBLEM — D84.81 IMMUNODEFICIENCY DUE TO CONDITIONS CLASSIFIED ELSEWHERE (HCC): Status: ACTIVE | Noted: 2024-04-25

## 2024-05-24 ENCOUNTER — HOSPITAL ENCOUNTER (OUTPATIENT)
Dept: MAMMOGRAPHY | Age: 69
Discharge: HOME | End: 2024-05-24
Attending: FAMILY MEDICINE

## 2024-05-24 DIAGNOSIS — Z12.31 ENCOUNTER FOR SCREENING MAMMOGRAM FOR MALIGNANT NEOPLASM OF BREAST: ICD-10-CM

## 2024-05-24 PROCEDURE — 77067 SCR MAMMO BI INCL CAD: CPT | Performed by: FAMILY MEDICINE

## 2024-05-24 PROCEDURE — 77063 BREAST TOMOSYNTHESIS BI: CPT | Performed by: FAMILY MEDICINE

## 2024-06-04 ENCOUNTER — HOSPITAL ENCOUNTER (OUTPATIENT)
Dept: MAMMOGRAPHY | Age: 69
Discharge: HOME | End: 2024-06-04
Attending: FAMILY MEDICINE
Payer: MEDICARE

## 2024-06-04 ENCOUNTER — HOSPITAL ENCOUNTER (OUTPATIENT)
Dept: ULTRASOUND IMAGING | Age: 69
Discharge: HOME | End: 2024-06-04
Attending: FAMILY MEDICINE
Payer: MEDICARE

## 2024-06-04 DIAGNOSIS — R92.2 INCONCLUSIVE MAMMOGRAM: ICD-10-CM

## 2024-06-04 PROCEDURE — 77061 BREAST TOMOSYNTHESIS UNI: CPT | Performed by: FAMILY MEDICINE

## 2024-06-04 PROCEDURE — 76642 ULTRASOUND BREAST LIMITED: CPT | Performed by: FAMILY MEDICINE

## 2024-06-04 PROCEDURE — 77065 DX MAMMO INCL CAD UNI: CPT | Performed by: FAMILY MEDICINE

## 2024-06-17 ENCOUNTER — LAB ENCOUNTER (OUTPATIENT)
Dept: LAB | Age: 69
End: 2024-06-17
Attending: FAMILY MEDICINE

## 2024-06-17 ENCOUNTER — TELEPHONE (OUTPATIENT)
Dept: INTERNAL MEDICINE CLINIC | Facility: CLINIC | Age: 69
End: 2024-06-17

## 2024-06-17 DIAGNOSIS — E03.9 HYPOTHYROIDISM, UNSPECIFIED TYPE: ICD-10-CM

## 2024-06-17 DIAGNOSIS — E03.9 HYPOTHYROIDISM, UNSPECIFIED TYPE: Primary | ICD-10-CM

## 2024-06-17 DIAGNOSIS — K76.0 FATTY LIVER: ICD-10-CM

## 2024-06-17 DIAGNOSIS — D47.2 SMOLDERING MYELOMA: ICD-10-CM

## 2024-06-17 DIAGNOSIS — E78.00 HIGH CHOLESTEROL: ICD-10-CM

## 2024-06-17 DIAGNOSIS — Z00.00 ENCOUNTER FOR ANNUAL HEALTH EXAMINATION: ICD-10-CM

## 2024-06-17 LAB
ALBUMIN SERPL-MCNC: 3.4 G/DL (ref 3.4–5)
ALBUMIN/GLOB SERPL: 0.7 {RATIO} (ref 1–2)
ALP LIVER SERPL-CCNC: 89 U/L
ALT SERPL-CCNC: 26 U/L
ANION GAP SERPL CALC-SCNC: 4 MMOL/L (ref 0–18)
AST SERPL-CCNC: 19 U/L (ref 15–37)
BASOPHILS # BLD AUTO: 0.07 X10(3) UL (ref 0–0.2)
BASOPHILS NFR BLD AUTO: 1.4 %
BILIRUB SERPL-MCNC: 0.6 MG/DL (ref 0.1–2)
BUN BLD-MCNC: 19 MG/DL (ref 9–23)
CALCIUM BLD-MCNC: 9.2 MG/DL (ref 8.5–10.1)
CHLORIDE SERPL-SCNC: 108 MMOL/L (ref 98–112)
CHOLEST SERPL-MCNC: 182 MG/DL (ref ?–200)
CO2 SERPL-SCNC: 27 MMOL/L (ref 21–32)
CREAT BLD-MCNC: 0.94 MG/DL
EGFRCR SERPLBLD CKD-EPI 2021: 66 ML/MIN/1.73M2 (ref 60–?)
EOSINOPHIL # BLD AUTO: 0.18 X10(3) UL (ref 0–0.7)
EOSINOPHIL NFR BLD AUTO: 3.6 %
ERYTHROCYTE [DISTWIDTH] IN BLOOD BY AUTOMATED COUNT: 12.7 %
FASTING PATIENT LIPID ANSWER: YES
FASTING STATUS PATIENT QL REPORTED: YES
GLOBULIN PLAS-MCNC: 5 G/DL (ref 2.8–4.4)
GLUCOSE BLD-MCNC: 100 MG/DL (ref 70–99)
HCT VFR BLD AUTO: 41.2 %
HDLC SERPL-MCNC: 55 MG/DL (ref 40–59)
HGB BLD-MCNC: 13.5 G/DL
IMM GRANULOCYTES # BLD AUTO: 0.01 X10(3) UL (ref 0–1)
IMM GRANULOCYTES NFR BLD: 0.2 %
LDLC SERPL CALC-MCNC: 100 MG/DL (ref ?–100)
LYMPHOCYTES # BLD AUTO: 2.12 X10(3) UL (ref 1–4)
LYMPHOCYTES NFR BLD AUTO: 42.4 %
MCH RBC QN AUTO: 32.3 PG (ref 26–34)
MCHC RBC AUTO-ENTMCNC: 32.8 G/DL (ref 31–37)
MCV RBC AUTO: 98.6 FL
MONOCYTES # BLD AUTO: 0.45 X10(3) UL (ref 0.1–1)
MONOCYTES NFR BLD AUTO: 9 %
NEUTROPHILS # BLD AUTO: 2.17 X10 (3) UL (ref 1.5–7.7)
NEUTROPHILS # BLD AUTO: 2.17 X10(3) UL (ref 1.5–7.7)
NEUTROPHILS NFR BLD AUTO: 43.4 %
NONHDLC SERPL-MCNC: 127 MG/DL (ref ?–130)
OSMOLALITY SERPL CALC.SUM OF ELEC: 290 MOSM/KG (ref 275–295)
PLATELET # BLD AUTO: 327 10(3)UL (ref 150–450)
POTASSIUM SERPL-SCNC: 4.8 MMOL/L (ref 3.5–5.1)
PROT SERPL-MCNC: 8.4 G/DL (ref 6.4–8.2)
RBC # BLD AUTO: 4.18 X10(6)UL
SODIUM SERPL-SCNC: 139 MMOL/L (ref 136–145)
TRIGL SERPL-MCNC: 157 MG/DL (ref 30–149)
TSI SER-ACNC: 1.77 MIU/ML (ref 0.36–3.74)
VLDLC SERPL CALC-MCNC: 26 MG/DL (ref 0–30)
WBC # BLD AUTO: 5 X10(3) UL (ref 4–11)

## 2024-06-17 PROCEDURE — 80053 COMPREHEN METABOLIC PANEL: CPT

## 2024-06-17 PROCEDURE — 36415 COLL VENOUS BLD VENIPUNCTURE: CPT

## 2024-06-17 PROCEDURE — 80061 LIPID PANEL: CPT

## 2024-06-17 PROCEDURE — 84443 ASSAY THYROID STIM HORMONE: CPT

## 2024-06-17 PROCEDURE — 85025 COMPLETE CBC W/AUTO DIFF WBC: CPT

## 2024-06-17 NOTE — TELEPHONE ENCOUNTER
From: Layo Duarte  To: Pamela Sutton DO  Sent: 12/21/2022 11:12 AM CST  Subject: Prescription refill    Dr Deidre Carrizales - I am out of 50 mg Nortriptyline. Would you please order a 3 month supply from the mail order location The RX # is [id number] Thank you much. Margarita Goode. 2200 6/16/24/dev

## 2024-06-17 NOTE — TELEPHONE ENCOUNTER
Call transferred to triage. Pt at lab to complete labs for AWV, no lab orders entered. Lab orders placed per protocol, pt appreciative.

## 2024-06-24 ENCOUNTER — OFFICE VISIT (OUTPATIENT)
Dept: INTERNAL MEDICINE CLINIC | Facility: CLINIC | Age: 69
End: 2024-06-24

## 2024-06-24 VITALS
SYSTOLIC BLOOD PRESSURE: 110 MMHG | HEART RATE: 83 BPM | RESPIRATION RATE: 18 BRPM | TEMPERATURE: 97 F | DIASTOLIC BLOOD PRESSURE: 66 MMHG | BODY MASS INDEX: 23.29 KG/M2 | WEIGHT: 146.63 LBS | OXYGEN SATURATION: 97 % | HEIGHT: 66.69 IN

## 2024-06-24 DIAGNOSIS — K76.0 FATTY LIVER: ICD-10-CM

## 2024-06-24 DIAGNOSIS — E78.00 HIGH CHOLESTEROL: ICD-10-CM

## 2024-06-24 DIAGNOSIS — Z00.00 ENCOUNTER FOR ANNUAL HEALTH EXAMINATION: Primary | ICD-10-CM

## 2024-06-24 DIAGNOSIS — N81.10 BADEN-WALKER GRADE 2 CYSTOCELE: ICD-10-CM

## 2024-06-24 DIAGNOSIS — G50.0 TRIGEMINAL NEURALGIA: ICD-10-CM

## 2024-06-24 DIAGNOSIS — D84.81 IMMUNODEFICIENCY DUE TO CONDITIONS CLASSIFIED ELSEWHERE (HCC): ICD-10-CM

## 2024-06-24 DIAGNOSIS — D47.2 SMOLDERING MYELOMA: ICD-10-CM

## 2024-06-24 DIAGNOSIS — K80.20 CALCULUS OF GALLBLADDER WITHOUT CHOLECYSTITIS WITHOUT OBSTRUCTION: ICD-10-CM

## 2024-06-24 DIAGNOSIS — K63.5 POLYP OF COLON, UNSPECIFIED PART OF COLON, UNSPECIFIED TYPE: ICD-10-CM

## 2024-06-24 DIAGNOSIS — Q18.1 CYST ON EAR: ICD-10-CM

## 2024-06-24 DIAGNOSIS — E03.9 HYPOTHYROIDISM, UNSPECIFIED TYPE: ICD-10-CM

## 2024-06-24 PROBLEM — R45.89 ANXIETY ABOUT HEALTH: Status: RESOLVED | Noted: 2020-07-16 | Resolved: 2024-06-24

## 2024-06-24 NOTE — PATIENT INSTRUCTIONS
Nilesh Garcia's SCREENING SCHEDULE   Tests on this list are recommended by your physician but may not be covered, or covered at this frequency, by your insurer.   Please check with your insurance carrier before scheduling to verify coverage.   PREVENTATIVE SERVICES FREQUENCY &  COVERAGE DETAILS LAST COMPLETION DATE   Diabetes Screening    Fasting Blood Sugar /  Glucose    One screening every 12 months if never tested or if previously tested but not diagnosed with pre-diabetes   One screening every 6 months if diagnosed with pre-diabetes Lab Results   Component Value Date     (H) 06/17/2024        Cardiovascular Disease Screening    Lipid Panel  Cholesterol  Lipoprotein (HDL)  Triglycerides Covered every 5 years for all Medicare beneficiaries without apparent signs or symptoms of cardiovascular disease Lab Results   Component Value Date    CHOLEST 182 06/17/2024    HDL 55 06/17/2024     (H) 06/17/2024    LDLD 95 10/15/2019    TRIG 157 (H) 06/17/2024         Electrocardiogram (EKG)   Covered if needed at Welcome to Medicare, and non-screening if indicated for medical reasons 07/10/2023      Ultrasound Screening for Abdominal Aortic Aneurysm (AAA) Covered once in a lifetime for one of the following risk factors   • Men who are 65-75 years old and have ever smoked   • Anyone with a family history -     Colorectal Cancer Screening  Covered for ages 50-85; only need ONE of the following:    Colonoscopy   Covered every 10 years    Covered every 2 years if patient is at high risk or previous colonoscopy was abnormal 06/08/2023    Health Maintenance   Topic Date Due   • Colorectal Cancer Screening  06/08/2024       Flexible Sigmoidoscopy   Covered every 4 years -    Fecal Occult Blood Test Covered annually -   Bone Density Screening    Bone density screening    Covered every 2 years after age 65 if diagnosed with risk of osteoporosis or estrogen deficiency.    Covered yearly for long-term glucocorticoid  medication use (Steroids) Last Dexa Scan:    XR DEXA BONE DENSITOMETRY (CPT=77080) 12/30/2019      No recommendations at this time   Pap and Pelvic    Pap   Covered every 2 years for women at normal risk; Annually if at high risk -  No recommendations at this time    Chlamydia Annually if high risk -  No recommendations at this time   Screening Mammogram    Mammogram     Recommend annually for all female patients aged 40 and older    One baseline mammogram covered for patients aged 35-39 06/04/2024    Health Maintenance   Topic Date Due   • Mammogram  06/04/2025       Immunizations    Influenza Covered once per flu season  Please get every year 09/19/2023  No recommendations at this time    Pneumococcal Each vaccine ([id number] & [id number]) covered once after 65 Prevnar 13: 10/29/2020    [id number]: 12/02/2019     No recommendations at this time    Hepatitis B One screening covered for patients with certain risk factors   -  No recommendations at this time    Tetanus Toxoid Not covered by Medicare Part B unless medically necessary (cut with metal); may be covered with your pharmacy prescription benefits -    Tetanus, Diptheria and Pertusis TD and TDaP Not covered by Medicare Part B -  No recommendations at this time    Zoster Not covered by Medicare Part B; may be covered with your pharmacy  prescription benefits -  Zoster Vaccines(1 of 2) Never done

## 2024-06-24 NOTE — PROGRESS NOTES
Subjective:   Nilesh Garcia is a 68 year old female who presents for a Medicare Subsequent Annual Wellness visit (Pt already had Initial Annual Wellness) and scheduled follow up of multiple significant but stable problems. Overall doing well. She has had some intermittent neuralgia since her MVD, but nothing severe. Following with Dr. Aguiar regularly and had recent PET. She is awaiting scheduling for her colonoscopy. She has a small cyst behind the right ear that she would like removed.     History/Other:   Fall Risk Assessment:   She has been screened for Falls and is low risk.      Cognitive Assessment:   She had a completely normal cognitive assessment - see flowsheet entries       Functional Ability/Status:   Nilesh Garcia has some abnormal functions as listed below:  She has Hearing problems based on screening of functional status.She has Vision problems based on screening of functional status.       Depression Screening (PHQ-2/PHQ-9): PHQ-2 SCORE: 0  , done 6/24/2024   Last [facility] Suicide Screening on 6/24/2024 was No Risk.     <5 minutes spent screening and counseling for depression    Advanced Directives:   She does have a Living Will but we do NOT have it on file in Epic.    She does have a POA but we do NOT have it on file in Epic.    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    Current Medications:  Outpatient Medications Marked as Taking for the 6/24/24 encounter (Office Visit) with Mathew Juarez MD   Medication Sig    levothyroxine 75 MCG Oral Tab TAKE 1 TABLET BEFORE       BREAKFAST    atorvastatin 20 MG Oral Tab Take 1 tablet (20 mg total) by  mouth nightly.    Calcium Carb-Cholecalciferol (CALCIUM 600+D3 OR) Take 2 tablets by mouth daily.    Cholecalciferol (VITAMIN D3) 25 MCG (1000 UT) Oral Cap Take 1 tablet by mouth daily.    Multiple Vitamins-Minerals (MULTIVITAMIN OR) Take by mouth daily.       Medical History:  She  has a past medical history of Diarrhea, unspecified, Fatigue, Hearing loss, High cholesterol, Hypercholesteremia, Hyperlipidemia, Presence of other cardiac implants and grafts, Smoldering myeloma (2020), Stool incontinence, Syncope, Syncope and collapse (2023), Thyroid disease, Trigeminal neuralgia (10/2019), and Wears glasses.  Surgical History:  She  has a past surgical history that includes cyst removal (); surgery - external; ct cyberknife rad planning (no iv) em (cpt=77014) (Right, 10/30/2020); eladia localization wire 1 site right (cpt=19281) (Right, ); colonoscopy (?); and tonsillectomy (long long time ago).   Family History:  Her family history includes Cancer (age of onset: 68) in her mother; No Known Problems in her daughter, maternal grandfather, maternal grandmother, paternal grandfather, paternal grandmother, son, and son.  Social History:  She  reports that she quit smoking about 14 years ago. Her smoking use included cigarettes. She started smoking about 49 years ago. She has a 43.8 pack-year smoking history. She has never used smokeless tobacco. She reports current alcohol use. She reports that she does not use drugs.    Tobacco:  She smoked tobacco in the past but quit greater than 12 months ago.  Social History     Tobacco Use   Smoking Status Former    Current packs/day: 0.00    Average packs/day: 1.3 packs/day for 35.0 years (43.8 ttl pk-yrs)    Types: Cigarettes    Start date:     Quit date: 2010    Years since quittin.4   Smokeless Tobacco Never          CAGE Alcohol Screen:   CAGE screening score of 0 on 2024, showing low risk of alcohol abuse.      Patient Care Team:  Ann  MD Mathew as PCP - General (Family Medicine)  Kimberly Metcalf DO as Consulting Physician (NEUROLOGY)  Herbert Contreras MD (Radiation Oncology)  Romero, Griselle, Alisson Donovan CMA    Review of Systems     Negative except as in HPI    Objective:   Physical Exam    /66   Pulse 83   Temp 97.2 °F (36.2 °C) (Temporal)   Resp 18   Ht 5' 6.69\" (1.694 m)   Wt 146 lb 9.6 oz (66.5 kg)   SpO2 97%   BMI 23.17 kg/m²  Estimated body mass index is 23.17 kg/m² as calculated from the following:    Height as of this encounter: 5' 6.69\" (1.694 m).    Weight as of this encounter: 146 lb 9.6 oz (66.5 kg).  GEN: Well appearing  HEENT: JENNIFER, ears clear. Small, nton tender subcutaneous cyst in the right posterior auricular region.   CV: RRR, no murmur  CHEST: CTAB  ABD: soft, NT    Medicare Hearing Assessment:   Hearing Screening    Time taken: 6/24/2024  9:59 AM  Entry User: Rylee Gregg CMA  Screening Method: Finger Rub  Finger Rub Result: Pass         Assessment & Plan:   Nilesh Garcia is a 68 year old female who presents for a Medicare Assessment.     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 Aguiar. Counts stable and continuing with whole body MRI or PET yearly.     High cholesterol  Continue current statin. Monitor lipid panel yearly    Hypothyroidism, unspecified type  TSH at goal on current levothyroxine dose.     Trigeminal neuralgia  S/P MVD and overall diong well. Now off all neuropathic medications.     Polyp of colon, unspecified part of colon, unspecified type  Colon CA screening  Planning colonoscopy in Oct.     Calculus of gallbladder without cholecystitis without obstruction  Asymptomatic, had previous gen surg evaluation. CTM    Fatty liver  Recent LFT's normal. CTM    [place]-Walker grade 2 cystocele  Stable following with urology    Cyst on ear  She will follow-up with her  dermatologist for possible excision    The patient indicates understanding of these issues and agrees to the plan.  Reinforced healthy diet, lifestyle, and exercise.      Return in 1 year (on 6/24/2025).     Mathew Juarez MD, 6/24/2024     Supplementary Documentation:   General Health:  In the past six months, have you lost more than 10 pounds without trying?: 2 - No  Has your appetite been poor?: No  Type of Diet: Balanced  How does the patient maintain a good energy level?: Daily Walks  How would you describe your daily physical activity?: Light  How would you describe your current health state?: Good  How do you maintain positive mental well-being?: Social Interaction;Games;Visiting Family  On a scale of 0 to 10, with 0 being no pain and 10 being severe pain, what is your pain level?: 0 - (None)  In the past six months, have you experienced urine leakage?: 0-No  At any time do you feel concerned for the safety/well-being of yourself and/or your children, in your home or elsewhere?: No  Have you had any immunizations at another office such as Influenza, Hepatitis B, Tetanus, or Pneumococcal?: No         Nilesh Garcia's SCREENING SCHEDULE   Tests on this list are recommended by your physician but may not be covered, or covered at this frequency, by your insurer.   Please check with your insurance carrier before scheduling to verify coverage.   PREVENTATIVE SERVICES FREQUENCY &  COVERAGE DETAILS LAST COMPLETION DATE   Diabetes Screening    Fasting Blood Sugar /  Glucose    One screening every 12 months if never tested or if previously tested but not diagnosed with pre-diabetes   One screening every 6 months if diagnosed with pre-diabetes Lab Results   Component Value Date     (H) 06/17/2024        Cardiovascular Disease Screening    Lipid Panel  Cholesterol  Lipoprotein (HDL)  Triglycerides Covered every 5 years for all Medicare beneficiaries without apparent signs or symptoms of cardiovascular disease  Lab Results   Component Value Date    CHOLEST 182 06/17/2024    HDL 55 06/17/2024     (H) 06/17/2024    LDLD 95 10/15/2019    TRIG 157 (H) 06/17/2024         Electrocardiogram (EKG)   Covered if needed at Welcome to Medicare, and non-screening if indicated for medical reasons 07/10/2023      Ultrasound Screening for Abdominal Aortic Aneurysm (AAA) Covered once in a lifetime for one of the following risk factors    Men who are 65-75 years old and have ever smoked    Anyone with a family history -     Colorectal Cancer Screening  Covered for ages 50-85; only need ONE of the following:    Colonoscopy   Covered every 10 years    Covered every 2 years if patient is at high risk or previous colonoscopy was abnormal 06/08/2023    Health Maintenance   Topic Date Due    Colorectal Cancer Screening  06/08/2024       Flexible Sigmoidoscopy   Covered every 4 years -    Fecal Occult Blood Test Covered annually -   Bone Density Screening    Bone density screening    Covered every 2 years after age 65 if diagnosed with risk of osteoporosis or estrogen deficiency.    Covered yearly for long-term glucocorticoid medication use (Steroids) Last Dexa Scan:    XR DEXA BONE DENSITOMETRY (CPT=77080) 12/30/2019      No recommendations at this time   Pap and Pelvic    Pap   Covered every 2 years for women at normal risk; Annually if at high risk -  No recommendations at this time    Chlamydia Annually if high risk -  No recommendations at this time   Screening Mammogram    Mammogram     Recommend annually for all female patients aged 40 and older    One baseline mammogram covered for patients aged 35-39 06/04/2024    Health Maintenance   Topic Date Due    Mammogram  06/04/2025       Immunizations    Influenza Covered once per flu season  Please get every year 09/19/2023  No recommendations at this time    Pneumococcal Each vaccine ([id number] & [id number]) covered once after 65 Prevnar 13: 10/29/2020    [id number]: 12/02/2019     No  recommendations at this time    Hepatitis B One screening covered for patients with certain risk factors   -  No recommendations at this time    Tetanus Toxoid Not covered by Medicare Part B unless medically necessary (cut with metal); may be covered with your pharmacy prescription benefits -    Tetanus, Diptheria and Pertusis TD and TDaP Not covered by Medicare Part B -  No recommendations at this time    Zoster Not covered by Medicare Part B; may be covered with your pharmacy  prescription benefits -  Zoster Vaccines(1 of 2) Never done

## 2024-06-25 DIAGNOSIS — E78.00 HIGH CHOLESTEROL: ICD-10-CM

## 2024-06-28 RX ORDER — ATORVASTATIN CALCIUM 20 MG/1
20 TABLET, FILM COATED ORAL NIGHTLY
Qty: 90 TABLET | Refills: 3 | Status: SHIPPED | OUTPATIENT
Start: 2024-06-28

## 2024-06-28 NOTE — TELEPHONE ENCOUNTER
Refill Per Protocol     Requested Prescriptions   Pending Prescriptions Disp Refills    ATORVASTATIN 20 MG Oral Tab [Pharmacy Med Name: ATORVASTATIN TAB 20MG] 90 tablet 0     Sig: TAKE 1 TABLET NIGHTLY       Cholesterol Medication Protocol Passed - 6/25/2024 12:13 PM        Passed - ALT < 80     Lab Results   Component Value Date    ALT 26 06/17/2024             Passed - ALT resulted within past year        Passed - Lipid panel within past 12 months     Lab Results   Component Value Date    CHOLEST 182 06/17/2024    TRIG 157 (H) 06/17/2024    HDL 55 06/17/2024     (H) 06/17/2024    VLDL 26 06/17/2024    TCHDLRATIO 2.90 10/15/2019    NONHDLC 127 06/17/2024             Passed - In person appointment or virtual visit in the past 12 mos or appointment in next 3 mos     Recent Outpatient Visits              4 days ago Encounter for annual health examination    [facility], [address] [place] [address] Mathew Juarez MD    Office Visit    2 months ago [facility] Jeffy Aguiar MD    Office Visit    5 months ago Right trigeminal neuralgia    [facility] le[facility]uga DO    Office Visit    8 months ago [facility] in [place] Jeffy Aguiar MD    Office Visit    11 months ago Right trigeminal neuralgia    [facility] Kimberly Metcalf DO    Office Visit          Future Appointments         Provider Department Appt Notes    In 3 months [facility]                           Future Appointments         Provider Department Appt Notes    In 3 months Jeffy Aguiar MD [facility] in [place] md romero          Recent Outpatient Visits              4 days ago Encounter for annual health examination    [facility]  [facility], [address] [place] [address] Mathew Juaerz MD    Office Visit    2 months ago [facility] [place] [facility]    Office Visit    5 months ago Right trigeminal neuralgia    [facility] Kimberly Metcalf DO    Office Visit    8 months ago [facility] [place] [facility], Jeffy FELDER MD    Office Visit    11 months ago Right trigeminal neuralgia    [facility] Kimberly Metcalf DO    Office Visit

## 2024-08-04 DIAGNOSIS — E03.9 HYPOTHYROIDISM, UNSPECIFIED TYPE: ICD-10-CM

## 2024-08-07 RX ORDER — LEVOTHYROXINE SODIUM 0.07 MG/1
75 TABLET ORAL
Qty: 90 TABLET | Refills: 3 | Status: SHIPPED | OUTPATIENT
Start: 2024-08-07

## 2024-08-07 NOTE — TELEPHONE ENCOUNTER
Refill passed per [facility] protocols.    Requested Prescriptions   Pending Prescriptions Disp Refills    LEVOTHYROXINE 75 MCG Oral Tab [Pharmacy Med Name: LEVOTHYROXIN TAB 75MCG] 90 tablet 3     Sig: TAKE 1 TABLET BEFORE       BREAKFAST       Thyroid Medication Protocol Passed - 8/4/2024  6:33 AM        Passed - TSH in past 12 months        Passed - Last TSH value is normal     Lab Results   Component Value Date    TSH 1.770 06/17/2024                 Passed - In person appointment or virtual visit in the past 12 mos or appointment in next 3 mos     Recent Outpatient Visits              1 month ago Encounter for annual health examination    [facility], [address] [place] [address] Mathew Juarez MD    Office Visit    3 months [facility]    7 months ago Right trigeminal neuralgia    [facility] Kimberly Metcalf DO    Office Visit    9 months ago [facility] in [place] AguiarJeffy MD    Office Visit    1 year ago Right trigeminal neuralgia    [facility] Kimberly Metcalf DO    Office Visit          Future Appointments         Provider Department Appt Notes    In 2 months Jeffy Aguiar MD [facility] in [place] md romero    In 4 months Milo Keith MD [facility] 07/08/2024, [facility] Recall Colon on, 12/20/2024 @ 8:00AM w/ Dr. Keith @ [facility].

## 2024-09-26 ENCOUNTER — TELEPHONE (OUTPATIENT)
Dept: INTERNAL MEDICINE CLINIC | Facility: CLINIC | Age: 69
End: 2024-09-26

## 2024-10-15 ENCOUNTER — LAB ENCOUNTER (OUTPATIENT)
Dept: LAB | Age: 69
End: 2024-10-15
Attending: INTERNAL MEDICINE
Payer: MEDICARE

## 2024-10-15 DIAGNOSIS — D47.2 SMOLDERING MYELOMA: ICD-10-CM

## 2024-10-15 LAB
ALBUMIN SERPL-MCNC: 4.2 G/DL (ref 3.2–4.8)
ALBUMIN/GLOB SERPL: 1 {RATIO} (ref 1–2)
ALP LIVER SERPL-CCNC: 91 U/L
ALT SERPL-CCNC: 21 U/L
ANION GAP SERPL CALC-SCNC: 6 MMOL/L (ref 0–18)
AST SERPL-CCNC: 27 U/L (ref ?–34)
BASOPHILS # BLD AUTO: 0.07 X10(3) UL (ref 0–0.2)
BASOPHILS NFR BLD AUTO: 1.3 %
BILIRUB SERPL-MCNC: 0.6 MG/DL (ref 0.2–1.1)
BUN BLD-MCNC: 19 MG/DL (ref 9–23)
CALCIUM BLD-MCNC: 10.1 MG/DL (ref 8.7–10.4)
CHLORIDE SERPL-SCNC: 102 MMOL/L (ref 98–112)
CO2 SERPL-SCNC: 28 MMOL/L (ref 21–32)
CREAT BLD-MCNC: 0.91 MG/DL
EGFRCR SERPLBLD CKD-EPI 2021: 68 ML/MIN/1.73M2 (ref 60–?)
EOSINOPHIL # BLD AUTO: 0.08 X10(3) UL (ref 0–0.7)
EOSINOPHIL NFR BLD AUTO: 1.5 %
ERYTHROCYTE [DISTWIDTH] IN BLOOD BY AUTOMATED COUNT: 12.6 %
FASTING STATUS PATIENT QL REPORTED: NO
GLOBULIN PLAS-MCNC: 4.1 G/DL (ref 2–3.5)
GLUCOSE BLD-MCNC: 91 MG/DL (ref 70–99)
HCT VFR BLD AUTO: 41 %
HGB BLD-MCNC: 13.5 G/DL
IGA SERPL-MCNC: 230.2 MG/DL (ref 40–350)
IGM SERPL-MCNC: 115.8 MG/DL (ref 50–300)
IMM GRANULOCYTES # BLD AUTO: 0.02 X10(3) UL (ref 0–1)
IMM GRANULOCYTES NFR BLD: 0.4 %
IMMUNOGLOBULIN PNL SER-MCNC: 2349 MG/DL (ref 650–1600)
LYMPHOCYTES # BLD AUTO: 2.39 X10(3) UL (ref 1–4)
LYMPHOCYTES NFR BLD AUTO: 43.8 %
MCH RBC QN AUTO: 31.5 PG (ref 26–34)
MCHC RBC AUTO-ENTMCNC: 32.9 G/DL (ref 31–37)
MCV RBC AUTO: 95.6 FL
MONOCYTES # BLD AUTO: 0.49 X10(3) UL (ref 0.1–1)
MONOCYTES NFR BLD AUTO: 9 %
NEUTROPHILS # BLD AUTO: 2.41 X10 (3) UL (ref 1.5–7.7)
NEUTROPHILS # BLD AUTO: 2.41 X10(3) UL (ref 1.5–7.7)
NEUTROPHILS NFR BLD AUTO: 44 %
OSMOLALITY SERPL CALC.SUM OF ELEC: 284 MOSM/KG (ref 275–295)
PLATELET # BLD AUTO: 327 10(3)UL (ref 150–450)
POTASSIUM SERPL-SCNC: 4.7 MMOL/L (ref 3.5–5.1)
PROT SERPL-MCNC: 8.3 G/DL (ref 5.7–8.2)
RBC # BLD AUTO: 4.29 X10(6)UL
SODIUM SERPL-SCNC: 136 MMOL/L (ref 136–145)
WBC # BLD AUTO: 5.5 X10(3) UL (ref 4–11)

## 2024-10-15 PROCEDURE — 85025 COMPLETE CBC W/AUTO DIFF WBC: CPT

## 2024-10-15 PROCEDURE — 86334 IMMUNOFIX E-PHORESIS SERUM: CPT

## 2024-10-15 PROCEDURE — 82784 ASSAY IGA/IGD/IGG/IGM EACH: CPT

## 2024-10-15 PROCEDURE — 84165 PROTEIN E-PHORESIS SERUM: CPT

## 2024-10-15 PROCEDURE — 83521 IG LIGHT CHAINS FREE EACH: CPT

## 2024-10-15 PROCEDURE — 36415 COLL VENOUS BLD VENIPUNCTURE: CPT

## 2024-10-15 PROCEDURE — 80053 COMPREHEN METABOLIC PANEL: CPT

## 2024-10-17 LAB
ALBUMIN SERPL ELPH-MCNC: 4.06 G/DL (ref 3.75–5.21)
ALBUMIN/GLOB SERPL: 1.03 {RATIO} (ref 1–2)
ALPHA1 GLOB SERPL ELPH-MCNC: 0.3 G/DL (ref 0.19–0.46)
ALPHA2 GLOB SERPL ELPH-MCNC: 0.66 G/DL (ref 0.48–1.05)
B-GLOBULIN SERPL ELPH-MCNC: 0.89 G/DL (ref 0.68–1.23)
GAMMA GLOB SERPL ELPH-MCNC: 2.09 G/DL (ref 0.62–1.7)
KAPPA LC FREE SER-MCNC: 20.38 MG/DL (ref 0.33–1.94)
KAPPA LC FREE/LAMBDA FREE SER NEPH: 13.44 {RATIO} (ref 0.26–1.65)
LAMBDA LC FREE SERPL-MCNC: 1.52 MG/DL (ref 0.57–2.63)
M PROTEIN 1 SERPL ELPH-MCNC: 1.62 G/DL (ref ?–0)
PROT SERPL-MCNC: 8 G/DL (ref 5.7–8.2)

## 2024-10-23 ENCOUNTER — OFFICE VISIT (OUTPATIENT)
Dept: HEMATOLOGY/ONCOLOGY | Facility: HOSPITAL | Age: 69
End: 2024-10-23
Attending: INTERNAL MEDICINE
Payer: MEDICARE

## 2024-10-23 VITALS
RESPIRATION RATE: 18 BRPM | OXYGEN SATURATION: 97 % | DIASTOLIC BLOOD PRESSURE: 80 MMHG | HEART RATE: 79 BPM | SYSTOLIC BLOOD PRESSURE: 137 MMHG | HEIGHT: 66.69 IN | BODY MASS INDEX: 23.03 KG/M2 | TEMPERATURE: 97 F | WEIGHT: 145 LBS

## 2024-10-23 DIAGNOSIS — C76.8 MALIGNANT NEOPLASM OF OTHER SPECIFIED ILL-DEFINED SITES (HCC): ICD-10-CM

## 2024-10-23 DIAGNOSIS — G50.0 TRIGEMINAL NEURALGIA: ICD-10-CM

## 2024-10-23 DIAGNOSIS — M85.88 OSTEOPENIA OF LUMBAR SPINE: ICD-10-CM

## 2024-10-23 DIAGNOSIS — D84.81 IMMUNODEFICIENCY DUE TO CONDITIONS CLASSIFIED ELSEWHERE (HCC): ICD-10-CM

## 2024-10-23 DIAGNOSIS — D47.2 SMOLDERING MYELOMA: Primary | ICD-10-CM

## 2024-10-23 PROCEDURE — 99214 OFFICE O/P EST MOD 30 MIN: CPT | Performed by: INTERNAL MEDICINE

## 2024-10-23 PROCEDURE — G2211 COMPLEX E/M VISIT ADD ON: HCPCS | Performed by: INTERNAL MEDICINE

## 2024-10-23 NOTE — PROGRESS NOTES
Outpatient Oncology Care Plan  Problem list:  knowledge deficit     Problems related to:    disease/disease progression     Interventions:  provided general teaching     Expected outcomes:  understands plan of care     Progress towards outcome:  making progress     Education Record     Learner:  Patient  Barriers / Limitations:  None  Method:  Brief focused  Outcome:  Shows understanding  Comments: 6 month f/up smoldering myeloma. Pt states she has been feeling well and her energy has been good. Looking to discuss labs. Will be having a routine colonoscopy 12/20.

## 2024-10-23 NOTE — PROGRESS NOTES
Hematology/Oncology Clinic Follow Up Visit    Patient Name: Nilesh Garcia  Medical Record Number: [id number]   YOB: 1955   PCP: Dr. Adam Schriedel  Other providers: Dr. Angus De Dios (liver), Dr. Kimberly Metcalf (neuro), Dr. Herbert Contreras (rad onc)    Reason for Consultation:  Nilesh Garcia was seen today for the diagnosis of smoldering myeloma    Hematologic History:  *Smoldering myeloma, IgG kappa  -3/5/2020- elevated transaminases and total protein prompted evaluation of a monoclonal protein study which showed IgG kappa M spike 1.54 g/dL.  Total IgG 2300mg/dL.   -4/7/20- normal CBC, CMP, LDH and B2M  -4/20/20- PET/CT- CONCLUSION:  No abnormal FDG activity.  -4/21/20- bmbx-mildly hypercellular bone marrow showing features consistent with plasma cell neoplasm.  Monotypic plasma cells comprise up to 12% of all nucleated cells.  Normal karyotype and myeloma FISH panel.    -4/12/21- PET/CT- CONCLUSION:  No abnormal FDG activity.  -4/22/22- PET/CT- CONCLUSION:  No abnormal FDG activity.  -4/19/23- MRI whole body- Enhancement is noted superior endplate of L3 vertebral body is noted favored to be reactive enhancement and a benign Schmorl's node.   -4/17/24- PET/CT- CONCLUSION:  No abnormal FDG activity.    ==============================  Interval events: Presents for follow-up.  She reports feeling well overall without any new issues.      She denies any new aches or pains.  No bowel or bladder changes.  She had a oral/dental infection several weeks ago that cleared up with a course of antibiotics without patient.  She denies any recent fevers, infections, or drenching night sweats otherwise.  No bleeding.   She denies any dyspnea or chest pain.  Her energy is good, she walked 9 holes of golf yesterday.  She walks 10K+ steps daily.    She has some chronic postnasal drip, Flonase helps.    Her previous trigeminal neuralgia pain has not been an issue since she underwent microvascular decompressive surgery  for trigeminal neuralgia on 7/26/23.  She has some chronic mild numbness in her toes without pain or imbalance that remains unchanged.  No other neurologic changes or neuropathy.      She received an influenza and updated COVID-19 vaccine booster 10/2024.  She received an RSV vaccine in 2023.    She goes for routine colonoscopy screening 12/20/24.    Past Medical History:  Past Medical History:    Diarrhea, unspecified    Fatigue    high dose of gabapentin    Hearing loss    wear hearing aids    High cholesterol    I take meds for this    Hypercholesteremia    Hyperlipidemia    Presence of other cardiac implants and grafts    pessary    Smoldering myeloma    Stool incontinence    Syncope    Syncope and collapse    Thyroid disease    I take meds for this - Levothyroxin    Trigeminal neuralgia    Right side of face intermittent pain. Pain started 2015 pain not severe. Pain starts to right cheek travel to the top of the head.  It is a burning sharp sensation.  Pt had a dental procedure in 2015 to be fitted for new dentures and bitting down on cold mold she began to then have pain.  Triggers talking, eating, drinking, touch to face, brushing teeth, and cold wind hitting her face.     Wears glasses    glasses     Past Surgical History:   Procedure Laterality Date    Colonoscopy  2013?    Ct cyberknife rad planning (no iv) em (cpt=77014) Right 10/30/2020    for trigeminal neuralgia- 75Gy in single fraction    Cyst removal  1981    Alo localization wire 1 site right (cpt=19281) Right 1981    benign.    Surgery - external      acl surgery    Tonsillectomy  long long time ago     Home Medications:   levothyroxine 75 MCG Oral Tab Take 1 tablet (75 mcg total) by mouth before breakfast. 90 tablet 3    PEG 3350-KCl-Na Bicarb-NaCl 420 g Oral Recon Soln Take as directed by physician 4000 mL 0    atorvastatin 20 MG Oral Tab Take 1 tablet (20 mg total) by mouth nightly. 90 tablet 3    Calcium Carb-Cholecalciferol (CALCIUM 600+D3 OR)  Take 2 tablets by mouth daily.      Cholecalciferol (VITAMIN D3) 25 MCG (1000 UT) Oral Cap Take 1 tablet by mouth daily.      Multiple Vitamins-Minerals (MULTIVITAMIN OR) Take by mouth daily.       Allergies:   Allergies   Allergen Reactions    Penicillins RASH     Occurred when she was a child, thinks its a rash       Psychosocial History:  Social History     Social History Narrative    .  Has 3 adult children.  5 grandchildren who live locally.  Retired high school guidance counselor     Social History     Socioeconomic History    Marital status:    Tobacco Use    Smoking status: Former     Current packs/day: 0.00     Average packs/day: 1.3 packs/day for 35.0 years (43.8 ttl pk-yrs)     Types: Cigarettes     Start date:      Quit date:      Years since quittin.8    Smokeless tobacco: Never   Vaping Use    Vaping status: Never Used   Substance and Sexual Activity    Alcohol use: Yes     Comment: I do drink socially on occasion - not much    Drug use: Never   Other Topics Concern    Caffeine Concern Yes     Comment: 1 serving/day    Exercise Yes     Comment: walking   Social History Narrative    .  Has 3 adult children.  5 grandchildren who live locally.  Retired high school guidance counselor     Social Drivers of Health     Food Insecurity: No Food Insecurity (3/8/2023)    [facility]    Hunger Vital Sign     Worried About Running Out of Food in the Last Year: Never true     Ran Out of Food in the Last Year: Never true   Transportation Needs: No Transportation Needs (3/8/2023)    [facility]    PRAPARE - Transportation     Lack of Transportation (Medical): No     Lack of Transportation (Non-Medical): No    [facility]    Housing Stability     Family Medical History:  Family History   Problem  Relation Age of Onset    Cancer Mother 68        Lung, +smoker    No Known Problems Daughter     No Known Problems Son     No Known Problems Maternal Grandmother     No Known Problems Maternal Grandfather     No Known Problems Paternal Grandmother     No Known Problems Paternal Grandfather     No Known Problems Son      Review of Systems:  A 10-point ROS was done with pertinent positives and negative per the HPI    Vital Signs:  Height: 169.4 cm (5' 6.69\") (10/23 0919)  Weight: 65.8 kg (145 lb) (10/23 0919)  BSA (Calculated - sq m): 1.76 sq meters (10/23 0919)  Pulse: 79 (10/23 0919)  BP: 137/80 (10/23 0919)  Temp: 97 °F (36.1 °C) (10/23 0919)  Do Not Use - Resp Rate: --  SpO2: 97 % (10/23 0919)    Wt Readings from Last 6 Encounters:   10/23/24 65.8 kg (145 lb)   06/24/24 66.5 kg (146 lb 9.6 oz)   04/24/24 66.2 kg (146 lb)   01/02/24 68.1 kg (150 lb 3.2 oz)   10/26/23 69.9 kg (154 lb)   07/20/23 69.9 kg (154 lb)     ECOG PS: 0    Physical Examination:  General: Patient is alert and oriented, not in acute distress  Psych: Mood and affect are appropriate  Eyes: EOMI  ENT: Oropharynx is clear  CV: Regular rate and rhythm, no murmurs, no LE edema  Respiratory: Lungs clear to auscultation bilaterally  GI/Abd: Soft, non-tender with normoactive bowel sounds, no hepatosplenomegaly  Neurological: Grossly intact   Lymphatics: No palpable lymphadenopathy  Skin: no rashes or petechiae    Laboratory:  Lab Results   Component Value Date    WBC 5.5 10/15/2024    WBC 5.0 06/17/2024    WBC 6.7 04/17/2024    HGB 13.5 10/15/2024    HGB 13.5 06/17/2024    HGB 13.5 04/17/2024    HCT 41.0 10/15/2024    MCV 95.6 10/15/2024    MCH 31.5 10/15/2024    MCHC 32.9 10/15/2024    RDW 12.6 10/15/2024    .0 10/15/2024    .0 06/17/2024    .0 04/17/2024     Lab Results   Component Value Date    GLU 91 10/15/2024    BUN 19 10/15/2024    BUNCREA 23.5 (H) 05/24/2021    CREATSERUM 0.91 10/15/2024    CREATSERUM 0.94 06/17/2024     CREATSERUM 1.01 04/17/2024    ANIONGAP 6 10/15/2024    GFR 65 07/28/2016    GFRNAA 60 06/09/2022    GFRAA 69 06/09/2022    CA 10.1 10/15/2024    OSMOCALC 284 10/15/2024    ALKPHO 91 10/15/2024    AST 27 10/15/2024    ALT 21 10/15/2024    BILT 0.6 10/15/2024    TP 8.3 (H) 10/15/2024    TP 8.0 10/15/2024    ALB 4.2 10/15/2024    ALB 4.06 10/15/2024    GLOBULIN 4.1 (H) 10/15/2024     10/15/2024    K 4.7 10/15/2024     10/15/2024    CO2 28.0 10/15/2024     Lab Results   Component Value Date    PTT 25.6 07/10/2023    INR 0.91 07/10/2023     Lab Results   Component Value Date    ELECTMS1 1.62 (H) 10/15/2024    ELECTMS1 1.58 (H) 04/17/2024    ELECTMS1 1.62 (H) 10/18/2023    ELECTMS1 1.57 (H) 04/19/2023    ELECTMS1 1.55 (H) 10/20/2022    ELECTMS1 1.61 (H) 04/20/2022    ELECTMS1 1.48 (H) 10/11/2021    ELECTMS1 1.62 (H) 04/12/2021    ELECTMS1 1.57 (H) 12/30/2020    ELECTMS1 1.49 (H) 10/01/2020    ELECTMS1 1.55 (H) 07/11/2020    ELECTMS1 1.54 (H) 03/05/2020     Lab Results   Component Value Date    KAPPALTCHN 20.383 (H) 10/15/2024    KAPPALTCHN 18.812 (H) 04/17/2024    KAPPALTCHN 15.451 (H) 10/18/2023    KAPPALTCHN 13.346 (H) 04/19/2023    KAPPALTCHN 15.546 (H) 10/20/2022    KAPPALTCHN 11.215 (H) 04/20/2022    KAPPALTCHN 15.769 (H) 10/11/2021    KAPPALTCHN 7.758 (H) 04/12/2021    KAPPALTCHN 8.847 (H) 12/30/2020    KAPPALTCHN 8.244 (H) 10/01/2020    KAPPALTCHN 5.153 (H) 07/11/2020    KAPPALTCHN 7.731 (H) 03/05/2020      Lab Results   Component Value Date    LAMBDALTCH 1.517 10/15/2024    LAMBDALTCH 1.459 04/17/2024    LAMBDALTCH 1.531 10/18/2023    LAMBDALTCH 1.482 04/19/2023    LAMBDALTCH 1.781 10/20/2022    LAMBDALTCH 1.563 04/20/2022    LAMBDALTCH 1.845 10/11/2021    LAMBDALTCH 1.600 04/12/2021    LAMBDALTCH 1.947 12/30/2020    LAMBDALTCH 1.363 10/01/2020    LAMBDALTCH 1.335 07/11/2020    LAMBDALTCH 1.575 03/05/2020     Lab Results   Component Value Date    KAPLAMRATIO 13.44 (H) 10/15/2024    KAPLAMRATIO 12.89  (H) 04/17/2024    KAPLAMRATIO 10.09 (H) 10/18/2023    KAPLAMRATIO 9.01 (H) 04/19/2023    KAPLAMRATIO 8.73 (H) 10/20/2022    KAPLAMRATIO 7.18 (H) 04/20/2022    KAPLAMRATIO 8.55 (H) 10/11/2021    KAPLAMRATIO 4.85 (H) 04/12/2021    KAPLAMRATIO 4.54 (H) 12/30/2020    KAPLAMRATIO 6.05 (H) 10/01/2020    KAPLAMRATIO 3.86 (H) 07/11/2020    KAPLAMRATIO 1.2 04/21/2020    KAPLAMRATIO 4.91 (H) 03/05/2020     Lab Results   Component Value Date    IGG 2,349 (H) 10/15/2024    IGG 2,300 (H) 04/17/2024    IGG 2,410 (H) 10/18/2023    IGG 2,190 (H) 04/19/2023    IGG 2,260 (H) 10/20/2022    IGG 2,270 (H) 04/20/2022    IGG 2,070 (H) 10/11/2021    IGG 2,270 (H) 04/12/2021    IGG 2,340 (H) 12/30/2020    IGG 2,130 (H) 10/01/2020    IGG 2,150 (H) 07/11/2020    IGG 2,300 (H) 03/05/2020     Lab Results   Component Value Date     04/20/2022     07/11/2020     04/07/2020     Lab Results   Component Value Date    B2MICR 0.203 04/20/2022    B2MICR 0.211 07/11/2020    B2MICR 0.205 04/07/2020     Component      Latest Ref Rng & Units 4/10/2020   Total Protein, Urine      <=150 mg/d 71 mg/d   FREE URINARY KAPPA LIGHT CHAIN      0.00 - 32.90 mg/L 24.57   FREE URINARY KAPPA EXCRETION/D      mg/d 71.25   FREE UR LAMBDA LIGHT CHAIN      0.00 - 3.79 mg/L <0.74   FREE UR LAMBDA EXCRETION/DAY      mg/d  Unable to quantitate free light chain excretion per day.   MOLLY INTERPRETATION       Urine is POSITIVE for monoclonal Free Kappa Light chains (Bence Steele Protein). Urine MOLLY shows a monoclonal IgG heavy chain with associated kappa light chain and excess monoclonal free kappa light chains.    TOTAL VOLUME      mL 2900   HOURS COLLECTED      hr 24     Component      Latest Ref Rng & Units 4/19/2022   PROTEIN URINE CALC      <149.1 mg/24 hr 235.0 (H)   Urine Volume 24Hr      mL 2,350   URINE BENCE STEELE PROTEIN 24HR       24-hour urine concentrated 50X demonstrates the presence of free Monoclonal Kappa Light Chains (Bence Steele Protein).   The free monoclonal light chain represents 0.01 grams. Whole molecule IgG Kappa is also detected.     Component      Latest Ref Rng 4/19/2023   PROTEIN URINE CALC      <149.1 mg/24 hr 167.2 (H)    Urine Volume 24Hr      mL 2,200    URINE BENCE STEELE PROTEIN 24HR 24-hour urine concentrated 50X demonstrates the presence of free Monoclonal Kappa Light Chains (Bence Steele Protein).  The free monoclonal light chain represents 0.01 grams. Whole molecule IgG Kappa is also detected.      4/17/24: 24-hour urine concentrated 50X demonstrates the presence of trace amounts of Free Kappa Monoclonal Light Chains (Bence Steele Protein). Unable to quantitate the 24-hour Bence Steele Protein due to the small amount of free light chains present.     Imaging:    DEXA 1/30/19: Osteopenia with lumbar T score -1.9, total hip T score -1.7, femoral neck T score -0.8.    Impression & Plan:     *Smoldering myeloma  -Based on BMPC >10% without any clinical features of myeloma (negative PET/CT scan, no CRAB criteria)  -We discussed that no treatment is indicated at this time but requires ongoing surveillance.  Based on the [facility] 20/20/20 risk stratification model at time of bmbx she has 0 points= low risk (BMPC<20%, mspike <2 and sFLC ratio <20)-  This predicts a 2yr PFS of 90%.   -Her most recent labs are stable from prior.  Whole-body MRI shows a likely benign spine lesion, no convincing evidence of myeloma.    -will continue to monitor:   -Repeat CBC, CMP, quantitative immunoglobulins, monoclonal protein study in 6 months    -repeat 24-hour urine collection for Bence-Steele proteins annually (due next 4/2025)   -whole body MRI or PET/CT annually (she prefers PET scans)- we will plan for PET/CT scan next in 4/2025- order placed  -Based on smoldering myeloma diagnosis, she has a degree of immunodeficiency and therefore I have recommended the importance of staying up-to-date with vaccinations.  She is currently up-to-date.    *Trigeminal  neuralgia  -Unrelated to underlying smoldering myeloma or monoclonal gammopathy.  -s/p right V2 Rhizotomy at [facility] 5/16/22 without significant benefit, then microvascular decompression surgery on 7/26/23 with complete resolution of pain.  Has been able to completely wean off gabapentin.      *Osteopenia of lumbar spine  -Will repeat DEXA scan- order placed.  If worsening bone mineral density would check vitamin D level and consider Prolia.  -Currently taking supplemental calcium and vitamin D3 1000 IU daily    RTC in 6 months    Jeffy Aguiar MD  Hematology/Medical Oncology  [facility]     ongoing continuity of complex care related to malignancy.

## 2024-10-29 ENCOUNTER — HOSPITAL ENCOUNTER (OUTPATIENT)
Dept: BONE DENSITY | Age: 69
Discharge: HOME | End: 2024-10-29
Attending: INTERNAL MEDICINE
Payer: MEDICARE

## 2024-10-29 DIAGNOSIS — M85.88 OSTEOPENIA OF LUMBAR SPINE: ICD-10-CM

## 2024-10-29 DIAGNOSIS — D47.2 SMOLDERING MYELOMA: ICD-10-CM

## 2024-10-29 PROCEDURE — 77080 DXA BONE DENSITY AXIAL: CPT | Performed by: INTERNAL MEDICINE

## 2024-12-20 PROBLEM — D12.3 BENIGN NEOPLASM OF TRANSVERSE COLON: Status: ACTIVE | Noted: 2024-12-20

## 2024-12-20 PROBLEM — D12.4 BENIGN NEOPLASM OF DESCENDING COLON: Status: ACTIVE | Noted: 2024-12-20

## 2024-12-20 PROBLEM — Z86.0101 PERSONAL HISTORY OF ADENOMATOUS AND SERRATED COLON POLYPS: Status: ACTIVE | Noted: 2024-12-20

## 2025-03-13 ENCOUNTER — OFFICE VISIT (OUTPATIENT)
Facility: LOCATION | Age: 70
End: 2025-03-13
Payer: MEDICARE

## 2025-03-13 DIAGNOSIS — J30.0 VASOMOTOR RHINITIS: ICD-10-CM

## 2025-03-13 DIAGNOSIS — Q18.1 CYST OF PINNA: Primary | ICD-10-CM

## 2025-03-13 PROCEDURE — 99203 OFFICE O/P NEW LOW 30 MIN: CPT | Performed by: OTOLARYNGOLOGY

## 2025-03-13 RX ORDER — IPRATROPIUM BROMIDE 42 UG/1
1 SPRAY, METERED NASAL 2 TIMES DAILY
Qty: 1 EACH | Refills: 5 | Status: SHIPPED | OUTPATIENT
Start: 2025-03-13

## 2025-03-13 NOTE — PROGRESS NOTES
Nilesh Garcia is a 69 year old female. No chief complaint on file.    HPI:   She has a history of cyst behind the right ear.  Is been present for many years.  It has not gotten infected is nonpainful.  She also gets a chronic runny nose and postnasal drip  Current Outpatient Medications   Medication Sig Dispense Refill    ipratropium 0.06 % Nasal Solution 1 spray by Nasal route 2 (two) times daily. 1 each 5    levothyroxine 75 MCG Oral Tab Take 1 tablet (75 mcg total) by mouth before breakfast. 90 tablet 3    PEG 3350-KCl-Na Bicarb-NaCl 420 g Oral Recon Soln Take as directed by physician (Patient not taking: Reported on 11/22/2024) 4000 mL 0    atorvastatin 20 MG Oral Tab Take 1 tablet (20 mg total) by mouth nightly. 90 tablet 3    Calcium Carb-Cholecalciferol (CALCIUM 600+D3 OR) Take 2 tablets by mouth daily.      Cholecalciferol (VITAMIN D3) 25 MCG (1000 UT) Oral Cap Take 1 tablet by mouth daily.      Multiple Vitamins-Minerals (MULTIVITAMIN OR) Take by mouth daily.        Past Medical History:    Diarrhea, unspecified    Fatigue    high dose of gabapentin    Hearing loss    wear hearing aids    High cholesterol    I take meds for this    Hypercholesteremia    Hyperlipidemia    Osteopenia of lumbar spine    Presence of other cardiac implants and grafts    pessary    Smoldering myeloma    Stool incontinence    Syncope    Syncope and collapse    Thyroid disease    I take meds for this - Levothyroxin    Trigeminal neuralgia    Right side of face intermittent pain. Pain started 2015 pain not severe. Pain starts to right cheek travel to the top of the head.  It is a burning sharp sensation.  Pt had a dental procedure in 2015 to be fitted for new dentures and bitting down on cold mold she began to then have pain.  Triggers talking, eating, drinking, touch to face, brushing teeth, and cold wind hitting her face.     Wears glasses    glasses      Social History:  Social History     Socioeconomic History    Marital  status:    Tobacco Use    Smoking status: Former     Current packs/day: 0.00     Average packs/day: 1.3 packs/day for 35.0 years (43.8 ttl pk-yrs)     Types: Cigarettes     Start date:      Quit date: 10/12/2010     Years since quittin.4    Smokeless tobacco: Never    Tobacco comments:     its OVER   Vaping Use    Vaping status: Never Used   Substance and Sexual Activity    Alcohol use: Yes     Comment: I do drink socially on occasion - not much    Drug use: Never   Other Topics Concern    Caffeine Concern Yes     Comment: 1 serving/day    Exercise Yes     Comment: walking   Social History Narrative    .  Has 3 adult children.  5 grandchildren who live locally.  Retired high school guidance counselor     Social Drivers of Health     Food Insecurity: No Food Insecurity (3/8/2023)    Received from [facility]    Hunger Vital Sign     Worried About Running Out of Food in the Last Year: Never true     Ran Out of Food in the Last Year: Never true   Transportation Needs: No Transportation Needs (3/8/2023)    Received from [facility]    PRAPARE - Transportation     Lack of Transportation (Medical): No     Lack of Transportation (Non-Medical): No    [facility]    Housing Stability      Past Surgical History:   Procedure Laterality Date    Colonoscopy  ?    Ct cyberknife rad planning (no iv) em (cpt=77014) Right 10/30/2020    for trigeminal neuralgia- 75Gy in single fraction    Cyst removal  1981    Alo localization wire 1 site right (cpt=19281) Right     benign.    Surgery - external      acl surgery    Tonsillectomy  long long time ago         REVIEW OF SYSTEMS:   GENERAL HEALTH: feels well otherwise  GENERAL : denies fever, chills, sweats, weight loss, weight gain  SKIN: denies any unusual skin lesions or rashes  RESPIRATORY: denies  shortness of breath with exertion  NEURO: denies headaches    EXAM:   There were no vitals taken for this visit.    System Findings Details   Constitutional  Overall appearance - Normal.   Psychiatric  Orientation - Oriented to time, place, person & situation. Appropriate mood and affect.   Head/Face  Facial features -- Normal. Skull - Normal.   Eyes  Pupils equal ,round ,react to light and accomidate   Ears, Nose, Throat, Neck  Tympanic membrane's are clear there is a cyst in the postauricular crease on the right nose clear pharynx clear neck no masses   Neurological  Memory - Normal. Cranial nerves - Cranial nerves II through XII grossly intact.   Lymph Detail  Submental. Submandibular. Anterior cervical. Posterior cervical. Supraclavicular.       ASSESSMENT AND PLAN:   1. Cyst of pinna  In the postauricular crease on the right.  Will plan for excision under local anesthesia.    2. Vasomotor rhinitis  She will try Atrovent nasal spray.      The patient indicates understanding of these issues and agrees to the plan.    No follow-ups on file.    Nas Vazquez MD  3/13/2025  5:53 PM

## 2025-03-17 DIAGNOSIS — Q18.1 AURICULAR CYST: Primary | ICD-10-CM

## 2025-03-24 ENCOUNTER — APPOINTMENT (OUTPATIENT)
Dept: ADMINISTRATIVE | Facility: HOSPITAL | Age: 70
End: 2025-03-24
Payer: COMMERCIAL

## 2025-04-04 NOTE — H&P
[facility]  History & Physical    Nilesh Garcia Patient Status:  Hospital Outpatient Surgery    1955 MRN [id number]   Location [facility] SURGERY Attending Nas Vazquez MD   Hosp Day # 0 PCP Mathew Juarez MD     History of Present Illness:  Nilesh Garcia is a(n) 69 year old female.  Patient with history of cyst behind the right ear.    History:  Past Medical History:    Diarrhea, unspecified    Disorder of thyroid    Diverticulosis of large intestine    Fatigue    high dose of gabapentin    Hearing impaired person, bilateral    HEARING AIDS    Hearing loss    wear hearing aids    High cholesterol    I take meds for this    Hypercholesteremia    Hyperlipidemia    Osteopenia of lumbar spine    Presence of other cardiac implants and grafts    pessary    Smoldering myeloma    Stool incontinence    Syncope    Syncope and collapse    Thyroid disease    I take meds for this - Levothyroxin    Trigeminal neuralgia    Right side of face intermittent pain. Pain started  pain not severe. Pain starts to right cheek travel to the top of the head.  It is a burning sharp sensation.  Pt had a dental procedure in  to be fitted for new dentures and bitting down on cold mold she began to then have pain.  Triggers talking, eating, drinking, touch to face, brushing teeth, and cold wind hitting her face.     Visual impairment    GLASSES    Wears glasses    glasses     Past Surgical History:   Procedure Laterality Date    Colonoscopy  ?    Ct cyberknife rad planning (no iv) em (cpt=77014) Right 10/30/2020    for trigeminal neuralgia- 75Gy in single fraction    Cyst removal      Alo localization wire 1 site right (cpt=19281) Right     benign.    Surgery - external      acl surgery    Tonsillectomy  long long time ago     Family History   Problem Relation Age of Onset    Cancer Mother 68        Lung, +smoker    No Known Problems Daughter     No Known Problems Son     No Known Problems  Maternal Grandmother     No Known Problems Maternal Grandfather     No Known Problems Paternal Grandmother     No Known Problems Paternal Grandfather     No Known Problems Son       reports that she quit smoking about 14 years ago. Her smoking use included cigarettes. She started smoking about 50 years ago. She has a 43.8 pack-year smoking history. She has never used smokeless tobacco. She reports current alcohol use. She reports that she does not use drugs.    Allergies:  Allergies[1]    Home Medications:  Prescriptions Prior to Admission[2]    Physical Exam:   General: Alert, orientated x3.  Cooperative.  No apparent distress.  Vital Signs:  Ht 5' 6\" (1.676 m)   Wt 145 lb (65.8 kg)   BMI 23.40 kg/m²   HEENT there is a postauricular cyst on the right.  Neck: No tenderness to palpitation.  Full range of motion to flexion and extension, lateral rotation and lateral flexion of cervical spine.  No JVD. Supple.   Lungs: Clear to auscultation bilaterally.  Cardiac: Regular rate and rhythm. No murmur.  Abdomen:  Soft, non-distended, non-tender, with no rebound or guarding.  No peritoneal signs. No ascites.  Liver is within normal limits.  Spleen is not palpable.    Extremities:  No lower extremity edema noted.  Without clubbing or cyanosis.    Skin: Normal texture and turgor.  Lymphatic:  No palpable cervical lymphadenopathy.  Neurologic: Cranial nerves are grossly intact.  Motor strength and sensory examination is grossly normal.  No focal neurologic deficit.    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        Nas Vazquez MD  4/4/2025  8:13 AM         [1]   Allergies  Allergen Reactions    Penicillins RASH     Occurred when she was a child, thinks its a rash   [2]   No medications prior to admission.

## 2025-04-08 ENCOUNTER — HOSPITAL ENCOUNTER (OUTPATIENT)
Facility: HOSPITAL | Age: 70
Setting detail: HOSPITAL OUTPATIENT SURGERY
Discharge: HOME | End: 2025-04-08
Attending: OTOLARYNGOLOGY | Admitting: OTOLARYNGOLOGY
Payer: MEDICARE

## 2025-04-08 VITALS
HEART RATE: 75 BPM | HEIGHT: 66 IN | TEMPERATURE: 98 F | WEIGHT: 151 LBS | BODY MASS INDEX: 24.27 KG/M2 | OXYGEN SATURATION: 99 % | SYSTOLIC BLOOD PRESSURE: 136 MMHG | DIASTOLIC BLOOD PRESSURE: 71 MMHG | RESPIRATION RATE: 17 BRPM

## 2025-04-08 DIAGNOSIS — Q18.1 AURICULAR CYST: ICD-10-CM

## 2025-04-08 PROCEDURE — 0HB2XZZ EXCISION OF RIGHT EAR SKIN, EXTERNAL APPROACH: ICD-10-PCS | Performed by: OTOLARYNGOLOGY

## 2025-04-08 PROCEDURE — 11440 EXC FACE-MM B9+MARG 0.5 CM/<: CPT | Performed by: OTOLARYNGOLOGY

## 2025-04-08 RX ORDER — LIDOCAINE HYDROCHLORIDE AND EPINEPHRINE 10; 10 MG/ML; UG/ML
INJECTION, SOLUTION INFILTRATION; PERINEURAL AS NEEDED
Status: DISCONTINUED | OUTPATIENT
Start: 2025-04-08 | End: 2025-04-08 | Stop reason: HOSPADM

## 2025-04-08 NOTE — OPERATIVE REPORT
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  There is prepped and draped usual sterile fashion.  #15 blade was used to make elliptical incision about the cyst.  It was then dissected out using tenotomy scissors.  There was essentially no bleeding.  The wound was then closed with Monocryl suture.  Bacitracin ointment was applied.    Nas Vazquez MD  4/8/2025  2:42 PM

## 2025-04-08 NOTE — DISCHARGE INSTRUCTIONS
Apply antibiotic ointment to the wound twice a day.  The sutures should fall out on their own.  If there is any problems call our office and I will see you in follow-up.

## 2025-04-08 NOTE — INTERVAL H&P NOTE
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  I discussed with the patient and/or legal representative the potential benefits, risks and side effects of this procedure; the likelihood of the patient achieving goals; and potential problems that might occur during recuperation.  I discussed reasonable alternatives to the procedure, including risks, benefits and side effects related to the alternatives and risks related to not receiving this procedure.  We will proceed with procedure as planned.

## 2025-04-10 ENCOUNTER — MED REC SCAN ONLY (OUTPATIENT)
Facility: LOCATION | Age: 70
End: 2025-04-10

## 2025-04-16 ENCOUNTER — LAB ENCOUNTER (OUTPATIENT)
Dept: LAB | Age: 70
End: 2025-04-16
Attending: INTERNAL MEDICINE
Payer: MEDICARE

## 2025-04-16 DIAGNOSIS — M85.88 OSTEOPENIA OF LUMBAR SPINE: ICD-10-CM

## 2025-04-16 DIAGNOSIS — D47.2 SMOLDERING MYELOMA: ICD-10-CM

## 2025-04-16 LAB
ALBUMIN SERPL-MCNC: 4.3 G/DL (ref 3.2–4.8)
ALBUMIN/GLOB SERPL: 1 {RATIO} (ref 1–2)
ALP LIVER SERPL-CCNC: 82 U/L (ref 55–142)
ALT SERPL-CCNC: 18 U/L (ref 10–49)
ANION GAP SERPL CALC-SCNC: 9 MMOL/L (ref 0–18)
AST SERPL-CCNC: 23 U/L (ref ?–34)
BASOPHILS # BLD AUTO: 0.06 X10(3) UL (ref 0–0.2)
BASOPHILS NFR BLD AUTO: 1.1 %
BILIRUB SERPL-MCNC: 0.7 MG/DL (ref 0.2–1.1)
BUN BLD-MCNC: 18 MG/DL (ref 9–23)
CALCIUM BLD-MCNC: 9.9 MG/DL (ref 8.7–10.6)
CHLORIDE SERPL-SCNC: 103 MMOL/L (ref 98–112)
CO2 SERPL-SCNC: 29 MMOL/L (ref 21–32)
CREAT BLD-MCNC: 0.97 MG/DL (ref 0.55–1.02)
EGFRCR SERPLBLD CKD-EPI 2021: 63 ML/MIN/1.73M2 (ref 60–?)
EOSINOPHIL # BLD AUTO: 0.15 X10(3) UL (ref 0–0.7)
EOSINOPHIL NFR BLD AUTO: 2.7 %
ERYTHROCYTE [DISTWIDTH] IN BLOOD BY AUTOMATED COUNT: 12.5 %
FASTING STATUS PATIENT QL REPORTED: YES
GLOBULIN PLAS-MCNC: 4.1 G/DL (ref 2–3.5)
GLUCOSE BLD-MCNC: 113 MG/DL (ref 70–99)
HCT VFR BLD AUTO: 41.2 % (ref 35–48)
HGB BLD-MCNC: 13.6 G/DL (ref 12–16)
IGA SERPL-MCNC: 252.7 MG/DL (ref 40–350)
IGM SERPL-MCNC: 127.6 MG/DL (ref 50–300)
IMM GRANULOCYTES # BLD AUTO: 0.02 X10(3) UL (ref 0–1)
IMM GRANULOCYTES NFR BLD: 0.4 %
IMMUNOGLOBULIN PNL SER-MCNC: 2332 MG/DL (ref 650–1600)
LYMPHOCYTES # BLD AUTO: 2.04 X10(3) UL (ref 1–4)
LYMPHOCYTES NFR BLD AUTO: 36.7 %
M PROTEIN 24H UR ELPH-MRATE: 188.7 MG/24 HR (ref ?–149.1)
MCH RBC QN AUTO: 32.3 PG (ref 26–34)
MCHC RBC AUTO-ENTMCNC: 33 G/DL (ref 31–37)
MCV RBC AUTO: 97.9 FL (ref 80–100)
MONOCYTES # BLD AUTO: 0.47 X10(3) UL (ref 0.1–1)
MONOCYTES NFR BLD AUTO: 8.5 %
NEUTROPHILS # BLD AUTO: 2.82 X10 (3) UL (ref 1.5–7.7)
NEUTROPHILS # BLD AUTO: 2.82 X10(3) UL (ref 1.5–7.7)
NEUTROPHILS NFR BLD AUTO: 50.6 %
OSMOLALITY SERPL CALC.SUM OF ELEC: 295 MOSM/KG (ref 275–295)
PLATELET # BLD AUTO: 342 10(3)UL (ref 150–450)
POTASSIUM SERPL-SCNC: 4.4 MMOL/L (ref 3.5–5.1)
PROT SERPL-MCNC: 8.4 G/DL (ref 5.7–8.2)
RBC # BLD AUTO: 4.21 X10(6)UL (ref 3.8–5.3)
SODIUM SERPL-SCNC: 141 MMOL/L (ref 136–145)
SPECIMEN VOL UR: 2450 ML
VIT D+METAB SERPL-MCNC: 69.3 NG/ML (ref 30–100)
WBC # BLD AUTO: 5.6 X10(3) UL (ref 4–11)

## 2025-04-16 PROCEDURE — 82784 ASSAY IGA/IGD/IGG/IGM EACH: CPT

## 2025-04-16 PROCEDURE — 86335 IMMUNFIX E-PHORSIS/URINE/CSF: CPT

## 2025-04-16 PROCEDURE — 80053 COMPREHEN METABOLIC PANEL: CPT

## 2025-04-16 PROCEDURE — 84165 PROTEIN E-PHORESIS SERUM: CPT

## 2025-04-16 PROCEDURE — 83521 IG LIGHT CHAINS FREE EACH: CPT

## 2025-04-16 PROCEDURE — 86334 IMMUNOFIX E-PHORESIS SERUM: CPT

## 2025-04-16 PROCEDURE — 85025 COMPLETE CBC W/AUTO DIFF WBC: CPT

## 2025-04-16 PROCEDURE — 84156 ASSAY OF PROTEIN URINE: CPT

## 2025-04-16 PROCEDURE — 82306 VITAMIN D 25 HYDROXY: CPT

## 2025-04-16 PROCEDURE — 84166 PROTEIN E-PHORESIS/URINE/CSF: CPT

## 2025-04-16 PROCEDURE — 36415 COLL VENOUS BLD VENIPUNCTURE: CPT

## 2025-04-18 ENCOUNTER — HOSPITAL ENCOUNTER (OUTPATIENT)
Dept: NUCLEAR MEDICINE | Facility: HOSPITAL | Age: 70
Discharge: HOME | End: 2025-04-18
Attending: INTERNAL MEDICINE
Payer: MEDICARE

## 2025-04-18 ENCOUNTER — OFFICE VISIT (OUTPATIENT)
Facility: LOCATION | Age: 70
End: 2025-04-18
Payer: MEDICARE

## 2025-04-18 DIAGNOSIS — Q18.1 CYST OF PINNA: Primary | ICD-10-CM

## 2025-04-18 DIAGNOSIS — C76.8 MALIGNANT NEOPLASM OF OTHER SPECIFIED ILL-DEFINED SITES (HCC): ICD-10-CM

## 2025-04-18 DIAGNOSIS — D47.2 SMOLDERING MYELOMA: ICD-10-CM

## 2025-04-18 LAB — GLUCOSE BLD-MCNC: 98 MG/DL (ref 70–99)

## 2025-04-18 PROCEDURE — 99024 POSTOP FOLLOW-UP VISIT: CPT | Performed by: OTOLARYNGOLOGY

## 2025-04-18 PROCEDURE — 78816 PET IMAGE W/CT FULL BODY: CPT | Performed by: INTERNAL MEDICINE

## 2025-04-18 PROCEDURE — 82962 GLUCOSE BLOOD TEST: CPT

## 2025-04-18 NOTE — PROGRESS NOTES
Nilesh Garcia is a 69 year old female.   Chief Complaint   Patient presents with    Post-Op     HPI:   She is postop excision right postauricular cyst.  She is otherwise healing well.  She was having some pain in the area which is since gone away.    REVIEW OF SYSTEMS:   GENERAL HEALTH: feels well otherwise  GENERAL : denies fever, chills, sweats, weight loss, weight gain  SKIN: denies any unusual skin lesions or rashes  RESPIRATORY: denies shortness of breath with exertion  NEURO: denies headaches    EXAM:   There were no vitals taken for this visit.    System Findings Details   Constitutional  Overall appearance - Normal.   Psychiatric  Orientation - Oriented to time, place, person & situation. Appropriate mood and affect.   Head/Face  Facial features -- Normal. Skull - Normal.   Eyes  Pupils equal ,round ,react to light and accomidate   Ears, Nose, Throat, Neck  As a few stitches no evidence of infection there removed today without difficulty   Neurological  Memory - Normal. Cranial nerves - Cranial nerves II through XII grossly intact.   Lymph Detail  Submental. Submandibular. Anterior cervical. Posterior cervical. Supraclavicular.       ASSESSMENT AND PLAN:   1. Cyst of pinna  Stable status post excision of right postauricular cyst.  She will see me back as needed.      The patient indicates understanding of these issues and agrees to the plan.    No follow-ups on file.    Nas Vazquez MD  4/18/2025  10:47 AM

## 2025-04-22 LAB
ALBUMIN SERPL ELPH-MCNC: 4.13 G/DL (ref 3.75–5.21)
ALBUMIN/GLOB SERPL: 1.07 {RATIO} (ref 1–2)
ALPHA1 GLOB SERPL ELPH-MCNC: 0.29 G/DL (ref 0.19–0.46)
ALPHA2 GLOB SERPL ELPH-MCNC: 0.66 G/DL (ref 0.48–1.05)
B-GLOBULIN SERPL ELPH-MCNC: 0.92 G/DL (ref 0.68–1.23)
GAMMA GLOB SERPL ELPH-MCNC: 2 G/DL (ref 0.62–1.7)
KAPPA LC FREE SER-MCNC: 16.68 MG/DL (ref 0.33–1.94)
KAPPA LC FREE/LAMBDA FREE SER NEPH: 11 {RATIO} (ref 0.26–1.65)
LAMBDA LC FREE SERPL-MCNC: 1.52 MG/DL (ref 0.57–2.63)
M PROTEIN 1 SERPL ELPH-MCNC: 1.52 G/DL (ref ?–0)
PROT SERPL-MCNC: 8 G/DL (ref 5.7–8.2)

## 2025-04-23 ENCOUNTER — OFFICE VISIT (OUTPATIENT)
Age: 70
End: 2025-04-23
Attending: INTERNAL MEDICINE
Payer: MEDICARE

## 2025-04-23 VITALS
WEIGHT: 151.63 LBS | HEART RATE: 77 BPM | BODY MASS INDEX: 24.08 KG/M2 | RESPIRATION RATE: 16 BRPM | TEMPERATURE: 97 F | HEIGHT: 66.69 IN | OXYGEN SATURATION: 98 % | DIASTOLIC BLOOD PRESSURE: 68 MMHG | SYSTOLIC BLOOD PRESSURE: 104 MMHG

## 2025-04-23 DIAGNOSIS — M85.88 OSTEOPENIA OF LUMBAR SPINE: ICD-10-CM

## 2025-04-23 DIAGNOSIS — D47.2 SMOLDERING MYELOMA: Primary | ICD-10-CM

## 2025-04-23 DIAGNOSIS — D84.81 IMMUNODEFICIENCY DUE TO CONDITIONS CLASSIFIED ELSEWHERE (HCC): ICD-10-CM

## 2025-04-23 NOTE — PROGRESS NOTES
Hematology/Oncology Clinic Follow Up Visit    Patient Name: Nilesh Garcia  Medical Record Number: [id number]   YOB: 1955   PCP: Dr. Adam Schriedel  Other providers: Dr. Angus De Dios (liver), Dr. Kimberly Metcalf (neuro), Dr. Herbert Contreras (rad onc)    Reason for Consultation:  Nilesh Garcia was seen today for the diagnosis of smoldering myeloma    Hematologic History:  *Smoldering myeloma, IgG kappa  -3/5/2020- elevated transaminases and total protein prompted evaluation of a monoclonal protein study which showed IgG kappa M spike 1.54 g/dL.  Total IgG 2300mg/dL.   -4/7/20- normal CBC, CMP, LDH and B2M  -4/20/20- PET/CT- CONCLUSION:  No abnormal FDG activity.  -4/21/20- bmbx-mildly hypercellular bone marrow showing features consistent with plasma cell neoplasm.  Monotypic plasma cells comprise up to 12% of all nucleated cells.  Normal karyotype and myeloma FISH panel.    -4/12/21- PET/CT- CONCLUSION:  No abnormal FDG activity.  -4/22/22- PET/CT- CONCLUSION:  No abnormal FDG activity.  -4/19/23- MRI whole body- Enhancement is noted superior endplate of L3 vertebral body is noted favored to be reactive enhancement and a benign Schmorl's node.   -4/17/24- PET/CT- CONCLUSION:  No abnormal FDG activity.  -4/18/25- PET/CT- No evidence of FDG-avid residual or recurrent malignancy.    ==============================  Interval events: Presents for follow-up.  She reports feeling well overall without any new issues.      She denies any new aches or pains.  No bowel or bladder changes. No recent fevers, infections, or drenching night sweats otherwise.  No bleeding.  She denies any dyspnea or chest pain.  Her energy is good, she is staying physically active.    Her previous trigeminal neuralgia pain has not been an issue since she underwent microvascular decompressive surgery for trigeminal neuralgia on 7/26/23.  She has some chronic mild numbness in her toes without pain or imbalance that remains unchanged.  No  other neurologic changes or neuropathy.      She received an influenza and updated COVID-19 vaccine booster 10/2024.  She received an RSV vaccine in 2023.    Past Medical History:  Past Medical History:    Diarrhea, unspecified    Disorder of thyroid    Diverticulosis of large intestine    Fatigue    high dose of gabapentin    Hearing impaired person, bilateral    HEARING AIDS    Hearing loss    wear hearing aids    High cholesterol    I take meds for this    Hypercholesteremia    Hyperlipidemia    Osteopenia of lumbar spine    Presence of other cardiac implants and grafts    pessary    Smoldering myeloma    Stool incontinence    Syncope    Syncope and collapse    Thyroid disease    I take meds for this - Levothyroxin    Trigeminal neuralgia    Right side of face intermittent pain. Pain started 2015 pain not severe. Pain starts to right cheek travel to the top of the head.  It is a burning sharp sensation.  Pt had a dental procedure in 2015 to be fitted for new dentures and bitting down on cold mold she began to then have pain.  Triggers talking, eating, drinking, touch to face, brushing teeth, and cold wind hitting her face.     Visual impairment    GLASSES    Wears glasses    glasses     Past Surgical History:   Procedure Laterality Date    Colonoscopy  2013?    Ct cyberknife rad planning (no iv) em (cpt=77014) Right 10/30/2020    for trigeminal neuralgia- 75Gy in single fraction    Cyst removal  1981    Alo localization wire 1 site right (cpt=19281) Right 1981    benign.    Surgery - external      acl surgery    Tonsillectomy  long long time ago     Home Medications:   levothyroxine 75 MCG Oral Tab Take 1 tablet (75 mcg total) by mouth before breakfast. 90 tablet 3    atorvastatin 20 MG Oral Tab Take 1 tablet (20 mg total) by mouth nightly. 90 tablet 3    Calcium Carb-Cholecalciferol (CALCIUM 600+D3 OR) Take 2 tablets by mouth in the morning.      Cholecalciferol (VITAMIN D3) 25 MCG (1000 UT) Oral Cap Take 1  tablet by mouth in the morning.      Multiple Vitamins-Minerals (MULTIVITAMIN OR) Take by mouth in the morning.       Allergies:   Allergies   Allergen Reactions    Penicillins RASH     Occurred when she was a child, thinks its a rash     Psychosocial History:  Social History     Social History Narrative    .  Has 3 adult children.  5 grandchildren who live locally.  Retired high school guidance counselor     Social History     Socioeconomic History    Marital status:    Tobacco Use    Smoking status: Former     Current packs/day: 0.00     Average packs/day: 1.3 packs/day for 35.0 years (43.8 ttl pk-yrs)     Types: Cigarettes     Start date:      Quit date: 10/12/2010     Years since quittin.5    Smokeless tobacco: Never    Tobacco comments:     its OVER   Vaping Use    Vaping status: Never Used   Substance and Sexual Activity    Alcohol use: Yes     Comment: I do drink socially on occasion - not much    Drug use: Never   Other Topics Concern    Caffeine Concern Yes     Comment: 1 serving/day    Exercise Yes     Comment: walking   Social History Narrative    .  Has 3 adult children.  5 grandchildren who live locally.  Retired high school guidance counselor     Social Drivers of Health     Food Insecurity: No Food Insecurity (3/8/2023)    Received from [facility]    Hunger Vital Sign     Worried About Running Out of Food in the Last Year: Never true     Ran Out of Food in the Last Year: Never true   Transportation Needs: No Transportation Needs (3/8/2023)    Received from [facility]    PRAPARE - Transportation     Lack of Transportation (Medical): No     Lack of Transportation (Non-Medical): No    Received from [facility]    Housing Stability     Family Medical History:  Family History   Problem Relation Age of Onset    Cancer Mother 68        Lung, +smoker    No Known Problems Daughter     No Known Problems Son     No Known  Problems Maternal Grandmother     No Known Problems Maternal Grandfather     No Known Problems Paternal Grandmother     No Known Problems Paternal Grandfather     No Known Problems Son      Review of Systems:  A 10-point ROS was done with pertinent positives and negative per the HPI    Vital Signs:  Height: 169.4 cm (5' 6.69\") (04/23 0929)  Weight: 68.8 kg (151 lb 9.6 oz) (04/23 0929)  BSA (Calculated - sq m): 1.79 sq meters (04/23 0929)  Pulse: 77 (04/23 0929)  BP: 104/68 (04/23 0929)  Temp: 97.4 °F (36.3 °C) (04/23 0929)  Do Not Use - Resp Rate: --  SpO2: 98 % (04/23 0929)    Wt Readings from Last 6 Encounters:   04/23/25 68.8 kg (151 lb 9.6 oz)   04/08/25 68.5 kg (151 lb 0.2 oz)   11/22/24 65.8 kg (145 lb)   10/23/24 65.8 kg (145 lb)   06/24/24 66.5 kg (146 lb 9.6 oz)   04/24/24 66.2 kg (146 lb)     ECOG PS: 0    Physical Examination:  General: Patient is alert and oriented, not in acute distress  Psych: Mood and affect are appropriate  Eyes: EOMI  ENT: Oropharynx is clear  CV: Regular rate and rhythm, no murmurs, no LE edema  Respiratory: Lungs clear to auscultation bilaterally  GI/Abd: Soft, non-tender with normoactive bowel sounds, no hepatosplenomegaly  Neurological: Grossly intact   Lymphatics: No palpable lymphadenopathy  Skin: no rashes or petechiae    Laboratory:  Lab Results   Component Value Date    WBC 5.6 04/16/2025    WBC 5.5 10/15/2024    WBC 5.0 06/17/2024    HGB 13.6 04/16/2025    HGB 13.5 10/15/2024    HGB 13.5 06/17/2024    HCT 41.2 04/16/2025    MCV 97.9 04/16/2025    MCH 32.3 04/16/2025    MCHC 33.0 04/16/2025    RDW 12.5 04/16/2025    .0 04/16/2025    .0 10/15/2024    .0 06/17/2024     Lab Results   Component Value Date     (H) 04/16/2025    BUN 18 04/16/2025    BUNCREA 23.5 (H) 05/24/2021    CREATSERUM 0.97 04/16/2025    CREATSERUM 0.91 10/15/2024    CREATSERUM 0.94 06/17/2024    ANIONGAP 9 04/16/2025    GFR 65 07/28/2016    GFRNAA 60 06/09/2022    GFRAA 69  06/09/2022    CA 9.9 04/16/2025    OSMOCALC 295 04/16/2025    ALKPHO 82 04/16/2025    AST 23 04/16/2025    ALT 18 04/16/2025    BILT 0.7 04/16/2025    TP 8.4 (H) 04/16/2025    TP 8.0 04/16/2025    ALB 4.3 04/16/2025    ALB 4.13 04/16/2025    GLOBULIN 4.1 (H) 04/16/2025     04/16/2025    K 4.4 04/16/2025     04/16/2025    CO2 29.0 04/16/2025     Lab Results   Component Value Date    PTT 25.6 07/10/2023    INR 0.91 07/10/2023     Lab Results   Component Value Date    ELECTMS1 1.52 (H) 04/16/2025    ELECTMS1 1.62 (H) 10/15/2024    ELECTMS1 1.58 (H) 04/17/2024    ELECTMS1 1.62 (H) 10/18/2023    ELECTMS1 1.57 (H) 04/19/2023    ELECTMS1 1.55 (H) 10/20/2022    ELECTMS1 1.61 (H) 04/20/2022    ELECTMS1 1.48 (H) 10/11/2021    ELECTMS1 1.62 (H) 04/12/2021    ELECTMS1 1.57 (H) 12/30/2020    ELECTMS1 1.49 (H) 10/01/2020    ELECTMS1 1.55 (H) 07/11/2020    ELECTMS1 1.54 (H) 03/05/2020     Lab Results   Component Value Date    KAPPALTCHN 16.676 (H) 04/16/2025    KAPPALTCHN 20.383 (H) 10/15/2024    KAPPALTCHN 18.812 (H) 04/17/2024    KAPPALTCHN 15.451 (H) 10/18/2023    KAPPALTCHN 13.346 (H) 04/19/2023    KAPPALTCHN 15.546 (H) 10/20/2022    KAPPALTCHN 11.215 (H) 04/20/2022    KAPPALTCHN 15.769 (H) 10/11/2021    KAPPALTCHN 7.758 (H) 04/12/2021    KAPPALTCHN 8.847 (H) 12/30/2020    KAPPALTCHN 8.244 (H) 10/01/2020    KAPPALTCHN 5.153 (H) 07/11/2020    KAPPALTCHN 7.731 (H) 03/05/2020      Lab Results   Component Value Date    LAMBDALTCH 1.516 04/16/2025    LAMBDALTCH 1.517 10/15/2024    LAMBDALTCH 1.459 04/17/2024    LAMBDALTCH 1.531 10/18/2023    LAMBDALTCH 1.482 04/19/2023    LAMBDALTCH 1.781 10/20/2022    LAMBDALTCH 1.563 04/20/2022    LAMBDALTCH 1.845 10/11/2021    LAMBDALTCH 1.600 04/12/2021    LAMBDALTCH 1.947 12/30/2020    LAMBDALTCH 1.363 10/01/2020    LAMBDALTCH 1.335 07/11/2020    LAMBDALTCH 1.575 03/05/2020     Lab Results   Component Value Date    KAPLAMRATIO 11.00 (H) 04/16/2025    KAPLAMRATIO 13.44 (H)  10/15/2024    KAPLAMRATIO 12.89 (H) 04/17/2024    KAPLAMRATIO 10.09 (H) 10/18/2023    KAPLAMRATIO 9.01 (H) 04/19/2023    KAPLAMRATIO 8.73 (H) 10/20/2022    KAPLAMRATIO 7.18 (H) 04/20/2022    KAPLAMRATIO 8.55 (H) 10/11/2021    KAPLAMRATIO 4.85 (H) 04/12/2021    KAPLAMRATIO 4.54 (H) 12/30/2020    KAPLAMRATIO 6.05 (H) 10/01/2020    KAPLAMRATIO 3.86 (H) 07/11/2020    KAPLAMRATIO 1.2 04/21/2020    KAPLAMRATIO 4.91 (H) 03/05/2020     Lab Results   Component Value Date    IGG 2,332 (H) 04/16/2025    IGG 2,349 (H) 10/15/2024    IGG 2,300 (H) 04/17/2024    IGG 2,410 (H) 10/18/2023    IGG 2,190 (H) 04/19/2023    IGG 2,260 (H) 10/20/2022    IGG 2,270 (H) 04/20/2022    IGG 2,070 (H) 10/11/2021    IGG 2,270 (H) 04/12/2021    IGG 2,340 (H) 12/30/2020    IGG 2,130 (H) 10/01/2020    IGG 2,150 (H) 07/11/2020    IGG 2,300 (H) 03/05/2020     Lab Results   Component Value Date     04/20/2022     07/11/2020     04/07/2020     Lab Results   Component Value Date    B2MICR 0.203 04/20/2022    B2MICR 0.211 07/11/2020    B2MICR 0.205 04/07/2020     Lab Results   Component Value Date    VITD 69.3 04/16/2025    VITD 48.7 12/30/2019     Component      Latest Ref Rng & Units 4/10/2020   Total Protein, Urine      <=150 mg/d 71 mg/d   FREE URINARY KAPPA LIGHT CHAIN      0.00 - 32.90 mg/L 24.57   FREE URINARY KAPPA EXCRETION/D      mg/d 71.25   FREE UR LAMBDA LIGHT CHAIN      0.00 - 3.79 mg/L <0.74   FREE UR LAMBDA EXCRETION/DAY      mg/d  Unable to quantitate free light chain excretion per day.   MOLYL INTERPRETATION       Urine is POSITIVE for monoclonal Free Kappa Light chains (Bence Steele Protein). Urine MOLLY shows a monoclonal IgG heavy chain with associated kappa light chain and excess monoclonal free kappa light chains.    TOTAL VOLUME      mL 2900   HOURS COLLECTED      hr 24     Component      Latest Ref Rng & Units 4/19/2022   PROTEIN URINE CALC      <149.1 mg/24 hr 235.0 (H)   Urine Volume 24Hr      mL 2,350   URINE  BENCE STEELE PROTEIN 24HR       24-hour urine concentrated 50X demonstrates the presence of free Monoclonal Kappa Light Chains (Bence Steele Protein).  The free monoclonal light chain represents 0.01 grams. Whole molecule IgG Kappa is also detected.     Component      Latest Ref Rng 4/19/2023   PROTEIN URINE CALC      <149.1 mg/24 hr 167.2 (H)    Urine Volume 24Hr      mL 2,200    URINE BENCE STEELE PROTEIN 24HR 24-hour urine concentrated 50X demonstrates the presence of free Monoclonal Kappa Light Chains (Bence Steele Protein).  The free monoclonal light chain represents 0.01 grams. Whole molecule IgG Kappa is also detected.      4/17/24: 24-hour urine concentrated 50X demonstrates the presence of trace amounts of Free Kappa Monoclonal Light Chains (Bence Steele Protein). Unable to quantitate the 24-hour Bence Steele Protein due to the small amount of free light chains present.     4/16/25: 24-hour urine concentrated 50X demonstrates the presence of trace amounts of Free Kappa Monoclonal Light Chains (Bence Steele Protein). Unable to quantitate the 24-hour Bence Steele Protein due to the small amount of free light chains present.  Total protein/24hrs= 188.7mg    Imaging:    DEXA 1/30/19: Osteopenia with lumbar T score -1.9, total hip T score -1.7, femoral neck T score -0.8.    DEXA 10/29/24: Osteopenia with lumbar T score -1.8, total hip T score -1.9, femoral neck T score -1.6.    Impression & Plan:     *Smoldering myeloma  -Based on BMPC >10% without any clinical features of myeloma (negative PET/CT scan, no CRAB criteria)  -We discussed that no treatment is indicated at this time but requires ongoing surveillance.  Based on the [facility] 20/20/20 risk stratification model at time of bmbx she has 0 points= low risk (BMPC<20%, mspike <2 and sFLC ratio <20)- Studies indicate risk for progression decreases over time, thus if no progression within the first 5 yrs during which time the risk for progression is approx  10%/year, the risk for progression to multiple myeloma decreases to 3%/year over years 5-10, then down to 1%/year after 10 years from SMM diagnosis. Thus if she continues to have no evidence of disease progression by 2030, we would plan to switch to only annual monitoring  -Her most recent labs are stable from prior.  Whole-body MRI shows a likely benign spine lesion, no convincing evidence of myeloma.    -will continue to monitor:   -Repeat CBC, CMP, quantitative immunoglobulins, monoclonal protein study in 6 months    -repeat 24-hour urine collection for Bence-Steele proteins annually (due next 4/2026)   -whole body MRI or PET/CT annually (she prefers PET scans)- due next 4/2026  -Based on smoldering myeloma diagnosis, she has a degree of immunodeficiency and therefore I have recommended the importance of staying up-to-date with vaccinations.  I specifically recommended she get another updated COVID-19 vaccine booster at this time     *Trigeminal neuralgia  -Unrelated to underlying smoldering myeloma or monoclonal gammopathy.  -s/p right V2 Rhizotomy at [facility] 5/16/22 without significant benefit, then microvascular decompression surgery on 7/26/23 with complete resolution of pain.  Has been able to completely wean off gabapentin.      *Osteopenia of lumbar spine  -follow DEXA q2 years- due next 10/2026. If worsening bone mineral density would consider Prolia.  -Currently taking supplemental calcium and vitamin D3 1000 IU daily    RTC in 6 months    Jeffy Aguiar MD  Hematology/Medical Oncology  [facility]    Risk level: High-smoldering multiple myeloma requiring close monitoring.  Independent review of imaging and review of other providers notes     ongoing continuity of complex care related to malignancy.

## 2025-04-23 NOTE — PROGRESS NOTES
Outpatient Oncology Care Plan  Problem list:  knowledge deficit     Problems related to:    disease/disease progression     Interventions:  provided general teaching     Expected outcomes:  understands plan of care     Progress towards outcome:  making progress     Education Record     Learner:  Patient  Barriers / Limitations:  None  Method:  Brief focused  Outcome:  Shows understanding  Comments: 6 month f/up smoldering myeloma. Pt states she has been feeling well and her energy has been good. Looking to discuss labs.

## 2025-04-24 ENCOUNTER — TELEPHONE (OUTPATIENT)
Age: 70
End: 2025-04-24

## 2025-04-24 DIAGNOSIS — E03.9 HYPOTHYROIDISM, UNSPECIFIED TYPE: ICD-10-CM

## 2025-04-24 DIAGNOSIS — E78.00 HIGH CHOLESTEROL: Primary | ICD-10-CM

## 2025-04-24 DIAGNOSIS — Z00.00 ROUTINE GENERAL MEDICAL EXAMINATION AT A HEALTH CARE FACILITY: ICD-10-CM

## 2025-04-24 NOTE — TELEPHONE ENCOUNTER
Future Appointments   Date Time Provider Department Center   6/24/2025  9:30 AM Mathew Juarez MD [facility]     Orders to edward     Pt informed that labs need to be completed no sooner than 2 weeks prior to the appt. Pt aware to fast-no call back required

## 2025-04-25 NOTE — TELEPHONE ENCOUNTER
CMP and CBC completed 4/16/2025 with Dr Aguiar.    Pre-visit labs ordered per protocol    [employer] message sent to advise patient     [employer] message sent to advise patient   Last Accessed: 4/25/2025

## 2025-05-09 ENCOUNTER — PATIENT MESSAGE (OUTPATIENT)
Dept: INTERNAL MEDICINE CLINIC | Facility: CLINIC | Age: 70
End: 2025-05-09

## 2025-05-09 DIAGNOSIS — Z12.31 BREAST CANCER SCREENING BY MAMMOGRAM: Primary | ICD-10-CM

## 2025-05-27 ENCOUNTER — HOSPITAL ENCOUNTER (OUTPATIENT)
Dept: MAMMOGRAPHY | Age: 70
Discharge: HOME | End: 2025-05-27
Attending: FAMILY MEDICINE
Payer: MEDICARE

## 2025-05-27 DIAGNOSIS — Z12.31 BREAST CANCER SCREENING BY MAMMOGRAM: ICD-10-CM

## 2025-05-27 PROCEDURE — 77063 BREAST TOMOSYNTHESIS BI: CPT | Performed by: FAMILY MEDICINE

## 2025-05-27 PROCEDURE — 77067 SCR MAMMO BI INCL CAD: CPT | Performed by: FAMILY MEDICINE

## 2025-06-10 DIAGNOSIS — E78.00 HIGH CHOLESTEROL: ICD-10-CM

## 2025-06-11 RX ORDER — ATORVASTATIN CALCIUM 20 MG/1
20 TABLET, FILM COATED ORAL NIGHTLY
Qty: 90 TABLET | Refills: 3 | Status: SHIPPED | OUTPATIENT
Start: 2025-06-11

## 2025-06-11 NOTE — TELEPHONE ENCOUNTER
Refill passed per [facility] protocols.    Requested Prescriptions   Pending Prescriptions Disp Refills    ATORVASTATIN 20 MG Oral Tab [Pharmacy Med Name: ATORVASTATIN TAB 20MG] 90 tablet 3     Sig: TAKE 1 TABLET NIGHTLY       Cholesterol Medication Protocol Passed - 6/11/2025 11:20 AM

## 2025-06-17 ENCOUNTER — LAB ENCOUNTER (OUTPATIENT)
Dept: LAB | Age: 70
End: 2025-06-17
Attending: FAMILY MEDICINE
Payer: MEDICARE

## 2025-06-17 DIAGNOSIS — E03.9 HYPOTHYROIDISM, UNSPECIFIED TYPE: ICD-10-CM

## 2025-06-17 DIAGNOSIS — E78.00 HIGH CHOLESTEROL: ICD-10-CM

## 2025-06-17 DIAGNOSIS — Z00.00 ROUTINE GENERAL MEDICAL EXAMINATION AT A HEALTH CARE FACILITY: ICD-10-CM

## 2025-06-17 LAB
CHOLEST SERPL-MCNC: 179 MG/DL (ref ?–200)
FASTING PATIENT LIPID ANSWER: YES
HDLC SERPL-MCNC: 54 MG/DL (ref 40–59)
LDLC SERPL CALC-MCNC: 103 MG/DL (ref ?–100)
NONHDLC SERPL-MCNC: 125 MG/DL (ref ?–130)
T4 FREE SERPL-MCNC: 1.5 NG/DL (ref 0.8–1.7)
TRIGL SERPL-MCNC: 126 MG/DL (ref 30–149)
TSI SER-ACNC: 4.5 UIU/ML (ref 0.55–4.78)
VLDLC SERPL CALC-MCNC: 21 MG/DL (ref 0–30)

## 2025-06-17 PROCEDURE — 36415 COLL VENOUS BLD VENIPUNCTURE: CPT

## 2025-06-17 PROCEDURE — 80061 LIPID PANEL: CPT

## 2025-06-17 PROCEDURE — 84443 ASSAY THYROID STIM HORMONE: CPT

## 2025-06-17 PROCEDURE — 84439 ASSAY OF FREE THYROXINE: CPT

## 2025-06-24 ENCOUNTER — TELEPHONE (OUTPATIENT)
Age: 70
End: 2025-06-24

## 2025-06-24 ENCOUNTER — OFFICE VISIT (OUTPATIENT)
Age: 70
End: 2025-06-24
Payer: MEDICARE

## 2025-06-24 VITALS
RESPIRATION RATE: 16 BRPM | TEMPERATURE: 98 F | HEIGHT: 66.69 IN | OXYGEN SATURATION: 98 % | HEART RATE: 76 BPM | BODY MASS INDEX: 23.5 KG/M2 | DIASTOLIC BLOOD PRESSURE: 78 MMHG | SYSTOLIC BLOOD PRESSURE: 120 MMHG | WEIGHT: 148 LBS

## 2025-06-24 DIAGNOSIS — D47.2 SMOLDERING MYELOMA: ICD-10-CM

## 2025-06-24 DIAGNOSIS — E78.00 HIGH CHOLESTEROL: ICD-10-CM

## 2025-06-24 DIAGNOSIS — E03.9 HYPOTHYROIDISM, UNSPECIFIED TYPE: ICD-10-CM

## 2025-06-24 DIAGNOSIS — Z00.00 ENCOUNTER FOR ANNUAL HEALTH EXAMINATION: ICD-10-CM

## 2025-06-24 DIAGNOSIS — G50.0 TRIGEMINAL NEURALGIA: ICD-10-CM

## 2025-06-24 DIAGNOSIS — D84.81 IMMUNODEFICIENCY DUE TO CONDITIONS CLASSIFIED ELSEWHERE (HCC): ICD-10-CM

## 2025-06-24 DIAGNOSIS — Z00.00 ENCOUNTER FOR ANNUAL HEALTH EXAMINATION: Primary | ICD-10-CM

## 2025-06-24 DIAGNOSIS — Z00.00 ROUTINE GENERAL MEDICAL EXAMINATION AT A HEALTH CARE FACILITY: ICD-10-CM

## 2025-06-24 DIAGNOSIS — K63.5 POLYP OF COLON, UNSPECIFIED PART OF COLON, UNSPECIFIED TYPE: ICD-10-CM

## 2025-06-24 DIAGNOSIS — M85.88 OSTEOPENIA OF LUMBAR SPINE: ICD-10-CM

## 2025-06-24 DIAGNOSIS — K76.0 FATTY LIVER: ICD-10-CM

## 2025-06-24 PROCEDURE — G0439 PPPS, SUBSEQ VISIT: HCPCS | Performed by: FAMILY MEDICINE

## 2025-06-24 PROCEDURE — 99214 OFFICE O/P EST MOD 30 MIN: CPT | Performed by: FAMILY MEDICINE

## 2025-06-24 NOTE — PROGRESS NOTES
Subjective:   Nilesh Garcia is a 69 year old female who presents for a Medicare Wellness Visit charge within the last 11 months and Patient may not meet criteria for AWV: Please evaluate for correct coding and scheduled follow up of multiple significant but stable problems.             Overall doing well. No new concerns today. Has been consistent with her current statin and levothyroxine.   History/Other:   Fall Risk Assessment:   She has been screened for Falls and is low risk.      Cognitive Assessment:   She had a completely normal cognitive assessment - see flowsheet entries     Functional Ability/Status:   Nilesh Garcia has some abnormal functions as listed below:  She has Hearing problems based on screening of functional status.      Depression Screening (PHQ):  PHQ-2 SCORE: 0  , done 6/24/2025        <5 minutes spent screening and counseling for depression    Advanced Directives:   She does have a Living Will but we do NOT have it on file in Epic.    She does have a POA but we do NOT have it on file in Epic.    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    Current Medications:  Outpatient Medications Marked as Taking for the 6/24/25 encounter (Office Visit) with Mathew Juarez MD   Medication Sig    atorvastatin 20 MG Oral Tab Take 1 tablet (20 mg total) by mouth nightly.    levothyroxine 75 MCG Oral Tab Take 1 tablet (75 mcg total) by mouth before breakfast.    Calcium  Carb-Cholecalciferol (CALCIUM 600+D3 OR) Take 2 tablets by mouth in the morning.    Cholecalciferol (VITAMIN D3) 25 MCG (1000 UT) Oral Cap Take 1 tablet by mouth in the morning.    Multiple Vitamins-Minerals (MULTIVITAMIN OR) Take by mouth in the morning.       Medical History:  She  has a past medical history of Diarrhea, unspecified, Disorder of thyroid, Diverticulosis of large intestine, Fatigue, Hearing impaired person, bilateral, Hearing loss, High cholesterol, Hypercholesteremia, Hyperlipidemia, Osteopenia of lumbar spine (10/23/2024), Presence of other cardiac implants and grafts, Smoldering myeloma (2020), Stool incontinence, Syncope, Syncope and collapse (2023), Thyroid disease, Trigeminal neuralgia (10/2019), Visual impairment, and Wears glasses.  Surgical History:  She  has a past surgical history that includes cyst removal (); surgery - external; ct cyberknife rad planning (no iv) em (cpt=77014) (Right, 10/30/2020); eladia localization wire 1 site right (cpt=19281) (Right, ); colonoscopy (?); and tonsillectomy (long long time ago).   Family History:  Her family history includes Cancer (age of onset: 68) in her mother; No Known Problems in her daughter, maternal grandfather, maternal grandmother, paternal grandfather, paternal grandmother, son, and son.  Social History:  She  reports that she quit smoking about 14 years ago. Her smoking use included cigarettes. She started smoking about 50 years ago. She has a 43.8 pack-year smoking history. She has never used smokeless tobacco. She reports current alcohol use. She reports that she does not use drugs.    Tobacco:  She smoked tobacco in the past but quit greater than 12 months ago.  Social History     Tobacco Use   Smoking Status Former    Current packs/day: 0.00    Average packs/day: 1.3 packs/day for 35.0 years (43.8 ttl pk-yrs)    Types: Cigarettes    Start date:     Quit date: 10/12/2010    Years since quittin.7    Smokeless Tobacco Never   Tobacco Comments    its OVER        CAGE Alcohol Screen:   CAGE screening score of 0 on 6/24/2025, showing low risk of alcohol abuse.      Patient Care Team:  Mathew Juarez MD as PCP - General (Family Medicine)  Kimberly Metcalf DO as Consulting Physician (NEUROLOGY)  Herbert Contreras MD (Radiation Oncology)  Romero, Griselle, Alisson Donovan, MEGAN    Review of Systems     Negative except as in HPI    Objective:   Physical Exam    /78 (BP Location: Right arm)   Pulse 76   Temp 97.9 °F (36.6 °C) (Oral)   Resp 16   Ht 5' 6.69\" (1.694 m)   Wt 148 lb (67.1 kg)   SpO2 98%   BMI 23.40 kg/m²  Estimated body mass index is 23.4 kg/m² as calculated from the following:    Height as of this encounter: 5' 6.69\" (1.694 m).    Weight as of this encounter: 148 lb (67.1 kg).  GEN: well appearing, no distress  HEENT: JENNIFER, ears clear  CV: RRR, no murmur  CHEST: CTAB  ADB: soft, NT  EXT: no swelling    Medicare Hearing Assessment:   Hearing Screening    Time taken: 6/24/2025  9:23 AM  Screening Method: Whisper Test  Whisper Test Result: Pass (Comment: bilateral ears earing aids)         Visual Acuity:   Right Eye Visual Acuity: Corrected Right Eye Chart Acuity: 20/50   Left Eye Visual Acuity: Corrected Left Eye Chart Acuity: 20/50   Both Eyes Visual Acuity: Corrected Both Eyes Chart Acuity: 20/60   Able To Tolerate Visual Acuity: Yes        Assessment & Plan:   Nilesh Garcia is a 69 year old female who presents for a Medicare Assessment.     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 Aguiar.    Hypothyroidism, unspecified type  Clinically euthyroid. Continue current levothyroxine dose. Monitoring TSH yearly    High cholesterol  Continue statin. Monitor lipid panel yearly    Osteopenia of lumbar spine  Repeat DEXA next year. Continue vit D-ca supplementation and weight  bearing exercise.     Trigeminal neuralgia  No recurrent symptoms. S/P MVD    Polyp of colon, unspecified part of colon, unspecified type  Recall colonoscopy Dec 2027      The patient indicates understanding of these issues and agrees to the plan.  Reinforced healthy diet, lifestyle, and exercise.      Return in 1 year (on 6/24/2026).     Mathew Juarez MD, 6/24/2025     Supplementary Documentation:   General Health:  In the past six months, have you lost more than 10 pounds without trying?: 2 - No  Has your appetite been poor?: No  Type of Diet: Balanced  How does the patient maintain a good energy level?: Daily Walks  How would you describe your daily physical activity?: Moderate  How would you describe your current health state?: Good  How do you maintain positive mental well-being?: Social Interaction, Games, Visiting Friends, Visiting Family  On a scale of 0 to 10, with 0 being no pain and 10 being severe pain, what is your pain level?: (Patient-Rptd) 0 - (None)  In the past six months, have you experienced urine leakage?: 0-No  At any time do you feel concerned for the safety/well-being of yourself and/or your children, in your home or elsewhere?: No  Have you had any immunizations at another office such as Influenza, Hepatitis B, Tetanus, or Pneumococcal?: Yes    Health Maintenance   Topic Date Due    Zoster Vaccines (1 of 2) Never done    Lung Cancer Screening  Never done    Annual Depression Screening  01/01/2025    Annual Physical  06/24/2025    Mammogram  05/27/2026    Colorectal Cancer Screening  12/20/2027    Influenza Vaccine  Completed    DEXA Scan  Completed    Fall Risk Screening (Annual)  Completed    Pneumococcal Vaccine: 50+ Years  Completed    COVID-19 Vaccine  Completed    Meningococcal B Vaccine  Aged Out

## 2025-06-24 NOTE — TELEPHONE ENCOUNTER
Future Appointments   Date Time Provider Department Center   6/23/2026  9:00 AM Mathew Juarez MD [facility]     Orders to edward     Pt informed that labs need to be completed no sooner than 2 weeks prior to the appt. Pt aware to fast-no call back required

## 2025-07-20 DIAGNOSIS — E03.9 HYPOTHYROIDISM, UNSPECIFIED TYPE: ICD-10-CM

## 2025-07-23 RX ORDER — LEVOTHYROXINE SODIUM 75 UG/1
75 TABLET ORAL
Qty: 90 TABLET | Refills: 3 | Status: SHIPPED | OUTPATIENT
Start: 2025-07-23

## (undated) DIAGNOSIS — G50.0 TRIGEMINAL NEURALGIA: ICD-10-CM

## (undated) DIAGNOSIS — G50.0 TRIGEMINAL NEURALGIA: Primary | ICD-10-CM

## (undated) DIAGNOSIS — Z12.31 ENCOUNTER FOR SCREENING MAMMOGRAM FOR MALIGNANT NEOPLASM OF BREAST: Primary | ICD-10-CM

## (undated) DIAGNOSIS — E03.9 HYPOTHYROIDISM, UNSPECIFIED TYPE: Primary | ICD-10-CM

## (undated) DIAGNOSIS — E03.9 HYPOTHYROIDISM, UNSPECIFIED TYPE: ICD-10-CM

## (undated) DEVICE — 3M™ STERI-STRIP™ REINFORCED ADHESIVE SKIN CLOSURES, R1547, 1/2 IN X 4 IN (12 MM X 100 MM), 6 STRIPS/ENVELOPE: Brand: 3M™ STERI-STRIP™

## (undated) DEVICE — PACK DRAPE HEAD/NECK STER

## (undated) DEVICE — 3M™ TEGADERM™ +PAD FILM DRESSING WITH NON-ADHERENT PAD, 3587, 3-1/2 IN X 4-1/8 IN (9 CM X 10.5 CM), 25/CAR, 4 CAR/CS: Brand: 3M™ TEGADERM™

## (undated) DEVICE — STANDARD HYPODERMIC NEEDLE,POLYPROPYLENE HUB: Brand: MONOJECT

## (undated) DEVICE — SOLUTION IRRIG 1000ML 0.9% NACL USP BTL

## (undated) DEVICE — GLOVE SUR 7.5 SENSICARE PI PIP CRM PWD F

## (undated) DEVICE — PREMIUM WET SKIN PREP TRAY: Brand: MEDLINE INDUSTRIES, INC.

## (undated) DEVICE — SUT MCRYL 4-0 18IN PS-2 ABSRB UD 19MM 3/8 CIR

## (undated) NOTE — LETTER
20    Patient: Ralph Rainey  : 1955 Visit date: 3/12/2020    Dear  Dr. Sheree Banks MD,    Thank you for referring Ralph Rainey to my practice. Please find my assessment and plan below.              Assessment   Elevated liver function

## (undated) NOTE — ED AVS SNAPSHOT
Jessy Jones   MRN: [id number]    Department:  [facility] Emergency Department   Date of Visit:  10/23/2019           Disclosure     Insurance plans vary and the physician(s) referred by the ER may not be covered by your plan.  Please contact you tell this physician (or your personal doctor if your instructions are to return to your personal doctor) about any new or lasting problems. The primary care or specialist physician will see patients referred from the [facility] Emergency Department.  Deepak Morales

## (undated) NOTE — MR AVS SNAPSHOT
After Visit Summary   4/21/2020    Cristian Omer    MRN: [id number]           Visit Information     Date & Time  4/21/2020  8:30 AM Provider  Andrea Nguyen MD Department  [facility] in [place] [address].  Phone  [phone] SURGICAL PATHOLOGY TISSUE [[id number] CUSTOM]     Future Labs/Procedures Expected by Expires    SURGICAL PATHOLOGY TISSUE [[id number] CUSTOM]  4/21/2020 (Approximate) 4/21/2021      Future Appointments        Provider Department    6/11/2020 9:00 AM ANUP Negrete Although you can often return to normal activities after a bone marrow exam, avoid heavy activity or exercise for the next 24 hours. This will help minimize bleeding and discomfort.                              DM now offers Video Visits through Laisha Magallon Average cost  $70*       [facility] – [address]   Monday – Friday  10:00 am – 10:00 pm   Saturday – Sunday  10:00 am – 4:00 pm     [facility] Mcallister   Monday – Friday  4:00 pm – 10:00 pm   Audi